# Patient Record
Sex: MALE | Race: WHITE | Employment: UNEMPLOYED | URBAN - METROPOLITAN AREA
[De-identification: names, ages, dates, MRNs, and addresses within clinical notes are randomized per-mention and may not be internally consistent; named-entity substitution may affect disease eponyms.]

---

## 2017-01-13 ENCOUNTER — HOSPITAL ENCOUNTER (OUTPATIENT)
Age: 57
Setting detail: OBSERVATION
Discharge: HOME | DRG: 690 | End: 2017-01-16
Attending: EMERGENCY MEDICINE | Admitting: INTERNAL MEDICINE
Payer: MEDICARE

## 2017-01-13 DIAGNOSIS — N39.0 URINARY TRACT INFECTION, SITE UNSPECIFIED: ICD-10-CM

## 2017-01-13 DIAGNOSIS — R31.9 HEMATURIA, UNDIAGNOSED CAUSE: ICD-10-CM

## 2017-01-13 DIAGNOSIS — A41.51 SEPSIS DUE TO ESCHERICHIA COLI (HCC): Primary | ICD-10-CM

## 2017-01-13 PROBLEM — I10 HTN (HYPERTENSION): Status: ACTIVE | Noted: 2017-01-13

## 2017-01-13 PROBLEM — N13.9 URINARY OBSTRUCTION: Status: ACTIVE | Noted: 2017-01-13

## 2017-01-13 LAB
ANION GAP SERPL CALCULATED.3IONS-SCNC: 16 MEQ/L (ref 7–13)
BACTERIA: ABNORMAL /HPF
BILIRUBIN URINE: ABNORMAL
BLOOD, URINE: ABNORMAL
BUN BLDV-MCNC: 17 MG/DL (ref 6–20)
CALCIUM SERPL-MCNC: 9.6 MG/DL (ref 8.6–10.2)
CHLORIDE BLD-SCNC: 99 MEQ/L (ref 98–107)
CLARITY: ABNORMAL
CO2: 23 MEQ/L (ref 22–29)
COLOR: ABNORMAL
CREAT SERPL-MCNC: 0.9 MG/DL (ref 0.7–1.2)
CRYSTALS, UA: ABNORMAL
EPITHELIAL CELLS, UA: ABNORMAL /HPF
GFR AFRICAN AMERICAN: >60
GFR NON-AFRICAN AMERICAN: >60
GLUCOSE BLD-MCNC: 148 MG/DL (ref 74–109)
GLUCOSE URINE: NEGATIVE MG/DL
HCT VFR BLD CALC: 53 % (ref 42–52)
HEMOGLOBIN: 16.9 G/DL (ref 14–18)
KETONES, URINE: 40 MG/DL
LACTIC ACID: 2.7 MMOL/L (ref 0.5–2.2)
LEUKOCYTE ESTERASE, URINE: ABNORMAL
MCH RBC QN AUTO: 30.2 PG (ref 27–31.3)
MCHC RBC AUTO-ENTMCNC: 31.9 % (ref 33–37)
MCV RBC AUTO: 94.7 FL (ref 80–100)
NITRITE, URINE: POSITIVE
PDW BLD-RTO: 14.3 % (ref 11.5–14.5)
PH UA: 7 (ref 5–9)
PLATELET # BLD: 276 K/UL (ref 130–400)
POTASSIUM SERPL-SCNC: 4 MEQ/L (ref 3.5–5.1)
PROTEIN UA: >=300 MG/DL
RBC # BLD: 5.6 M/UL (ref 4.7–6.1)
RBC UA: >100 /HPF (ref 0–2)
SODIUM BLD-SCNC: 138 MEQ/L (ref 132–144)
SPECIFIC GRAVITY UA: 1.03 (ref 1–1.03)
URINE REFLEX TO CULTURE: YES
UROBILINOGEN, URINE: 1 E.U./DL
WBC # BLD: 15 K/UL (ref 4.8–10.8)
WBC UA: ABNORMAL /HPF (ref 0–5)

## 2017-01-13 PROCEDURE — 1210000000 HC MED SURG R&B

## 2017-01-13 PROCEDURE — 96365 THER/PROPH/DIAG IV INF INIT: CPT

## 2017-01-13 PROCEDURE — 81001 URINALYSIS AUTO W/SCOPE: CPT

## 2017-01-13 PROCEDURE — 6370000000 HC RX 637 (ALT 250 FOR IP): Performed by: INTERNAL MEDICINE

## 2017-01-13 PROCEDURE — 99284 EMERGENCY DEPT VISIT MOD MDM: CPT

## 2017-01-13 PROCEDURE — 80048 BASIC METABOLIC PNL TOTAL CA: CPT

## 2017-01-13 PROCEDURE — 2580000003 HC RX 258: Performed by: INTERNAL MEDICINE

## 2017-01-13 PROCEDURE — G0378 HOSPITAL OBSERVATION PER HR: HCPCS

## 2017-01-13 PROCEDURE — 87086 URINE CULTURE/COLONY COUNT: CPT

## 2017-01-13 PROCEDURE — 83605 ASSAY OF LACTIC ACID: CPT

## 2017-01-13 PROCEDURE — 87077 CULTURE AEROBIC IDENTIFY: CPT

## 2017-01-13 PROCEDURE — 87186 SC STD MICRODIL/AGAR DIL: CPT

## 2017-01-13 PROCEDURE — 6370000000 HC RX 637 (ALT 250 FOR IP): Performed by: EMERGENCY MEDICINE

## 2017-01-13 PROCEDURE — 6360000002 HC RX W HCPCS: Performed by: EMERGENCY MEDICINE

## 2017-01-13 PROCEDURE — 85027 COMPLETE CBC AUTOMATED: CPT

## 2017-01-13 PROCEDURE — 36415 COLL VENOUS BLD VENIPUNCTURE: CPT

## 2017-01-13 PROCEDURE — 2580000003 HC RX 258: Performed by: EMERGENCY MEDICINE

## 2017-01-13 RX ORDER — SODIUM CHLORIDE 9 MG/ML
INJECTION, SOLUTION INTRAVENOUS CONTINUOUS
Status: DISPENSED | OUTPATIENT
Start: 2017-01-13 | End: 2017-01-16

## 2017-01-13 RX ORDER — SODIUM CHLORIDE 0.9 % (FLUSH) 0.9 %
10 SYRINGE (ML) INJECTION PRN
Status: DISCONTINUED | OUTPATIENT
Start: 2017-01-13 | End: 2017-01-16 | Stop reason: HOSPADM

## 2017-01-13 RX ORDER — NICOTINE 21 MG/24HR
1 PATCH, TRANSDERMAL 24 HOURS TRANSDERMAL DAILY
Status: DISCONTINUED | OUTPATIENT
Start: 2017-01-13 | End: 2017-01-16 | Stop reason: HOSPADM

## 2017-01-13 RX ORDER — SODIUM CHLORIDE 0.9 % (FLUSH) 0.9 %
10 SYRINGE (ML) INJECTION EVERY 12 HOURS SCHEDULED
Status: DISCONTINUED | OUTPATIENT
Start: 2017-01-13 | End: 2017-01-16 | Stop reason: HOSPADM

## 2017-01-13 RX ORDER — NICOTINE 21 MG/24HR
1 PATCH, TRANSDERMAL 24 HOURS TRANSDERMAL DAILY
Status: DISCONTINUED | OUTPATIENT
Start: 2017-01-13 | End: 2017-01-13

## 2017-01-13 RX ORDER — PHENAZOPYRIDINE HYDROCHLORIDE 200 MG/1
200 TABLET, FILM COATED ORAL ONCE
Status: COMPLETED | OUTPATIENT
Start: 2017-01-13 | End: 2017-01-13

## 2017-01-13 RX ORDER — BISACODYL 10 MG
10 SUPPOSITORY, RECTAL RECTAL DAILY PRN
Status: DISCONTINUED | OUTPATIENT
Start: 2017-01-13 | End: 2017-01-16 | Stop reason: HOSPADM

## 2017-01-13 RX ORDER — DOCUSATE SODIUM 100 MG/1
100 CAPSULE, LIQUID FILLED ORAL 2 TIMES DAILY
Status: DISCONTINUED | OUTPATIENT
Start: 2017-01-13 | End: 2017-01-16 | Stop reason: HOSPADM

## 2017-01-13 RX ORDER — ACETAMINOPHEN 325 MG/1
650 TABLET ORAL EVERY 4 HOURS PRN
Status: DISCONTINUED | OUTPATIENT
Start: 2017-01-13 | End: 2017-01-16 | Stop reason: HOSPADM

## 2017-01-13 RX ORDER — TAMSULOSIN HYDROCHLORIDE 0.4 MG/1
0.4 CAPSULE ORAL ONCE
Status: COMPLETED | OUTPATIENT
Start: 2017-01-13 | End: 2017-01-13

## 2017-01-13 RX ORDER — TAMSULOSIN HYDROCHLORIDE 0.4 MG/1
0.4 CAPSULE ORAL DAILY
Status: DISCONTINUED | OUTPATIENT
Start: 2017-01-13 | End: 2017-01-16 | Stop reason: HOSPADM

## 2017-01-13 RX ORDER — 0.9 % SODIUM CHLORIDE 0.9 %
1000 INTRAVENOUS SOLUTION INTRAVENOUS ONCE
Status: COMPLETED | OUTPATIENT
Start: 2017-01-13 | End: 2017-01-13

## 2017-01-13 RX ORDER — ONDANSETRON 2 MG/ML
4 INJECTION INTRAMUSCULAR; INTRAVENOUS EVERY 6 HOURS PRN
Status: DISCONTINUED | OUTPATIENT
Start: 2017-01-13 | End: 2017-01-16 | Stop reason: HOSPADM

## 2017-01-13 RX ADMIN — METOPROLOL TARTRATE 25 MG: 25 TABLET, FILM COATED ORAL at 23:08

## 2017-01-13 RX ADMIN — PHENAZOPYRIDINE 200 MG: 200 TABLET ORAL at 16:59

## 2017-01-13 RX ADMIN — DOCUSATE SODIUM 100 MG: 100 CAPSULE, LIQUID FILLED ORAL at 23:07

## 2017-01-13 RX ADMIN — TAMSULOSIN HYDROCHLORIDE 0.4 MG: 0.4 CAPSULE ORAL at 16:59

## 2017-01-13 RX ADMIN — SODIUM CHLORIDE, PRESERVATIVE FREE 10 ML: 5 INJECTION INTRAVENOUS at 23:22

## 2017-01-13 RX ADMIN — SODIUM CHLORIDE: 9 INJECTION, SOLUTION INTRAVENOUS at 23:22

## 2017-01-13 RX ADMIN — CEFTRIAXONE SODIUM 1 G: 1 INJECTION, POWDER, FOR SOLUTION INTRAMUSCULAR; INTRAVENOUS at 17:56

## 2017-01-13 RX ADMIN — SODIUM CHLORIDE 1000 ML: 9 INJECTION, SOLUTION INTRAVENOUS at 17:00

## 2017-01-13 ASSESSMENT — ENCOUNTER SYMPTOMS
CONSTIPATION: 1
COUGH: 0
DIARRHEA: 0
BACK PAIN: 1
VOMITING: 0
SHORTNESS OF BREATH: 0

## 2017-01-14 ENCOUNTER — APPOINTMENT (OUTPATIENT)
Dept: CT IMAGING | Age: 57
DRG: 690 | End: 2017-01-14
Payer: MEDICARE

## 2017-01-14 LAB
ANION GAP SERPL CALCULATED.3IONS-SCNC: 10 MEQ/L (ref 7–13)
BUN BLDV-MCNC: 15 MG/DL (ref 6–20)
CALCIUM SERPL-MCNC: 8.5 MG/DL (ref 8.6–10.2)
CHLORIDE BLD-SCNC: 104 MEQ/L (ref 98–107)
CO2: 22 MEQ/L (ref 22–29)
CREAT SERPL-MCNC: 0.68 MG/DL (ref 0.7–1.2)
GFR AFRICAN AMERICAN: >60
GFR NON-AFRICAN AMERICAN: >60
GLUCOSE BLD-MCNC: 97 MG/DL (ref 74–109)
HCT VFR BLD CALC: 43.3 % (ref 42–52)
HEMOGLOBIN: 13.9 G/DL (ref 14–18)
MCH RBC QN AUTO: 30.4 PG (ref 27–31.3)
MCHC RBC AUTO-ENTMCNC: 32.1 % (ref 33–37)
MCV RBC AUTO: 94.6 FL (ref 80–100)
PDW BLD-RTO: 14.1 % (ref 11.5–14.5)
PLATELET # BLD: 200 K/UL (ref 130–400)
POTASSIUM SERPL-SCNC: 4 MEQ/L (ref 3.5–5.1)
RBC # BLD: 4.58 M/UL (ref 4.7–6.1)
SODIUM BLD-SCNC: 136 MEQ/L (ref 132–144)
WBC # BLD: 9.9 K/UL (ref 4.8–10.8)

## 2017-01-14 PROCEDURE — 6370000000 HC RX 637 (ALT 250 FOR IP): Performed by: UROLOGY

## 2017-01-14 PROCEDURE — G8978 MOBILITY CURRENT STATUS: HCPCS

## 2017-01-14 PROCEDURE — 96375 TX/PRO/DX INJ NEW DRUG ADDON: CPT

## 2017-01-14 PROCEDURE — 99221 1ST HOSP IP/OBS SF/LOW 40: CPT | Performed by: UROLOGY

## 2017-01-14 PROCEDURE — 6360000004 HC RX CONTRAST MEDICATION: Performed by: RADIOLOGY

## 2017-01-14 PROCEDURE — G8979 MOBILITY GOAL STATUS: HCPCS

## 2017-01-14 PROCEDURE — G0378 HOSPITAL OBSERVATION PER HR: HCPCS

## 2017-01-14 PROCEDURE — 85027 COMPLETE CBC AUTOMATED: CPT

## 2017-01-14 PROCEDURE — 80048 BASIC METABOLIC PNL TOTAL CA: CPT

## 2017-01-14 PROCEDURE — 96376 TX/PRO/DX INJ SAME DRUG ADON: CPT

## 2017-01-14 PROCEDURE — 6370000000 HC RX 637 (ALT 250 FOR IP): Performed by: INTERNAL MEDICINE

## 2017-01-14 PROCEDURE — 36415 COLL VENOUS BLD VENIPUNCTURE: CPT

## 2017-01-14 PROCEDURE — 97161 PT EVAL LOW COMPLEX 20 MIN: CPT

## 2017-01-14 PROCEDURE — 1210000000 HC MED SURG R&B

## 2017-01-14 PROCEDURE — 2580000003 HC RX 258

## 2017-01-14 PROCEDURE — 2580000003 HC RX 258: Performed by: INTERNAL MEDICINE

## 2017-01-14 PROCEDURE — 74178 CT ABD&PLV WO CNTR FLWD CNTR: CPT

## 2017-01-14 PROCEDURE — 6360000002 HC RX W HCPCS: Performed by: INTERNAL MEDICINE

## 2017-01-14 PROCEDURE — 96366 THER/PROPH/DIAG IV INF ADDON: CPT

## 2017-01-14 RX ORDER — MORPHINE SULFATE 2 MG/ML
2 INJECTION, SOLUTION INTRAMUSCULAR; INTRAVENOUS ONCE
Status: COMPLETED | OUTPATIENT
Start: 2017-01-14 | End: 2017-01-14

## 2017-01-14 RX ORDER — PHENAZOPYRIDINE HYDROCHLORIDE 200 MG/1
200 TABLET, FILM COATED ORAL
Status: DISCONTINUED | OUTPATIENT
Start: 2017-01-14 | End: 2017-01-16 | Stop reason: HOSPADM

## 2017-01-14 RX ORDER — MAGNESIUM HYDROXIDE 1200 MG/15ML
60 LIQUID ORAL EVERY 10 MIN PRN
Status: DISCONTINUED | OUTPATIENT
Start: 2017-01-14 | End: 2017-01-15 | Stop reason: CLARIF

## 2017-01-14 RX ORDER — MAGNESIUM HYDROXIDE 1200 MG/15ML
LIQUID ORAL
Status: COMPLETED
Start: 2017-01-14 | End: 2017-01-14

## 2017-01-14 RX ORDER — SODIUM CHLORIDE 0.9 % (FLUSH) 0.9 %
10 SYRINGE (ML) INJECTION PRN
Status: DISCONTINUED | OUTPATIENT
Start: 2017-01-14 | End: 2017-01-16 | Stop reason: HOSPADM

## 2017-01-14 RX ORDER — MORPHINE SULFATE 2 MG/ML
2 INJECTION, SOLUTION INTRAMUSCULAR; INTRAVENOUS EVERY 4 HOURS PRN
Status: DISCONTINUED | OUTPATIENT
Start: 2017-01-14 | End: 2017-01-16 | Stop reason: HOSPADM

## 2017-01-14 RX ORDER — HYDROCODONE BITARTRATE AND ACETAMINOPHEN 5; 325 MG/1; MG/1
2 TABLET ORAL EVERY 6 HOURS PRN
Status: DISCONTINUED | OUTPATIENT
Start: 2017-01-14 | End: 2017-01-16 | Stop reason: HOSPADM

## 2017-01-14 RX ORDER — HYDROCODONE BITARTRATE AND ACETAMINOPHEN 5; 325 MG/1; MG/1
1 TABLET ORAL EVERY 6 HOURS PRN
Status: DISCONTINUED | OUTPATIENT
Start: 2017-01-14 | End: 2017-01-14 | Stop reason: SDUPTHER

## 2017-01-14 RX ADMIN — PHENAZOPYRIDINE 200 MG: 200 TABLET ORAL at 17:26

## 2017-01-14 RX ADMIN — METOPROLOL TARTRATE 25 MG: 25 TABLET, FILM COATED ORAL at 20:17

## 2017-01-14 RX ADMIN — SODIUM CHLORIDE 50 ML: 900 IRRIGANT IRRIGATION at 18:38

## 2017-01-14 RX ADMIN — DOCUSATE SODIUM 100 MG: 100 CAPSULE, LIQUID FILLED ORAL at 20:17

## 2017-01-14 RX ADMIN — SODIUM CHLORIDE, PRESERVATIVE FREE 10 ML: 5 INJECTION INTRAVENOUS at 13:35

## 2017-01-14 RX ADMIN — SODIUM CHLORIDE: 9 INJECTION, SOLUTION INTRAVENOUS at 09:01

## 2017-01-14 RX ADMIN — CEFTRIAXONE SODIUM 1 G: 1 INJECTION, POWDER, FOR SOLUTION INTRAMUSCULAR; INTRAVENOUS at 17:26

## 2017-01-14 RX ADMIN — TAMSULOSIN HYDROCHLORIDE 0.4 MG: 0.4 CAPSULE ORAL at 17:26

## 2017-01-14 RX ADMIN — MORPHINE SULFATE 2 MG: 2 INJECTION, SOLUTION INTRAMUSCULAR; INTRAVENOUS at 15:27

## 2017-01-14 RX ADMIN — PHENAZOPYRIDINE 200 MG: 200 TABLET ORAL at 12:37

## 2017-01-14 RX ADMIN — HYDROCODONE BITARTRATE AND ACETAMINOPHEN 1 TABLET: 5; 325 TABLET ORAL at 12:37

## 2017-01-14 RX ADMIN — MAGNESIUM HYDROXIDE 50 ML: 1200 LIQUID ORAL at 18:38

## 2017-01-14 RX ADMIN — DOCUSATE SODIUM 100 MG: 100 CAPSULE, LIQUID FILLED ORAL at 08:54

## 2017-01-14 RX ADMIN — MORPHINE SULFATE 2 MG: 2 INJECTION, SOLUTION INTRAMUSCULAR; INTRAVENOUS at 20:18

## 2017-01-14 RX ADMIN — IOPAMIDOL 100 ML: 612 INJECTION, SOLUTION INTRAVENOUS at 13:34

## 2017-01-14 RX ADMIN — METOPROLOL TARTRATE 25 MG: 25 TABLET, FILM COATED ORAL at 08:53

## 2017-01-14 ASSESSMENT — PAIN SCALES - GENERAL
PAINLEVEL_OUTOF10: 7
PAINLEVEL_OUTOF10: 5
PAINLEVEL_OUTOF10: 0
PAINLEVEL_OUTOF10: 8
PAINLEVEL_OUTOF10: 7
PAINLEVEL_OUTOF10: 6

## 2017-01-15 PROCEDURE — 6370000000 HC RX 637 (ALT 250 FOR IP): Performed by: INTERNAL MEDICINE

## 2017-01-15 PROCEDURE — 96376 TX/PRO/DX INJ SAME DRUG ADON: CPT

## 2017-01-15 PROCEDURE — 1210000000 HC MED SURG R&B

## 2017-01-15 PROCEDURE — 6370000000 HC RX 637 (ALT 250 FOR IP): Performed by: UROLOGY

## 2017-01-15 PROCEDURE — 99232 SBSQ HOSP IP/OBS MODERATE 35: CPT | Performed by: UROLOGY

## 2017-01-15 PROCEDURE — G8988 SELF CARE GOAL STATUS: HCPCS

## 2017-01-15 PROCEDURE — 6360000002 HC RX W HCPCS: Performed by: INTERNAL MEDICINE

## 2017-01-15 PROCEDURE — G8989 SELF CARE D/C STATUS: HCPCS

## 2017-01-15 PROCEDURE — 2580000003 HC RX 258

## 2017-01-15 PROCEDURE — 96366 THER/PROPH/DIAG IV INF ADDON: CPT

## 2017-01-15 PROCEDURE — 97165 OT EVAL LOW COMPLEX 30 MIN: CPT

## 2017-01-15 PROCEDURE — G0378 HOSPITAL OBSERVATION PER HR: HCPCS

## 2017-01-15 PROCEDURE — G8987 SELF CARE CURRENT STATUS: HCPCS

## 2017-01-15 PROCEDURE — 96375 TX/PRO/DX INJ NEW DRUG ADDON: CPT

## 2017-01-15 PROCEDURE — 2580000003 HC RX 258: Performed by: INTERNAL MEDICINE

## 2017-01-15 RX ORDER — MAGNESIUM HYDROXIDE 1200 MG/15ML
50 LIQUID ORAL EVERY 10 MIN PRN
Status: DISCONTINUED | OUTPATIENT
Start: 2017-01-15 | End: 2017-01-15 | Stop reason: CLARIF

## 2017-01-15 RX ORDER — LORAZEPAM 2 MG/ML
1 INJECTION INTRAMUSCULAR EVERY 4 HOURS PRN
Status: DISCONTINUED | OUTPATIENT
Start: 2017-01-15 | End: 2017-01-16 | Stop reason: HOSPADM

## 2017-01-15 RX ORDER — MAGNESIUM HYDROXIDE 1200 MG/15ML
50 LIQUID ORAL EVERY 10 MIN PRN
Status: DISCONTINUED | OUTPATIENT
Start: 2017-01-15 | End: 2017-01-16 | Stop reason: HOSPADM

## 2017-01-15 RX ORDER — ATROPA BELLADONNA AND OPIUM 16.2; 6 MG/1; MG/1
60 SUPPOSITORY RECTAL EVERY 8 HOURS PRN
Status: DISCONTINUED | OUTPATIENT
Start: 2017-01-15 | End: 2017-01-16 | Stop reason: HOSPADM

## 2017-01-15 RX ORDER — MAGNESIUM HYDROXIDE 1200 MG/15ML
LIQUID ORAL
Status: COMPLETED
Start: 2017-01-15 | End: 2017-01-15

## 2017-01-15 RX ADMIN — ATROPA BELLADONNA AND OPIUM 60 MG: 16.2; 6 SUPPOSITORY RECTAL at 12:47

## 2017-01-15 RX ADMIN — PHENAZOPYRIDINE 200 MG: 200 TABLET ORAL at 12:47

## 2017-01-15 RX ADMIN — CEFTRIAXONE SODIUM 1 G: 1 INJECTION, POWDER, FOR SOLUTION INTRAMUSCULAR; INTRAVENOUS at 18:12

## 2017-01-15 RX ADMIN — DOCUSATE SODIUM 100 MG: 100 CAPSULE, LIQUID FILLED ORAL at 23:17

## 2017-01-15 RX ADMIN — TAMSULOSIN HYDROCHLORIDE 0.4 MG: 0.4 CAPSULE ORAL at 18:12

## 2017-01-15 RX ADMIN — SODIUM CHLORIDE 50 ML: 900 IRRIGANT IRRIGATION at 12:47

## 2017-01-15 RX ADMIN — METOPROLOL TARTRATE 25 MG: 25 TABLET, FILM COATED ORAL at 23:17

## 2017-01-15 RX ADMIN — PHENAZOPYRIDINE 200 MG: 200 TABLET ORAL at 08:52

## 2017-01-15 RX ADMIN — METOPROLOL TARTRATE 25 MG: 25 TABLET, FILM COATED ORAL at 08:53

## 2017-01-15 RX ADMIN — MORPHINE SULFATE 2 MG: 2 INJECTION, SOLUTION INTRAMUSCULAR; INTRAVENOUS at 09:06

## 2017-01-15 RX ADMIN — PHENAZOPYRIDINE 200 MG: 200 TABLET ORAL at 18:12

## 2017-01-15 RX ADMIN — MORPHINE SULFATE 2 MG: 2 INJECTION, SOLUTION INTRAMUSCULAR; INTRAVENOUS at 02:47

## 2017-01-15 RX ADMIN — MAGNESIUM HYDROXIDE 50 ML: 1200 LIQUID ORAL at 12:47

## 2017-01-15 RX ADMIN — SODIUM CHLORIDE: 9 INJECTION, SOLUTION INTRAVENOUS at 07:08

## 2017-01-15 RX ADMIN — LORAZEPAM 1 MG: 2 INJECTION INTRAMUSCULAR; INTRAVENOUS at 15:03

## 2017-01-15 RX ADMIN — SODIUM CHLORIDE, PRESERVATIVE FREE 10 ML: 5 INJECTION INTRAVENOUS at 23:24

## 2017-01-15 RX ADMIN — DOCUSATE SODIUM 100 MG: 100 CAPSULE, LIQUID FILLED ORAL at 12:47

## 2017-01-15 RX ADMIN — LORAZEPAM 1 MG: 2 INJECTION INTRAMUSCULAR; INTRAVENOUS at 23:17

## 2017-01-15 RX ADMIN — MORPHINE SULFATE 2 MG: 2 INJECTION, SOLUTION INTRAMUSCULAR; INTRAVENOUS at 00:48

## 2017-01-15 ASSESSMENT — PAIN SCALES - GENERAL
PAINLEVEL_OUTOF10: 7
PAINLEVEL_OUTOF10: 9
PAINLEVEL_OUTOF10: 6

## 2017-01-15 ASSESSMENT — ENCOUNTER SYMPTOMS: SHORTNESS OF BREATH: 0

## 2017-01-16 VITALS
SYSTOLIC BLOOD PRESSURE: 160 MMHG | HEART RATE: 104 BPM | WEIGHT: 185 LBS | OXYGEN SATURATION: 97 % | HEIGHT: 70 IN | RESPIRATION RATE: 18 BRPM | BODY MASS INDEX: 26.48 KG/M2 | TEMPERATURE: 98.3 F | DIASTOLIC BLOOD PRESSURE: 91 MMHG

## 2017-01-16 LAB
ANION GAP SERPL CALCULATED.3IONS-SCNC: 11 MEQ/L (ref 7–13)
BASOPHILS ABSOLUTE: 0.1 K/UL (ref 0–0.2)
BASOPHILS RELATIVE PERCENT: 0.6 %
BUN BLDV-MCNC: 10 MG/DL (ref 6–20)
CALCIUM SERPL-MCNC: 8.5 MG/DL (ref 8.6–10.2)
CHLORIDE BLD-SCNC: 104 MEQ/L (ref 98–107)
CO2: 24 MEQ/L (ref 22–29)
CREAT SERPL-MCNC: 0.74 MG/DL (ref 0.7–1.2)
EOSINOPHILS ABSOLUTE: 0.2 K/UL (ref 0–0.7)
EOSINOPHILS RELATIVE PERCENT: 2.5 %
GFR AFRICAN AMERICAN: >60
GFR NON-AFRICAN AMERICAN: >60
GLUCOSE BLD-MCNC: 88 MG/DL (ref 74–109)
HCT VFR BLD CALC: 40.7 % (ref 42–52)
HEMOGLOBIN: 13.6 G/DL (ref 14–18)
LYMPHOCYTES ABSOLUTE: 2.5 K/UL (ref 1–4.8)
LYMPHOCYTES RELATIVE PERCENT: 28.3 %
MCH RBC QN AUTO: 31.4 PG (ref 27–31.3)
MCHC RBC AUTO-ENTMCNC: 33.4 % (ref 33–37)
MCV RBC AUTO: 93.8 FL (ref 80–100)
MONOCYTES ABSOLUTE: 0.5 K/UL (ref 0.2–0.8)
MONOCYTES RELATIVE PERCENT: 5.9 %
NEUTROPHILS ABSOLUTE: 5.5 K/UL (ref 1.4–6.5)
NEUTROPHILS RELATIVE PERCENT: 62.7 %
PDW BLD-RTO: 13.9 % (ref 11.5–14.5)
PLATELET # BLD: 186 K/UL (ref 130–400)
POTASSIUM SERPL-SCNC: 4.1 MEQ/L (ref 3.5–5.1)
RBC # BLD: 4.34 M/UL (ref 4.7–6.1)
SODIUM BLD-SCNC: 139 MEQ/L (ref 132–144)
WBC # BLD: 8.8 K/UL (ref 4.8–10.8)

## 2017-01-16 PROCEDURE — G0378 HOSPITAL OBSERVATION PER HR: HCPCS

## 2017-01-16 PROCEDURE — 6370000000 HC RX 637 (ALT 250 FOR IP): Performed by: UROLOGY

## 2017-01-16 PROCEDURE — 36415 COLL VENOUS BLD VENIPUNCTURE: CPT

## 2017-01-16 PROCEDURE — 80048 BASIC METABOLIC PNL TOTAL CA: CPT

## 2017-01-16 PROCEDURE — 6370000000 HC RX 637 (ALT 250 FOR IP): Performed by: INTERNAL MEDICINE

## 2017-01-16 PROCEDURE — 99231 SBSQ HOSP IP/OBS SF/LOW 25: CPT | Performed by: UROLOGY

## 2017-01-16 PROCEDURE — 85025 COMPLETE CBC W/AUTO DIFF WBC: CPT

## 2017-01-16 PROCEDURE — 96376 TX/PRO/DX INJ SAME DRUG ADON: CPT

## 2017-01-16 PROCEDURE — 2580000003 HC RX 258: Performed by: INTERNAL MEDICINE

## 2017-01-16 PROCEDURE — 6360000002 HC RX W HCPCS: Performed by: INTERNAL MEDICINE

## 2017-01-16 RX ORDER — CEFUROXIME AXETIL 500 MG/1
250 TABLET ORAL 2 TIMES DAILY
Qty: 10 TABLET | Refills: 0 | Status: SHIPPED | OUTPATIENT
Start: 2017-01-16 | End: 2017-01-26

## 2017-01-16 RX ORDER — CEFUROXIME AXETIL 500 MG/1
500 TABLET ORAL 2 TIMES DAILY
Qty: 10 TABLET | Refills: 0 | Status: SHIPPED | OUTPATIENT
Start: 2017-01-16 | End: 2017-01-16

## 2017-01-16 RX ADMIN — METOPROLOL TARTRATE 25 MG: 25 TABLET, FILM COATED ORAL at 08:16

## 2017-01-16 RX ADMIN — PHENAZOPYRIDINE 200 MG: 200 TABLET ORAL at 08:16

## 2017-01-16 RX ADMIN — DOCUSATE SODIUM 100 MG: 100 CAPSULE, LIQUID FILLED ORAL at 08:16

## 2017-01-16 RX ADMIN — SODIUM CHLORIDE, PRESERVATIVE FREE 10 ML: 5 INJECTION INTRAVENOUS at 08:18

## 2017-01-16 RX ADMIN — MORPHINE SULFATE 2 MG: 2 INJECTION, SOLUTION INTRAMUSCULAR; INTRAVENOUS at 06:01

## 2017-01-16 ASSESSMENT — PAIN SCALES - GENERAL: PAINLEVEL_OUTOF10: 5

## 2017-01-21 LAB
Lab: NORMAL
REPORT: NORMAL
THIS TEST SENT TO: NORMAL

## 2017-01-22 LAB
ORGANISM: ABNORMAL
URINE CULTURE, ROUTINE: ABNORMAL

## 2017-01-24 ENCOUNTER — OFFICE VISIT (OUTPATIENT)
Dept: UROLOGY | Age: 57
End: 2017-01-24

## 2017-01-24 VITALS
HEART RATE: 82 BPM | DIASTOLIC BLOOD PRESSURE: 84 MMHG | HEIGHT: 70 IN | SYSTOLIC BLOOD PRESSURE: 126 MMHG | BODY MASS INDEX: 27.63 KG/M2 | WEIGHT: 193 LBS

## 2017-01-24 DIAGNOSIS — R33.9 INCOMPLETE EMPTYING OF BLADDER: Primary | ICD-10-CM

## 2017-01-24 DIAGNOSIS — R31.9 URINARY TRACT INFECTION WITH HEMATURIA, SITE UNSPECIFIED: ICD-10-CM

## 2017-01-24 DIAGNOSIS — R31.0 HEMATURIA, GROSS: ICD-10-CM

## 2017-01-24 DIAGNOSIS — N39.0 URINARY TRACT INFECTION WITH HEMATURIA, SITE UNSPECIFIED: ICD-10-CM

## 2017-01-24 LAB
BILIRUBIN, POC: NORMAL
BLOOD URINE, POC: NORMAL
CLARITY, POC: CLEAR
COLOR, POC: YELLOW
GLUCOSE URINE, POC: NORMAL
KETONES, POC: NORMAL
LEUKOCYTE EST, POC: NORMAL
NITRITE, POC: NORMAL
PH, POC: 6
PROTEIN, POC: NORMAL
SPECIFIC GRAVITY, POC: 1.02
UROBILINOGEN, POC: 0.2

## 2017-01-24 PROCEDURE — 81003 URINALYSIS AUTO W/O SCOPE: CPT | Performed by: UROLOGY

## 2017-01-24 PROCEDURE — 51741 ELECTRO-UROFLOWMETRY FIRST: CPT | Performed by: UROLOGY

## 2017-01-24 PROCEDURE — 99214 OFFICE O/P EST MOD 30 MIN: CPT | Performed by: UROLOGY

## 2017-01-24 PROCEDURE — 51798 US URINE CAPACITY MEASURE: CPT | Performed by: UROLOGY

## 2017-01-24 RX ORDER — TAMSULOSIN HYDROCHLORIDE 0.4 MG/1
CAPSULE ORAL
COMMUNITY
Start: 2017-01-16 | End: 2017-05-17

## 2017-01-24 ASSESSMENT — ENCOUNTER SYMPTOMS
ABDOMINAL DISTENTION: 0
SHORTNESS OF BREATH: 0
ABDOMINAL PAIN: 0

## 2017-01-27 ENCOUNTER — OFFICE VISIT (OUTPATIENT)
Dept: FAMILY MEDICINE CLINIC | Age: 57
End: 2017-01-27

## 2017-01-27 VITALS
BODY MASS INDEX: 28.2 KG/M2 | SYSTOLIC BLOOD PRESSURE: 120 MMHG | WEIGHT: 197 LBS | HEIGHT: 70 IN | HEART RATE: 80 BPM | OXYGEN SATURATION: 96 % | DIASTOLIC BLOOD PRESSURE: 82 MMHG

## 2017-01-27 DIAGNOSIS — Z11.59 NEED FOR HEPATITIS C SCREENING TEST: ICD-10-CM

## 2017-01-27 DIAGNOSIS — N13.9 URINARY OBSTRUCTION: ICD-10-CM

## 2017-01-27 DIAGNOSIS — Z23 NEEDS FLU SHOT: ICD-10-CM

## 2017-01-27 DIAGNOSIS — Z23 NEED FOR PNEUMOCOCCAL VACCINATION: ICD-10-CM

## 2017-01-27 DIAGNOSIS — Z11.4 SCREENING FOR HIV (HUMAN IMMUNODEFICIENCY VIRUS): ICD-10-CM

## 2017-01-27 DIAGNOSIS — E78.5 HYPERLIPIDEMIA, UNSPECIFIED HYPERLIPIDEMIA TYPE: ICD-10-CM

## 2017-01-27 DIAGNOSIS — I10 ESSENTIAL HYPERTENSION: Primary | ICD-10-CM

## 2017-01-27 DIAGNOSIS — Z72.0 TOBACCO USE: ICD-10-CM

## 2017-01-27 PROCEDURE — 99203 OFFICE O/P NEW LOW 30 MIN: CPT | Performed by: FAMILY MEDICINE

## 2017-01-27 PROCEDURE — 90688 IIV4 VACCINE SPLT 0.5 ML IM: CPT | Performed by: FAMILY MEDICINE

## 2017-01-27 PROCEDURE — 90732 PPSV23 VACC 2 YRS+ SUBQ/IM: CPT | Performed by: FAMILY MEDICINE

## 2017-01-27 PROCEDURE — G0009 ADMIN PNEUMOCOCCAL VACCINE: HCPCS | Performed by: FAMILY MEDICINE

## 2017-01-27 PROCEDURE — G0008 ADMIN INFLUENZA VIRUS VAC: HCPCS | Performed by: FAMILY MEDICINE

## 2017-01-27 RX ORDER — VARENICLINE TARTRATE 1 MG/1
1 TABLET, FILM COATED ORAL 2 TIMES DAILY
Qty: 60 TABLET | Refills: 5 | Status: SHIPPED | OUTPATIENT
Start: 2017-01-27 | End: 2017-05-17

## 2017-01-27 RX ORDER — VARENICLINE TARTRATE 25 MG
KIT ORAL
Qty: 41 EACH | Refills: 0 | Status: SHIPPED | OUTPATIENT
Start: 2017-01-27 | End: 2017-05-17 | Stop reason: ALTCHOICE

## 2017-01-27 ASSESSMENT — PATIENT HEALTH QUESTIONNAIRE - PHQ9
2. FEELING DOWN, DEPRESSED OR HOPELESS: 0
SUM OF ALL RESPONSES TO PHQ QUESTIONS 1-9: 0
1. LITTLE INTEREST OR PLEASURE IN DOING THINGS: 0
SUM OF ALL RESPONSES TO PHQ9 QUESTIONS 1 & 2: 0

## 2017-01-31 ENCOUNTER — PROCEDURE VISIT (OUTPATIENT)
Dept: UROLOGY | Age: 57
End: 2017-01-31

## 2017-01-31 VITALS
WEIGHT: 198 LBS | HEART RATE: 72 BPM | HEIGHT: 70 IN | DIASTOLIC BLOOD PRESSURE: 78 MMHG | BODY MASS INDEX: 28.35 KG/M2 | SYSTOLIC BLOOD PRESSURE: 118 MMHG

## 2017-01-31 DIAGNOSIS — N13.8 BPH WITH OBSTRUCTION/LOWER URINARY TRACT SYMPTOMS: ICD-10-CM

## 2017-01-31 DIAGNOSIS — N39.0 URINARY TRACT INFECTION WITH HEMATURIA, SITE UNSPECIFIED: ICD-10-CM

## 2017-01-31 DIAGNOSIS — R33.9 INCOMPLETE EMPTYING OF BLADDER: ICD-10-CM

## 2017-01-31 DIAGNOSIS — N40.1 BPH WITH OBSTRUCTION/LOWER URINARY TRACT SYMPTOMS: ICD-10-CM

## 2017-01-31 DIAGNOSIS — R31.9 URINARY TRACT INFECTION WITH HEMATURIA, SITE UNSPECIFIED: ICD-10-CM

## 2017-01-31 DIAGNOSIS — R31.9 HEMATURIA: Primary | ICD-10-CM

## 2017-01-31 LAB
BILIRUBIN, POC: NORMAL
BLOOD URINE, POC: NORMAL
CLARITY, POC: CLEAR
COLOR, POC: YELLOW
GLUCOSE URINE, POC: NORMAL
KETONES, POC: NORMAL
LEUKOCYTE EST, POC: NORMAL
NITRITE, POC: NORMAL
PH, POC: 6
PROTEIN, POC: NORMAL
SPECIFIC GRAVITY, POC: >1.03
UROBILINOGEN, POC: 0.2

## 2017-01-31 PROCEDURE — 81003 URINALYSIS AUTO W/O SCOPE: CPT | Performed by: UROLOGY

## 2017-01-31 PROCEDURE — 52000 CYSTOURETHROSCOPY: CPT | Performed by: UROLOGY

## 2017-01-31 PROCEDURE — 99213 OFFICE O/P EST LOW 20 MIN: CPT | Performed by: UROLOGY

## 2017-01-31 RX ORDER — FINASTERIDE 5 MG/1
5 TABLET, FILM COATED ORAL DAILY
Qty: 90 TABLET | Refills: 3 | Status: SHIPPED | OUTPATIENT
Start: 2017-01-31 | End: 2017-05-17

## 2017-01-31 RX ORDER — CIPROFLOXACIN 500 MG/1
500 TABLET, FILM COATED ORAL ONCE
Qty: 1 TABLET | Refills: 0 | COMMUNITY
Start: 2017-01-31 | End: 2017-01-31

## 2017-01-31 ASSESSMENT — ENCOUNTER SYMPTOMS
ABDOMINAL DISTENTION: 0
ABDOMINAL PAIN: 0

## 2017-02-06 ENCOUNTER — TELEPHONE (OUTPATIENT)
Dept: UROLOGY | Age: 57
End: 2017-02-06

## 2017-02-16 DIAGNOSIS — N39.0 URINARY TRACT INFECTION WITH HEMATURIA, SITE UNSPECIFIED: ICD-10-CM

## 2017-02-16 DIAGNOSIS — Z11.4 SCREENING FOR HIV (HUMAN IMMUNODEFICIENCY VIRUS): ICD-10-CM

## 2017-02-16 DIAGNOSIS — R31.9 URINARY TRACT INFECTION WITH HEMATURIA, SITE UNSPECIFIED: ICD-10-CM

## 2017-02-16 DIAGNOSIS — Z11.59 NEED FOR HEPATITIS C SCREENING TEST: ICD-10-CM

## 2017-02-16 DIAGNOSIS — E78.5 HYPERLIPIDEMIA, UNSPECIFIED HYPERLIPIDEMIA TYPE: ICD-10-CM

## 2017-02-16 LAB
CHOLESTEROL, TOTAL: 259 MG/DL (ref 0–199)
HDLC SERPL-MCNC: 33 MG/DL (ref 40–59)
HEPATITIS C ANTIBODY INTERPRETATION: NORMAL
LDL CHOLESTEROL CALCULATED: 162 MG/DL (ref 0–129)
PROSTATE SPECIFIC ANTIGEN: 0.51 NG/ML (ref 0–3.89)
TRIGL SERPL-MCNC: 322 MG/DL (ref 0–200)

## 2017-02-18 LAB — HIV-1 AND HIV-2 ANTIBODIES: NEGATIVE

## 2017-04-27 ENCOUNTER — PROCEDURE VISIT (OUTPATIENT)
Dept: UROLOGY | Age: 57
End: 2017-04-27

## 2017-04-27 VITALS
BODY MASS INDEX: 27.3 KG/M2 | SYSTOLIC BLOOD PRESSURE: 130 MMHG | WEIGHT: 195 LBS | DIASTOLIC BLOOD PRESSURE: 76 MMHG | HEART RATE: 74 BPM | HEIGHT: 71 IN

## 2017-04-27 DIAGNOSIS — N13.8 BPH WITH OBSTRUCTION/LOWER URINARY TRACT SYMPTOMS: ICD-10-CM

## 2017-04-27 DIAGNOSIS — N30.00 ACUTE CYSTITIS WITHOUT HEMATURIA: ICD-10-CM

## 2017-04-27 DIAGNOSIS — N40.1 BPH WITH OBSTRUCTION/LOWER URINARY TRACT SYMPTOMS: ICD-10-CM

## 2017-04-27 DIAGNOSIS — R31.9 HEMATURIA: Primary | ICD-10-CM

## 2017-04-27 LAB
BILIRUBIN, POC: ABNORMAL
BLOOD URINE, POC: ABNORMAL
CLARITY, POC: ABNORMAL
COLOR, POC: YELLOW
GLUCOSE URINE, POC: ABNORMAL
KETONES, POC: ABNORMAL
LEUKOCYTE EST, POC: ABNORMAL
NITRITE, POC: POSITIVE
PH, POC: 7.5
PROTEIN, POC: >300
SPECIFIC GRAVITY, POC: 1.02
UROBILINOGEN, POC: 1

## 2017-04-27 PROCEDURE — 81003 URINALYSIS AUTO W/O SCOPE: CPT | Performed by: UROLOGY

## 2017-04-27 PROCEDURE — 51798 US URINE CAPACITY MEASURE: CPT | Performed by: UROLOGY

## 2017-04-27 PROCEDURE — 99214 OFFICE O/P EST MOD 30 MIN: CPT | Performed by: UROLOGY

## 2017-04-27 PROCEDURE — 51741 ELECTRO-UROFLOWMETRY FIRST: CPT | Performed by: UROLOGY

## 2017-04-27 RX ORDER — CIPROFLOXACIN 500 MG/1
500 TABLET, FILM COATED ORAL 2 TIMES DAILY
Qty: 20 TABLET | Refills: 0 | Status: SHIPPED | OUTPATIENT
Start: 2017-04-27 | End: 2017-05-17 | Stop reason: ALTCHOICE

## 2017-04-27 RX ORDER — PHENAZOPYRIDINE HYDROCHLORIDE 200 MG/1
200 TABLET, FILM COATED ORAL 3 TIMES DAILY PRN
Qty: 15 TABLET | Refills: 0 | Status: SHIPPED | OUTPATIENT
Start: 2017-04-27 | End: 2017-04-30

## 2017-04-27 ASSESSMENT — ENCOUNTER SYMPTOMS
SHORTNESS OF BREATH: 0
ABDOMINAL PAIN: 0

## 2017-04-30 LAB
ORGANISM: ABNORMAL
URINE CULTURE, ROUTINE: ABNORMAL

## 2017-05-01 RX ORDER — CEPHALEXIN 500 MG/1
500 CAPSULE ORAL 2 TIMES DAILY
Qty: 20 CAPSULE | Refills: 0 | Status: SHIPPED | OUTPATIENT
Start: 2017-05-01 | End: 2017-05-17 | Stop reason: ALTCHOICE

## 2017-05-17 ENCOUNTER — OFFICE VISIT (OUTPATIENT)
Dept: FAMILY MEDICINE CLINIC | Age: 57
End: 2017-05-17

## 2017-05-17 VITALS
DIASTOLIC BLOOD PRESSURE: 72 MMHG | WEIGHT: 197 LBS | OXYGEN SATURATION: 96 % | SYSTOLIC BLOOD PRESSURE: 116 MMHG | HEIGHT: 70 IN | HEART RATE: 68 BPM | BODY MASS INDEX: 28.2 KG/M2

## 2017-05-17 DIAGNOSIS — Z78.9 STATIN INTOLERANCE: Primary | ICD-10-CM

## 2017-05-17 DIAGNOSIS — I10 ESSENTIAL HYPERTENSION: ICD-10-CM

## 2017-05-17 PROCEDURE — 99213 OFFICE O/P EST LOW 20 MIN: CPT | Performed by: FAMILY MEDICINE

## 2017-05-17 RX ORDER — AMLODIPINE BESYLATE 5 MG/1
5 TABLET ORAL DAILY
Qty: 30 TABLET | Refills: 5 | Status: SHIPPED | OUTPATIENT
Start: 2017-05-17 | End: 2017-06-08

## 2017-05-17 RX ORDER — TAMSULOSIN HYDROCHLORIDE 0.4 MG/1
0.4 CAPSULE ORAL DAILY
COMMUNITY
Start: 2017-04-21 | End: 2018-01-22 | Stop reason: SDUPTHER

## 2017-05-18 ENCOUNTER — PROCEDURE VISIT (OUTPATIENT)
Dept: UROLOGY | Age: 57
End: 2017-05-18

## 2017-05-18 VITALS
HEIGHT: 70 IN | WEIGHT: 197 LBS | BODY MASS INDEX: 28.2 KG/M2 | SYSTOLIC BLOOD PRESSURE: 136 MMHG | DIASTOLIC BLOOD PRESSURE: 76 MMHG | HEART RATE: 90 BPM

## 2017-05-18 DIAGNOSIS — R31.9 HEMATURIA: Primary | ICD-10-CM

## 2017-05-18 DIAGNOSIS — Z87.440 HISTORY OF RECURRENT UTIS: ICD-10-CM

## 2017-05-18 LAB
BILIRUBIN, POC: ABNORMAL
BLOOD URINE, POC: ABNORMAL
CLARITY, POC: ABNORMAL
COLOR, POC: YELLOW
GLUCOSE URINE, POC: ABNORMAL
KETONES, POC: ABNORMAL
LEUKOCYTE EST, POC: ABNORMAL
NITRITE, POC: ABNORMAL
PH, POC: 5.5
PROTEIN, POC: ABNORMAL
SPECIFIC GRAVITY, POC: 1.01
UROBILINOGEN, POC: 0.2

## 2017-05-18 PROCEDURE — 52000 CYSTOURETHROSCOPY: CPT | Performed by: UROLOGY

## 2017-05-18 PROCEDURE — 99213 OFFICE O/P EST LOW 20 MIN: CPT | Performed by: UROLOGY

## 2017-05-18 PROCEDURE — 81003 URINALYSIS AUTO W/O SCOPE: CPT | Performed by: UROLOGY

## 2017-05-18 RX ORDER — CIPROFLOXACIN 500 MG/1
500 TABLET, FILM COATED ORAL ONCE
Qty: 1 TABLET | Refills: 0 | COMMUNITY
Start: 2017-05-18 | End: 2017-05-18

## 2017-05-18 ASSESSMENT — ENCOUNTER SYMPTOMS: SHORTNESS OF BREATH: 0

## 2017-06-08 ENCOUNTER — OFFICE VISIT (OUTPATIENT)
Dept: FAMILY MEDICINE CLINIC | Age: 57
End: 2017-06-08

## 2017-06-08 VITALS
BODY MASS INDEX: 27.3 KG/M2 | DIASTOLIC BLOOD PRESSURE: 82 MMHG | HEART RATE: 86 BPM | TEMPERATURE: 97.8 F | SYSTOLIC BLOOD PRESSURE: 130 MMHG | HEIGHT: 71 IN | OXYGEN SATURATION: 97 % | WEIGHT: 195 LBS

## 2017-06-08 DIAGNOSIS — H02.846 SWELLING OF EYELID, LEFT: ICD-10-CM

## 2017-06-08 DIAGNOSIS — I10 ESSENTIAL HYPERTENSION: Primary | ICD-10-CM

## 2017-06-08 PROCEDURE — 99213 OFFICE O/P EST LOW 20 MIN: CPT | Performed by: NURSE PRACTITIONER

## 2017-06-08 RX ORDER — AMLODIPINE BESYLATE 10 MG/1
10 TABLET ORAL DAILY
Qty: 30 TABLET | Refills: 5 | Status: SHIPPED | OUTPATIENT
Start: 2017-06-08 | End: 2017-08-11 | Stop reason: DRUGHIGH

## 2017-06-08 ASSESSMENT — ENCOUNTER SYMPTOMS
ABDOMINAL PAIN: 0
EYE PAIN: 0
SHORTNESS OF BREATH: 0
BACK PAIN: 0
WHEEZING: 0
CHEST TIGHTNESS: 0
COUGH: 0
EYE REDNESS: 0

## 2017-06-17 ENCOUNTER — HOSPITAL ENCOUNTER (EMERGENCY)
Age: 57
Discharge: HOME | End: 2017-06-17
Attending: EMERGENCY MEDICINE
Payer: MEDICARE

## 2017-06-17 VITALS
OXYGEN SATURATION: 96 % | HEART RATE: 112 BPM | SYSTOLIC BLOOD PRESSURE: 126 MMHG | BODY MASS INDEX: 27.92 KG/M2 | RESPIRATION RATE: 17 BRPM | DIASTOLIC BLOOD PRESSURE: 78 MMHG | WEIGHT: 195 LBS | TEMPERATURE: 98.5 F | HEIGHT: 70 IN

## 2017-06-17 DIAGNOSIS — N30.01 ACUTE CYSTITIS WITH HEMATURIA: Primary | ICD-10-CM

## 2017-06-17 LAB
BACTERIA: ABNORMAL /HPF
BILIRUBIN URINE: NEGATIVE
BLOOD, URINE: ABNORMAL
CLARITY: ABNORMAL
COLOR: ABNORMAL
GLUCOSE URINE: NEGATIVE MG/DL
KETONES, URINE: NEGATIVE MG/DL
LEUKOCYTE ESTERASE, URINE: ABNORMAL
NITRITE, URINE: NEGATIVE
PH UA: 6.5 (ref 5–9)
PROTEIN UA: ABNORMAL MG/DL
RBC UA: >100 /HPF (ref 0–2)
SPECIFIC GRAVITY UA: 1.02 (ref 1–1.03)
URINE REFLEX TO CULTURE: YES
UROBILINOGEN, URINE: 0.2 E.U./DL
WBC UA: ABNORMAL /HPF (ref 0–5)

## 2017-06-17 PROCEDURE — 87086 URINE CULTURE/COLONY COUNT: CPT

## 2017-06-17 PROCEDURE — 81001 URINALYSIS AUTO W/SCOPE: CPT

## 2017-06-17 PROCEDURE — 99283 EMERGENCY DEPT VISIT LOW MDM: CPT

## 2017-06-17 PROCEDURE — 6370000000 HC RX 637 (ALT 250 FOR IP): Performed by: EMERGENCY MEDICINE

## 2017-06-17 RX ORDER — CIPROFLOXACIN 500 MG/1
500 TABLET, FILM COATED ORAL 2 TIMES DAILY
Qty: 14 TABLET | Refills: 0 | Status: SHIPPED | OUTPATIENT
Start: 2017-06-17 | End: 2017-06-24

## 2017-06-17 RX ORDER — CIPROFLOXACIN 500 MG/1
500 TABLET, FILM COATED ORAL ONCE
Status: COMPLETED | OUTPATIENT
Start: 2017-06-17 | End: 2017-06-17

## 2017-06-17 RX ADMIN — CIPROFLOXACIN 500 MG: 500 TABLET, FILM COATED ORAL at 20:34

## 2017-06-17 ASSESSMENT — ENCOUNTER SYMPTOMS
PHOTOPHOBIA: 0
EYE DISCHARGE: 0
CHEST TIGHTNESS: 0
COUGH: 0
SHORTNESS OF BREATH: 0
ABDOMINAL PAIN: 0
WHEEZING: 0
SORE THROAT: 0
VOMITING: 0
ABDOMINAL DISTENTION: 0

## 2017-06-17 ASSESSMENT — PAIN DESCRIPTION - PAIN TYPE: TYPE: ACUTE PAIN

## 2017-06-17 ASSESSMENT — PAIN DESCRIPTION - LOCATION: LOCATION: PENIS

## 2017-06-17 ASSESSMENT — PAIN SCALES - GENERAL: PAINLEVEL_OUTOF10: 4

## 2017-06-19 LAB
ORGANISM: ABNORMAL
URINE CULTURE, ROUTINE: ABNORMAL

## 2017-07-06 ENCOUNTER — OFFICE VISIT (OUTPATIENT)
Dept: FAMILY MEDICINE CLINIC | Age: 57
End: 2017-07-06

## 2017-07-06 VITALS
WEIGHT: 193.7 LBS | TEMPERATURE: 97.1 F | SYSTOLIC BLOOD PRESSURE: 128 MMHG | OXYGEN SATURATION: 98 % | DIASTOLIC BLOOD PRESSURE: 80 MMHG | HEART RATE: 83 BPM | BODY MASS INDEX: 27.73 KG/M2 | HEIGHT: 70 IN

## 2017-07-06 DIAGNOSIS — K21.9 GASTROESOPHAGEAL REFLUX DISEASE, ESOPHAGITIS PRESENCE NOT SPECIFIED: ICD-10-CM

## 2017-07-06 DIAGNOSIS — R94.31 ABNORMAL EKG: ICD-10-CM

## 2017-07-06 DIAGNOSIS — F17.200 SMOKER: ICD-10-CM

## 2017-07-06 DIAGNOSIS — R06.09 DYSPNEA ON EXERTION: Primary | ICD-10-CM

## 2017-07-06 DIAGNOSIS — I10 ESSENTIAL HYPERTENSION: ICD-10-CM

## 2017-07-06 PROCEDURE — 93040 RHYTHM ECG WITH REPORT: CPT | Performed by: NURSE PRACTITIONER

## 2017-07-06 PROCEDURE — 99214 OFFICE O/P EST MOD 30 MIN: CPT | Performed by: NURSE PRACTITIONER

## 2017-07-06 RX ORDER — ESOMEPRAZOLE MAGNESIUM 20 MG/1
20 FOR SUSPENSION ORAL DAILY
COMMUNITY
End: 2018-05-17

## 2017-07-06 RX ORDER — NICOTINE 21 MG/24HR
1 PATCH, TRANSDERMAL 24 HOURS TRANSDERMAL EVERY 24 HOURS
Qty: 30 PATCH | Refills: 3 | Status: SHIPPED | OUTPATIENT
Start: 2017-07-06 | End: 2018-05-17

## 2017-07-06 ASSESSMENT — ENCOUNTER SYMPTOMS
RHINORRHEA: 0
SHORTNESS OF BREATH: 1
BACK PAIN: 1
CHEST TIGHTNESS: 1
WHEEZING: 0

## 2017-07-11 PROBLEM — R94.31 ABNORMAL FINDING ON EKG: Status: ACTIVE | Noted: 2017-07-11

## 2017-07-14 ENCOUNTER — OFFICE VISIT (OUTPATIENT)
Dept: CARDIOLOGY | Age: 57
End: 2017-07-14

## 2017-07-14 VITALS
RESPIRATION RATE: 12 BRPM | HEIGHT: 70 IN | SYSTOLIC BLOOD PRESSURE: 128 MMHG | DIASTOLIC BLOOD PRESSURE: 80 MMHG | HEART RATE: 84 BPM | WEIGHT: 194 LBS | BODY MASS INDEX: 27.77 KG/M2

## 2017-07-14 DIAGNOSIS — I10 ESSENTIAL HYPERTENSION: ICD-10-CM

## 2017-07-14 DIAGNOSIS — R07.9 CHEST PAIN, UNSPECIFIED TYPE: ICD-10-CM

## 2017-07-14 DIAGNOSIS — F17.200 SMOKER: ICD-10-CM

## 2017-07-14 DIAGNOSIS — R94.31 ABNORMAL FINDING ON EKG: Primary | ICD-10-CM

## 2017-07-14 PROCEDURE — 99204 OFFICE O/P NEW MOD 45 MIN: CPT | Performed by: INTERNAL MEDICINE

## 2017-07-14 ASSESSMENT — ENCOUNTER SYMPTOMS
NAUSEA: 0
COLOR CHANGE: 0
APNEA: 0
CHEST TIGHTNESS: 0
SHORTNESS OF BREATH: 0
COUGH: 0
VOMITING: 0

## 2017-07-25 ENCOUNTER — HOSPITAL ENCOUNTER (OUTPATIENT)
Dept: NON INVASIVE DIAGNOSTICS | Age: 57
Discharge: HOME | End: 2017-07-25
Payer: MEDICARE

## 2017-07-25 ENCOUNTER — HOSPITAL ENCOUNTER (OUTPATIENT)
Dept: NUCLEAR MEDICINE | Age: 57
Discharge: HOME | End: 2017-07-25
Payer: MEDICARE

## 2017-07-25 DIAGNOSIS — I10 ESSENTIAL HYPERTENSION: ICD-10-CM

## 2017-07-25 DIAGNOSIS — R94.31 ABNORMAL FINDING ON EKG: ICD-10-CM

## 2017-07-25 LAB
LV EF: 60 %
LVEF MODALITY: NORMAL

## 2017-07-25 PROCEDURE — 93306 TTE W/DOPPLER COMPLETE: CPT

## 2017-07-25 PROCEDURE — 78452 HT MUSCLE IMAGE SPECT MULT: CPT

## 2017-07-25 PROCEDURE — A9502 TC99M TETROFOSMIN: HCPCS | Performed by: INTERNAL MEDICINE

## 2017-07-25 PROCEDURE — 2580000003 HC RX 258: Performed by: INTERNAL MEDICINE

## 2017-07-25 PROCEDURE — 6360000002 HC RX W HCPCS: Performed by: INTERNAL MEDICINE

## 2017-07-25 PROCEDURE — 3430000000 HC RX DIAGNOSTIC RADIOPHARMACEUTICAL: Performed by: INTERNAL MEDICINE

## 2017-07-25 PROCEDURE — 93017 CV STRESS TEST TRACING ONLY: CPT

## 2017-07-25 RX ORDER — SODIUM CHLORIDE 0.9 % (FLUSH) 0.9 %
10 SYRINGE (ML) INJECTION 2 TIMES DAILY
Status: DISCONTINUED | OUTPATIENT
Start: 2017-07-25 | End: 2017-07-28 | Stop reason: HOSPADM

## 2017-07-25 RX ADMIN — TETROFOSMIN 11.8 MILLICURIE: 0.23 INJECTION, POWDER, LYOPHILIZED, FOR SOLUTION INTRAVENOUS at 08:35

## 2017-07-25 RX ADMIN — Medication 10 ML: at 08:35

## 2017-07-25 RX ADMIN — Medication 20 ML: at 10:46

## 2017-07-25 RX ADMIN — TETROFOSMIN 28.4 MILLICURIE: 0.23 INJECTION, POWDER, LYOPHILIZED, FOR SOLUTION INTRAVENOUS at 10:46

## 2017-07-25 RX ADMIN — REGADENOSON 0.4 MG: 0.08 INJECTION, SOLUTION INTRAVENOUS at 10:46

## 2017-08-11 ENCOUNTER — OFFICE VISIT (OUTPATIENT)
Dept: CARDIOLOGY | Age: 57
End: 2017-08-11

## 2017-08-11 VITALS
BODY MASS INDEX: 27.2 KG/M2 | SYSTOLIC BLOOD PRESSURE: 128 MMHG | WEIGHT: 190 LBS | DIASTOLIC BLOOD PRESSURE: 76 MMHG | HEIGHT: 70 IN | RESPIRATION RATE: 12 BRPM | HEART RATE: 80 BPM

## 2017-08-11 DIAGNOSIS — F17.200 SMOKER: ICD-10-CM

## 2017-08-11 DIAGNOSIS — I10 ESSENTIAL HYPERTENSION: Primary | ICD-10-CM

## 2017-08-11 DIAGNOSIS — R31.9 HEMATURIA: ICD-10-CM

## 2017-08-11 PROCEDURE — 99213 OFFICE O/P EST LOW 20 MIN: CPT | Performed by: INTERNAL MEDICINE

## 2017-08-11 RX ORDER — AMLODIPINE BESYLATE 5 MG/1
TABLET ORAL
Refills: 0 | COMMUNITY
Start: 2017-07-15 | End: 2017-11-17

## 2017-08-11 ASSESSMENT — ENCOUNTER SYMPTOMS
CHEST TIGHTNESS: 0
SHORTNESS OF BREATH: 0
APNEA: 0
COUGH: 0
COLOR CHANGE: 0

## 2017-11-17 ENCOUNTER — OFFICE VISIT (OUTPATIENT)
Dept: FAMILY MEDICINE CLINIC | Age: 57
End: 2017-11-17

## 2017-11-17 VITALS
OXYGEN SATURATION: 98 % | HEART RATE: 75 BPM | HEIGHT: 70 IN | BODY MASS INDEX: 27.2 KG/M2 | SYSTOLIC BLOOD PRESSURE: 132 MMHG | DIASTOLIC BLOOD PRESSURE: 86 MMHG | WEIGHT: 190 LBS

## 2017-11-17 DIAGNOSIS — R53.83 FATIGUE, UNSPECIFIED TYPE: ICD-10-CM

## 2017-11-17 DIAGNOSIS — N30.00 ACUTE CYSTITIS WITHOUT HEMATURIA: ICD-10-CM

## 2017-11-17 DIAGNOSIS — I10 ESSENTIAL HYPERTENSION: Primary | ICD-10-CM

## 2017-11-17 DIAGNOSIS — Z12.5 SCREENING PSA (PROSTATE SPECIFIC ANTIGEN): ICD-10-CM

## 2017-11-17 DIAGNOSIS — N13.9 URINARY OBSTRUCTION: ICD-10-CM

## 2017-11-17 DIAGNOSIS — N40.1 BENIGN PROSTATIC HYPERPLASIA WITH LOWER URINARY TRACT SYMPTOMS, SYMPTOM DETAILS UNSPECIFIED: ICD-10-CM

## 2017-11-17 DIAGNOSIS — Z23 NEEDS FLU SHOT: ICD-10-CM

## 2017-11-17 LAB
BILIRUBIN, POC: ABNORMAL
BLOOD URINE, POC: ABNORMAL
CLARITY, POC: ABNORMAL
COLOR, POC: YELLOW
GLUCOSE URINE, POC: ABNORMAL
KETONES, POC: ABNORMAL
LEUKOCYTE EST, POC: ABNORMAL
NITRITE, POC: ABNORMAL
PH, POC: 5
PROTEIN, POC: 25
SPECIFIC GRAVITY, POC: 1.02
UROBILINOGEN, POC: 0.2

## 2017-11-17 PROCEDURE — 90688 IIV4 VACCINE SPLT 0.5 ML IM: CPT | Performed by: FAMILY MEDICINE

## 2017-11-17 PROCEDURE — 99214 OFFICE O/P EST MOD 30 MIN: CPT | Performed by: FAMILY MEDICINE

## 2017-11-17 PROCEDURE — 81003 URINALYSIS AUTO W/O SCOPE: CPT | Performed by: FAMILY MEDICINE

## 2017-11-17 PROCEDURE — G0008 ADMIN INFLUENZA VIRUS VAC: HCPCS | Performed by: FAMILY MEDICINE

## 2017-11-17 RX ORDER — FINASTERIDE 5 MG/1
5 TABLET, FILM COATED ORAL DAILY
COMMUNITY
End: 2018-11-19

## 2017-11-17 RX ORDER — AMLODIPINE BESYLATE 10 MG/1
TABLET ORAL
Refills: 0 | COMMUNITY
Start: 2017-10-16 | End: 2018-08-23 | Stop reason: SDUPTHER

## 2017-11-17 RX ORDER — CIPROFLOXACIN 500 MG/1
500 TABLET, FILM COATED ORAL 2 TIMES DAILY
Qty: 20 TABLET | Refills: 0 | Status: SHIPPED | OUTPATIENT
Start: 2017-11-17 | End: 2017-11-27

## 2017-11-17 NOTE — PROGRESS NOTES
Vaccine Information Sheet, \"Influenza - Inactivated\"  given to Darryle Sam, or parent/legal guardian of  Darryle Sam and verbalized understanding. Patient responses:    Have you ever had a reaction to a flu vaccine? No  Are you able to eat eggs without adverse effects? Yes  Do you have any current illness? No  Have you ever had Guillian Willard Syndrome? No    Flu vaccine given per order. Please see immunization tab. After obtaining consent, and per orders of Dr. Fili Lucero, injection of flu given in Right deltoid by [employer] [facility]. Patient instructed to remain in clinic for 20 minutes afterwards, and to report any adverse reaction to me immediately.

## 2017-11-17 NOTE — PROGRESS NOTES
Chief Complaint   Patient presents with    Hypertension     follow up       HPI:  Tobi English is a 56 y.o. male    Checkup/follow up  Lopressor for HTN  A little tired but ok    History of UTI    Dysuria since last week    He feels tired with the beta blocker and would like to try different bp med    Has cut down on smoking    Past Medical History:   Diagnosis Date    Abnormal finding on EKG 7/11/2017    HTN (hypertension) 1/13/2017     Past Surgical History:   Procedure Laterality Date    CYSTOSCOPY  06/07/2017    [facility] SKIM MD  BILATERAL RETROGRADE PYELOGRAMS BLADDER BX FULGURATION    LEG SURGERY Bilateral     screws and plates in both after broken      History reviewed. No pertinent family history.   Social History     Social History    Marital status: Single     Spouse name: N/A    Number of children: N/A    Years of education: N/A     Social History Main Topics    Smoking status: Current Every Day Smoker     Packs/day: 1.00     Years: 20.00    Smokeless tobacco: Never Used    Alcohol use 3.0 oz/week     5 Shots of liquor per week      Comment: socially    Drug use:      Frequency: 1.0 time per week     Types: Marijuana      Comment: daily    Sexual activity: Not Asked     Other Topics Concern    None     Social History Narrative    None     Current Outpatient Prescriptions   Medication Sig Dispense Refill    amLODIPine (NORVASC) 10 MG tablet   0    aspirin 81 MG tablet Take 81 mg by mouth daily      finasteride (PROSCAR) 5 MG tablet Take 5 mg by mouth daily      ciprofloxacin (CIPRO) 500 MG tablet Take 1 tablet by mouth 2 times daily for 10 days 20 tablet 0    metoprolol tartrate (LOPRESSOR) 25 MG tablet Take 1 tablet by mouth 2 times daily 60 tablet 5    esomeprazole Magnesium (NEXIUM) 20 MG PACK Take 20 mg by mouth daily      Ca Carbonate-Mag Hydroxide (ROLAIDS PO) Take by mouth      nicotine (NICODERM CQ) 14 MG/24HR Place 1 patch onto the skin every 24 hours 30 patch 3    tamsulosin

## 2017-11-19 LAB — URINE CULTURE, ROUTINE: NORMAL

## 2017-11-27 DIAGNOSIS — R53.83 FATIGUE, UNSPECIFIED TYPE: ICD-10-CM

## 2017-11-27 DIAGNOSIS — I10 ESSENTIAL HYPERTENSION: ICD-10-CM

## 2017-11-27 DIAGNOSIS — Z12.5 SCREENING PSA (PROSTATE SPECIFIC ANTIGEN): ICD-10-CM

## 2017-11-27 LAB
ALBUMIN SERPL-MCNC: 4 G/DL (ref 3.9–4.9)
ALP BLD-CCNC: 80 U/L (ref 35–104)
ALT SERPL-CCNC: 20 U/L (ref 0–41)
ANION GAP SERPL CALCULATED.3IONS-SCNC: 19 MEQ/L (ref 7–13)
AST SERPL-CCNC: 17 U/L (ref 0–40)
BILIRUB SERPL-MCNC: 0.3 MG/DL (ref 0–1.2)
BUN BLDV-MCNC: 18 MG/DL (ref 6–20)
CALCIUM SERPL-MCNC: 9 MG/DL (ref 8.6–10.2)
CHLORIDE BLD-SCNC: 106 MEQ/L (ref 98–107)
CHOLESTEROL, TOTAL: 215 MG/DL (ref 0–199)
CO2: 21 MEQ/L (ref 22–29)
CREAT SERPL-MCNC: 0.76 MG/DL (ref 0.7–1.2)
GFR AFRICAN AMERICAN: >60
GFR NON-AFRICAN AMERICAN: >60
GLOBULIN: 2.8 G/DL (ref 2.3–3.5)
GLUCOSE BLD-MCNC: 95 MG/DL (ref 74–109)
HCT VFR BLD CALC: 49.2 % (ref 42–52)
HDLC SERPL-MCNC: 31 MG/DL (ref 40–59)
HEMOGLOBIN: 16.6 G/DL (ref 14–18)
LDL CHOLESTEROL CALCULATED: 139 MG/DL (ref 0–129)
MCH RBC QN AUTO: 31.9 PG (ref 27–31.3)
MCHC RBC AUTO-ENTMCNC: 33.8 % (ref 33–37)
MCV RBC AUTO: 94.3 FL (ref 80–100)
PDW BLD-RTO: 14.4 % (ref 11.5–14.5)
PLATELET # BLD: 246 K/UL (ref 130–400)
POTASSIUM SERPL-SCNC: 4.7 MEQ/L (ref 3.5–5.1)
PROSTATE SPECIFIC ANTIGEN: 0.3 NG/ML (ref 0–3.89)
RBC # BLD: 5.22 M/UL (ref 4.7–6.1)
SODIUM BLD-SCNC: 146 MEQ/L (ref 132–144)
TOTAL PROTEIN: 6.8 G/DL (ref 6.4–8.1)
TRIGL SERPL-MCNC: 227 MG/DL (ref 0–200)
TSH SERPL DL<=0.05 MIU/L-ACNC: 3.3 UIU/ML (ref 0.27–4.2)
WBC # BLD: 8.9 K/UL (ref 4.8–10.8)

## 2017-11-29 LAB — TESTOSTERONE TOTAL-MALE: 511 NG/DL (ref 300–890)

## 2017-12-23 DIAGNOSIS — I10 ESSENTIAL HYPERTENSION: ICD-10-CM

## 2017-12-26 RX ORDER — AMLODIPINE BESYLATE 10 MG/1
TABLET ORAL
Qty: 30 TABLET | Refills: 5 | Status: SHIPPED | OUTPATIENT
Start: 2017-12-26 | End: 2018-05-17

## 2018-01-22 RX ORDER — TAMSULOSIN HYDROCHLORIDE 0.4 MG/1
CAPSULE ORAL
Qty: 90 CAPSULE | Refills: 3 | Status: SHIPPED | OUTPATIENT
Start: 2018-01-22 | End: 2018-11-08 | Stop reason: SDUPTHER

## 2018-05-17 ENCOUNTER — OFFICE VISIT (OUTPATIENT)
Dept: FAMILY MEDICINE CLINIC | Age: 58
End: 2018-05-17
Payer: MEDICARE

## 2018-05-17 VITALS
OXYGEN SATURATION: 96 % | WEIGHT: 193 LBS | SYSTOLIC BLOOD PRESSURE: 128 MMHG | HEART RATE: 78 BPM | HEIGHT: 70 IN | BODY MASS INDEX: 27.63 KG/M2 | DIASTOLIC BLOOD PRESSURE: 82 MMHG

## 2018-05-17 DIAGNOSIS — L30.9 DERMATITIS: ICD-10-CM

## 2018-05-17 DIAGNOSIS — I10 ESSENTIAL HYPERTENSION: Primary | ICD-10-CM

## 2018-05-17 DIAGNOSIS — J30.0 CHRONIC VASOMOTOR RHINITIS: ICD-10-CM

## 2018-05-17 DIAGNOSIS — N13.9 URINARY OBSTRUCTION: ICD-10-CM

## 2018-05-17 PROCEDURE — 99213 OFFICE O/P EST LOW 20 MIN: CPT | Performed by: FAMILY MEDICINE

## 2018-05-17 RX ORDER — OMEPRAZOLE 20 MG/1
20 CAPSULE, DELAYED RELEASE ORAL DAILY
COMMUNITY
End: 2021-01-21 | Stop reason: SINTOL

## 2018-05-17 RX ORDER — FLUTICASONE PROPIONATE 50 MCG
2 SPRAY, SUSPENSION (ML) NASAL DAILY
Qty: 1 BOTTLE | Refills: 3 | Status: SHIPPED | OUTPATIENT
Start: 2018-05-17 | End: 2020-06-19

## 2018-05-17 ASSESSMENT — PATIENT HEALTH QUESTIONNAIRE - PHQ9
1. LITTLE INTEREST OR PLEASURE IN DOING THINGS: 0
SUM OF ALL RESPONSES TO PHQ9 QUESTIONS 1 & 2: 0
SUM OF ALL RESPONSES TO PHQ QUESTIONS 1-9: 0
2. FEELING DOWN, DEPRESSED OR HOPELESS: 0

## 2018-07-10 DIAGNOSIS — N13.9 URINARY OBSTRUCTION: ICD-10-CM

## 2018-07-10 DIAGNOSIS — I10 ESSENTIAL HYPERTENSION: ICD-10-CM

## 2018-07-10 LAB
ALBUMIN SERPL-MCNC: 4 G/DL (ref 3.9–4.9)
ALP BLD-CCNC: 76 U/L (ref 35–104)
ALT SERPL-CCNC: 22 U/L (ref 0–41)
ANION GAP SERPL CALCULATED.3IONS-SCNC: 15 MEQ/L (ref 7–13)
AST SERPL-CCNC: 18 U/L (ref 0–40)
BILIRUB SERPL-MCNC: 0.3 MG/DL (ref 0–1.2)
BUN BLDV-MCNC: 13 MG/DL (ref 6–20)
CALCIUM SERPL-MCNC: 8.9 MG/DL (ref 8.6–10.2)
CHLORIDE BLD-SCNC: 102 MEQ/L (ref 98–107)
CHOLESTEROL, TOTAL: 226 MG/DL (ref 0–199)
CO2: 24 MEQ/L (ref 22–29)
CREAT SERPL-MCNC: 0.62 MG/DL (ref 0.7–1.2)
GFR AFRICAN AMERICAN: >60
GFR NON-AFRICAN AMERICAN: >60
GLOBULIN: 2.8 G/DL (ref 2.3–3.5)
GLUCOSE BLD-MCNC: 85 MG/DL (ref 74–109)
HCT VFR BLD CALC: 50.1 % (ref 42–52)
HDLC SERPL-MCNC: 29 MG/DL (ref 40–59)
HEMOGLOBIN: 16.9 G/DL (ref 14–18)
LDL CHOLESTEROL CALCULATED: 171 MG/DL (ref 0–129)
MCH RBC QN AUTO: 31.5 PG (ref 27–31.3)
MCHC RBC AUTO-ENTMCNC: 33.7 % (ref 33–37)
MCV RBC AUTO: 93.3 FL (ref 80–100)
PDW BLD-RTO: 14.5 % (ref 11.5–14.5)
PLATELET # BLD: 261 K/UL (ref 130–400)
POTASSIUM SERPL-SCNC: 4 MEQ/L (ref 3.5–5.1)
PROSTATE SPECIFIC ANTIGEN: 0.8 NG/ML (ref 0–3.89)
RBC # BLD: 5.37 M/UL (ref 4.7–6.1)
SODIUM BLD-SCNC: 141 MEQ/L (ref 132–144)
TOTAL PROTEIN: 6.8 G/DL (ref 6.4–8.1)
TRIGL SERPL-MCNC: 131 MG/DL (ref 0–200)
WBC # BLD: 7.9 K/UL (ref 4.8–10.8)

## 2018-07-16 DIAGNOSIS — I10 ESSENTIAL HYPERTENSION: ICD-10-CM

## 2018-07-16 RX ORDER — AMLODIPINE BESYLATE 10 MG/1
TABLET ORAL
Qty: 30 TABLET | Refills: 5 | Status: SHIPPED | OUTPATIENT
Start: 2018-07-16 | End: 2018-11-19 | Stop reason: SDUPTHER

## 2018-08-23 ENCOUNTER — OFFICE VISIT (OUTPATIENT)
Dept: FAMILY MEDICINE CLINIC | Age: 58
End: 2018-08-23
Payer: MEDICARE

## 2018-08-23 VITALS
SYSTOLIC BLOOD PRESSURE: 106 MMHG | TEMPERATURE: 99.5 F | HEART RATE: 85 BPM | OXYGEN SATURATION: 98 % | HEIGHT: 70 IN | WEIGHT: 196.2 LBS | BODY MASS INDEX: 28.09 KG/M2 | DIASTOLIC BLOOD PRESSURE: 60 MMHG

## 2018-08-23 DIAGNOSIS — F17.200 SMOKER: ICD-10-CM

## 2018-08-23 DIAGNOSIS — J31.2 CHRONIC PHARYNGITIS: Primary | ICD-10-CM

## 2018-08-23 DIAGNOSIS — J31.2 CHRONIC PHARYNGITIS: ICD-10-CM

## 2018-08-23 LAB — S PYO AG THROAT QL: NORMAL

## 2018-08-23 PROCEDURE — 87880 STREP A ASSAY W/OPTIC: CPT | Performed by: NURSE PRACTITIONER

## 2018-08-23 PROCEDURE — 99213 OFFICE O/P EST LOW 20 MIN: CPT | Performed by: NURSE PRACTITIONER

## 2018-08-23 ASSESSMENT — ENCOUNTER SYMPTOMS
VOMITING: 0
COUGH: 1
SWOLLEN GLANDS: 1
NAUSEA: 0
SORE THROAT: 1

## 2018-08-26 LAB — THROAT CULTURE: NORMAL

## 2018-09-27 PROBLEM — N39.0 UTI (URINARY TRACT INFECTION): Status: RESOLVED | Noted: 2017-01-13 | Resolved: 2018-09-27

## 2018-11-12 RX ORDER — TAMSULOSIN HYDROCHLORIDE 0.4 MG/1
CAPSULE ORAL
Qty: 90 CAPSULE | Refills: 3 | Status: SHIPPED | OUTPATIENT
Start: 2018-11-12 | End: 2020-01-24

## 2018-11-19 ENCOUNTER — OFFICE VISIT (OUTPATIENT)
Dept: FAMILY MEDICINE CLINIC | Age: 58
End: 2018-11-19
Payer: MEDICARE

## 2018-11-19 VITALS
HEART RATE: 76 BPM | DIASTOLIC BLOOD PRESSURE: 68 MMHG | SYSTOLIC BLOOD PRESSURE: 120 MMHG | BODY MASS INDEX: 29.2 KG/M2 | WEIGHT: 204 LBS | OXYGEN SATURATION: 98 % | HEIGHT: 70 IN

## 2018-11-19 DIAGNOSIS — Z12.5 SCREENING PSA (PROSTATE SPECIFIC ANTIGEN): ICD-10-CM

## 2018-11-19 DIAGNOSIS — K08.89 TOOTH PAIN: ICD-10-CM

## 2018-11-19 DIAGNOSIS — Z23 NEEDS FLU SHOT: ICD-10-CM

## 2018-11-19 DIAGNOSIS — N40.0 BENIGN PROSTATIC HYPERPLASIA, UNSPECIFIED WHETHER LOWER URINARY TRACT SYMPTOMS PRESENT: ICD-10-CM

## 2018-11-19 DIAGNOSIS — I10 ESSENTIAL HYPERTENSION: Primary | ICD-10-CM

## 2018-11-19 PROCEDURE — 90688 IIV4 VACCINE SPLT 0.5 ML IM: CPT | Performed by: FAMILY MEDICINE

## 2018-11-19 PROCEDURE — 99213 OFFICE O/P EST LOW 20 MIN: CPT | Performed by: FAMILY MEDICINE

## 2018-11-19 PROCEDURE — G0008 ADMIN INFLUENZA VIRUS VAC: HCPCS | Performed by: FAMILY MEDICINE

## 2018-11-19 RX ORDER — AMOXICILLIN 875 MG/1
875 TABLET, COATED ORAL 2 TIMES DAILY
Qty: 20 TABLET | Refills: 0 | Status: SHIPPED | OUTPATIENT
Start: 2018-11-19 | End: 2018-11-29

## 2018-11-19 RX ORDER — AMLODIPINE BESYLATE 10 MG/1
TABLET ORAL
Qty: 90 TABLET | Refills: 3 | Status: SHIPPED | OUTPATIENT
Start: 2018-11-19 | End: 2020-01-07

## 2018-11-19 RX ORDER — TAMSULOSIN HYDROCHLORIDE 0.4 MG/1
CAPSULE ORAL
Qty: 90 CAPSULE | Refills: 3 | Status: CANCELLED | OUTPATIENT
Start: 2018-11-19

## 2018-11-19 NOTE — PROGRESS NOTES
Chief Complaint   Patient presents with    Hypertension     check up       HPI:  Rolene  is a 66 y.o. male    Checkup/follow up  Lopressor/norvasc for HTN    Smoker  Wants to quit    Otherwise feeling ok  Forgot to fast for labs    Tooth pain        Past Medical History:   Diagnosis Date    Abnormal finding on EKG 7/11/2017    HTN (hypertension) 1/13/2017     Past Surgical History:   Procedure Laterality Date    CYSTOSCOPY  06/07/2017    [facility] SKIM MD  BILATERAL RETROGRADE PYELOGRAMS BLADDER BX FULGURATION    LEG SURGERY Bilateral     screws and plates in both after broken      History reviewed. No pertinent family history.   Social History     Social History    Marital status: Single     Spouse name: N/A    Number of children: N/A    Years of education: N/A     Social History Main Topics    Smoking status: Current Every Day Smoker     Packs/day: 1.00     Years: 20.00    Smokeless tobacco: Never Used    Alcohol use 3.0 oz/week     5 Shots of liquor per week      Comment: socially    Drug use: Yes     Frequency: 1.0 time per week     Types: Marijuana      Comment: daily    Sexual activity: Not Asked     Other Topics Concern    None     Social History Narrative    None     Current Outpatient Prescriptions   Medication Sig Dispense Refill    metoprolol tartrate (LOPRESSOR) 25 MG tablet take 1 tablet by mouth twice a day 180 tablet 3    amLODIPine (NORVASC) 10 MG tablet take 1 tablet by mouth once daily 90 tablet 3    amoxicillin (AMOXIL) 875 MG tablet Take 1 tablet by mouth 2 times daily for 10 days 20 tablet 0    tamsulosin (FLOMAX) 0.4 MG capsule take 1 capsule by mouth once daily 90 capsule 3    triamcinolone (KENALOG) 0.1 % ointment       omeprazole (PRILOSEC) 20 MG delayed release capsule Take 20 mg by mouth daily      fluticasone (FLONASE) 50 MCG/ACT nasal spray 2 sprays by Nasal route daily 1 Bottle 3    aspirin 81 MG tablet Take 81 mg by mouth daily       No current facility-administered medications for this visit. Allergies   Allergen Reactions    Statins     Proscar [Finasteride] Hives and Swelling       Review of Systems:   General ROS: some fatigue  Respiratory ROS: no cough, shortness of breath, or wheezing  Cardiovascular ROS: no chest pain or dyspnea on exertion  Gastrointestinal ROS: no abdominal pain, change in bowel habits, or black or bloody stools  Genito-Urinary ROS:dysuria  Musculoskeletal ROS: walks with cane, hip pain, history of bilateral leg fractures  Neurological ROS: reports chronic balance difficulty    In general patient otherwise reports feeling well. Physical Exam:  /68 (Site: Right Upper Arm)   Pulse 76   Ht 5' 10\" (1.778 m)   Wt 204 lb (92.5 kg)   SpO2 98%   BMI 29.27 kg/m²     Gen: Well, NAD, Alert, Oriented x 3   HEENT: EOMI, eyes clear, MMM, poor dentition  Skin: without rash or jaundice  Neck: no significant lymphadenopathy or thyromegaly  Lungs: CTA B w/out Rales/Wheezes/Rhonchi, Good respiratory effort   Heart: RRR, S1S2, w/out M/R/G, non-displaced PMI   Abdomen: Soft NT/ND, w/out R/G, w/ +BSx4   Ext: No C/C/E Bilaterally. Neuro: Neurovascularly intact w/ Sensory/Motor intact UE/LE Bilaterally. Lab Results   Component Value Date    WBC 7.9 07/10/2018    HGB 16.9 07/10/2018    HCT 50.1 07/10/2018     07/10/2018    CHOL 226 (H) 07/10/2018    TRIG 131 07/10/2018    HDL 29 (L) 07/10/2018    ALT 22 07/10/2018    AST 18 07/10/2018     07/10/2018    K 4.0 07/10/2018     07/10/2018    CREATININE 0.62 (L) 07/10/2018    BUN 13 07/10/2018    CO2 24 07/10/2018    TSH 3.300 11/27/2017    PSA 0.80 07/10/2018    INR 1.0 10/19/2011         A&P   Diagnosis Orders   1. Essential hypertension  metoprolol tartrate (LOPRESSOR) 25 MG tablet    amLODIPine (NORVASC) 10 MG tablet    Comprehensive Metabolic Panel    Lipid Panel    CBC   2. Needs flu shot  INFLUENZA, QUADV, 3 YRS AND OLDER, IM, MDV, 0.5ML (FLUZONE QUADV)   3.

## 2018-11-20 DIAGNOSIS — I10 ESSENTIAL HYPERTENSION: ICD-10-CM

## 2018-11-20 DIAGNOSIS — Z12.5 SCREENING PSA (PROSTATE SPECIFIC ANTIGEN): ICD-10-CM

## 2018-11-20 LAB
ALBUMIN SERPL-MCNC: 4 G/DL (ref 3.9–4.9)
ALP BLD-CCNC: 64 U/L (ref 35–104)
ALT SERPL-CCNC: 13 U/L (ref 0–41)
ANION GAP SERPL CALCULATED.3IONS-SCNC: 11 MEQ/L (ref 7–13)
AST SERPL-CCNC: 18 U/L (ref 0–40)
BILIRUB SERPL-MCNC: 0.4 MG/DL (ref 0–1.2)
BUN BLDV-MCNC: 14 MG/DL (ref 6–20)
CALCIUM SERPL-MCNC: 9.4 MG/DL (ref 8.6–10.2)
CHLORIDE BLD-SCNC: 106 MEQ/L (ref 98–107)
CHOLESTEROL, TOTAL: 218 MG/DL (ref 0–199)
CO2: 26 MEQ/L (ref 22–29)
CREAT SERPL-MCNC: 0.99 MG/DL (ref 0.7–1.2)
GFR AFRICAN AMERICAN: >60
GFR NON-AFRICAN AMERICAN: >60
GLOBULIN: 3 G/DL (ref 2.3–3.5)
GLUCOSE BLD-MCNC: 105 MG/DL (ref 74–109)
HCT VFR BLD CALC: 48.1 % (ref 42–52)
HDLC SERPL-MCNC: 24 MG/DL (ref 40–59)
HEMOGLOBIN: 16.4 G/DL (ref 14–18)
LDL CHOLESTEROL CALCULATED: 172 MG/DL (ref 0–129)
MCH RBC QN AUTO: 31.6 PG (ref 27–31.3)
MCHC RBC AUTO-ENTMCNC: 34 % (ref 33–37)
MCV RBC AUTO: 92.8 FL (ref 80–100)
PDW BLD-RTO: 14.8 % (ref 11.5–14.5)
PLATELET # BLD: 249 K/UL (ref 130–400)
POTASSIUM SERPL-SCNC: 5.3 MEQ/L (ref 3.5–5.1)
PROSTATE SPECIFIC ANTIGEN: 0.46 NG/ML (ref 0–3.89)
RBC # BLD: 5.19 M/UL (ref 4.7–6.1)
SODIUM BLD-SCNC: 143 MEQ/L (ref 132–144)
TOTAL PROTEIN: 7 G/DL (ref 6.4–8.1)
TRIGL SERPL-MCNC: 110 MG/DL (ref 0–200)
WBC # BLD: 7.4 K/UL (ref 4.8–10.8)

## 2019-10-28 ENCOUNTER — OFFICE VISIT (OUTPATIENT)
Dept: FAMILY MEDICINE CLINIC | Age: 59
End: 2019-10-28
Payer: MEDICARE

## 2019-10-28 VITALS
RESPIRATION RATE: 16 BRPM | DIASTOLIC BLOOD PRESSURE: 62 MMHG | BODY MASS INDEX: 27.72 KG/M2 | OXYGEN SATURATION: 98 % | TEMPERATURE: 97.6 F | WEIGHT: 193.6 LBS | HEART RATE: 78 BPM | HEIGHT: 70 IN | SYSTOLIC BLOOD PRESSURE: 110 MMHG

## 2019-10-28 DIAGNOSIS — Z23 NEED FOR INFLUENZA VACCINATION: ICD-10-CM

## 2019-10-28 DIAGNOSIS — Z12.5 SCREENING PSA (PROSTATE SPECIFIC ANTIGEN): ICD-10-CM

## 2019-10-28 DIAGNOSIS — I10 ESSENTIAL HYPERTENSION: Primary | ICD-10-CM

## 2019-10-28 DIAGNOSIS — E78.5 HYPERLIPIDEMIA, UNSPECIFIED HYPERLIPIDEMIA TYPE: ICD-10-CM

## 2019-10-28 PROCEDURE — G0008 ADMIN INFLUENZA VIRUS VAC: HCPCS | Performed by: NURSE PRACTITIONER

## 2019-10-28 PROCEDURE — 90686 IIV4 VACC NO PRSV 0.5 ML IM: CPT | Performed by: NURSE PRACTITIONER

## 2019-10-28 PROCEDURE — 99213 OFFICE O/P EST LOW 20 MIN: CPT | Performed by: NURSE PRACTITIONER

## 2019-10-28 ASSESSMENT — PATIENT HEALTH QUESTIONNAIRE - PHQ9
SUM OF ALL RESPONSES TO PHQ QUESTIONS 1-9: 0
SUM OF ALL RESPONSES TO PHQ9 QUESTIONS 1 & 2: 0
SUM OF ALL RESPONSES TO PHQ QUESTIONS 1-9: 0
2. FEELING DOWN, DEPRESSED OR HOPELESS: 0
1. LITTLE INTEREST OR PLEASURE IN DOING THINGS: 0

## 2019-10-28 ASSESSMENT — ENCOUNTER SYMPTOMS
COUGH: 0
BACK PAIN: 0
SHORTNESS OF BREATH: 0

## 2019-10-31 ENCOUNTER — TELEPHONE (OUTPATIENT)
Dept: FAMILY MEDICINE CLINIC | Age: 59
End: 2019-10-31

## 2020-01-07 RX ORDER — AMLODIPINE BESYLATE 10 MG/1
TABLET ORAL
Qty: 90 TABLET | Refills: 3 | Status: SHIPPED | OUTPATIENT
Start: 2020-01-07 | End: 2021-01-21 | Stop reason: SDUPTHER

## 2020-01-24 RX ORDER — TAMSULOSIN HYDROCHLORIDE 0.4 MG/1
CAPSULE ORAL
Qty: 60 CAPSULE | Refills: 0 | Status: SHIPPED | OUTPATIENT
Start: 2020-01-24 | End: 2020-06-19

## 2020-06-19 ENCOUNTER — VIRTUAL VISIT (OUTPATIENT)
Dept: PRIMARY CARE CLINIC | Age: 60
End: 2020-06-19
Payer: MEDICARE

## 2020-06-19 PROCEDURE — G0438 PPPS, INITIAL VISIT: HCPCS | Performed by: NURSE PRACTITIONER

## 2020-06-19 ASSESSMENT — LIFESTYLE VARIABLES
HOW OFTEN DURING THE LAST YEAR HAVE YOU FOUND THAT YOU WERE NOT ABLE TO STOP DRINKING ONCE YOU HAD STARTED: 0
HOW OFTEN DURING THE LAST YEAR HAVE YOU FAILED TO DO WHAT WAS NORMALLY EXPECTED FROM YOU BECAUSE OF DRINKING: 0
AUDIT-C TOTAL SCORE: 1
HOW OFTEN DURING THE LAST YEAR HAVE YOU HAD A FEELING OF GUILT OR REMORSE AFTER DRINKING: 0
HOW OFTEN DURING THE LAST YEAR HAVE YOU NEEDED AN ALCOHOLIC DRINK FIRST THING IN THE MORNING TO GET YOURSELF GOING AFTER A NIGHT OF HEAVY DRINKING: 0
HAVE YOU OR SOMEONE ELSE BEEN INJURED AS A RESULT OF YOUR DRINKING: 0
HOW OFTEN DURING THE LAST YEAR HAVE YOU BEEN UNABLE TO REMEMBER WHAT HAPPENED THE NIGHT BEFORE BECAUSE YOU HAD BEEN DRINKING: 0
HOW MANY STANDARD DRINKS CONTAINING ALCOHOL DO YOU HAVE ON A TYPICAL DAY: 0
HOW OFTEN DO YOU HAVE A DRINK CONTAINING ALCOHOL: 1
AUDIT TOTAL SCORE: 1
HOW OFTEN DO YOU HAVE SIX OR MORE DRINKS ON ONE OCCASION: 0
HAS A RELATIVE, FRIEND, DOCTOR, OR ANOTHER HEALTH PROFESSIONAL EXPRESSED CONCERN ABOUT YOUR DRINKING OR SUGGESTED YOU CUT DOWN: 0

## 2020-06-19 ASSESSMENT — PATIENT HEALTH QUESTIONNAIRE - PHQ9
SUM OF ALL RESPONSES TO PHQ QUESTIONS 1-9: 0
SUM OF ALL RESPONSES TO PHQ QUESTIONS 1-9: 0

## 2020-06-19 NOTE — PROGRESS NOTES
evaluation of cognition reveals recent and remote memory intact. Patient's complete Health Risk Assessment and screening values have been reviewed and are found in Flowsheets. The following problems were reviewed today and where indicated follow up appointments were made and/or referrals ordered.     Positive Risk Factor Screenings with Interventions:     Substance Abuse:  Social History     Tobacco History     Smoking Status  Current Every Day Smoker Smoking Frequency  1 pack/day for 20 years (20 pk yrs)    Smokeless Tobacco Use  Never Used          Alcohol History     Alcohol Use Status  Yes Drinks/Week  5 Shots of liquor per week Amount  5.0 standard drinks of alcohol/wk Comment  socially          Drug Use     Drug Use Status  Yes Types  Marijuana Frequency   1 time/week Comment  daily          Sexual Activity     Sexually Active  Not Asked               Audit Questionnaire: Screen for Alcohol Misuse  How often do you have a drink containing alcohol?: Monthly or less  How many standard drinks containing alcohol do you have on a typical day when drinking?: One or two  How often do you have six or more drinks on one occasion?: Never  Audit-C Score: 1  During the past year, how often have you found that you were not able to stop drinking once you had started?: Never  During the past year, how often have you failed to do what was normally expected of you because of drinking?: Never  During the past year, how often have you needed a drink in the morning to get yourself going after a heavy drinking session?: Never  During the past year, how often have you had a feeling of guilt or remorse after drinking?: Never  During the past year, have you been unable to remember what happened the night before because you had been drinking?: Never  Have you or someone else been injured as a result of your drinking?: No  Has a relative or friend, doctor or health worker been concerned about your drinking or suggested you cut

## 2021-01-15 DIAGNOSIS — I10 ESSENTIAL HYPERTENSION: ICD-10-CM

## 2021-01-21 ENCOUNTER — OFFICE VISIT (OUTPATIENT)
Dept: FAMILY MEDICINE CLINIC | Age: 61
End: 2021-01-21
Payer: MEDICARE

## 2021-01-21 VITALS
WEIGHT: 186.9 LBS | HEART RATE: 73 BPM | OXYGEN SATURATION: 98 % | BODY MASS INDEX: 26.76 KG/M2 | SYSTOLIC BLOOD PRESSURE: 126 MMHG | DIASTOLIC BLOOD PRESSURE: 74 MMHG | TEMPERATURE: 98.2 F | HEIGHT: 70 IN

## 2021-01-21 DIAGNOSIS — N30.00 ACUTE CYSTITIS WITHOUT HEMATURIA: ICD-10-CM

## 2021-01-21 DIAGNOSIS — Z23 NEEDS FLU SHOT: ICD-10-CM

## 2021-01-21 DIAGNOSIS — Z12.5 SCREENING PSA (PROSTATE SPECIFIC ANTIGEN): ICD-10-CM

## 2021-01-21 DIAGNOSIS — I10 ESSENTIAL HYPERTENSION: Primary | ICD-10-CM

## 2021-01-21 LAB
BILIRUBIN, POC: ABNORMAL
BLOOD URINE, POC: ABNORMAL
CLARITY, POC: ABNORMAL
COLOR, POC: YELLOW
GLUCOSE URINE, POC: ABNORMAL
KETONES, POC: 5
LEUKOCYTE EST, POC: ABNORMAL
NITRITE, POC: POSITIVE
PH, POC: 7
PROTEIN, POC: 15
SPECIFIC GRAVITY, POC: 1.01
UROBILINOGEN, POC: 0.2

## 2021-01-21 PROCEDURE — 99213 OFFICE O/P EST LOW 20 MIN: CPT | Performed by: FAMILY MEDICINE

## 2021-01-21 PROCEDURE — 90688 IIV4 VACCINE SPLT 0.5 ML IM: CPT | Performed by: FAMILY MEDICINE

## 2021-01-21 PROCEDURE — G0008 ADMIN INFLUENZA VIRUS VAC: HCPCS | Performed by: FAMILY MEDICINE

## 2021-01-21 PROCEDURE — 81002 URINALYSIS NONAUTO W/O SCOPE: CPT | Performed by: FAMILY MEDICINE

## 2021-01-21 RX ORDER — CIPROFLOXACIN 500 MG/1
500 TABLET, FILM COATED ORAL 2 TIMES DAILY
Qty: 20 TABLET | Refills: 0 | Status: SHIPPED | OUTPATIENT
Start: 2021-01-21 | End: 2021-08-20 | Stop reason: SDUPTHER

## 2021-01-21 RX ORDER — AMLODIPINE BESYLATE 10 MG/1
TABLET ORAL
Qty: 90 TABLET | Refills: 3 | Status: SHIPPED | OUTPATIENT
Start: 2021-01-21 | End: 2021-11-12 | Stop reason: SDUPTHER

## 2021-01-21 ASSESSMENT — PATIENT HEALTH QUESTIONNAIRE - PHQ9
SUM OF ALL RESPONSES TO PHQ QUESTIONS 1-9: 0
SUM OF ALL RESPONSES TO PHQ QUESTIONS 1-9: 0

## 2021-01-21 NOTE — PROGRESS NOTES
After obtaining consent, and per orders of Dr. Jean Randolph, injection of flu given in Right deltoid by [facility]. Patient instructed to remain in clinic for 20 minutes afterwards, and to report any adverse reaction to me immediately. Vaccine Information Sheet, \"Influenza - Inactivated\"  given to Christel Coronel, or parent/legal guardian of  Christel Coronel and verbalized understanding. Patient responses:    Have you ever had a reaction to a flu vaccine? No  Are you able to eat eggs without adverse effects? Yes  Do you have any current illness? No  Have you ever had Guillian Wilton Syndrome? No    Flu vaccine given per order. Please see immunization tab.

## 2021-01-21 NOTE — PROGRESS NOTES
Chief Complaint   Patient presents with    Hypertension    Urinary Tract Infection     x 2 weeks, bladder infection, feels foggy, fatigue, can't empty bladder       HPI:  Nancylee Angelucci is a 64 y.o. male    Checkup/follow up  Lopressor/norvasc for HTN    Smoker  Wants to quit    Otherwise feeling ok    UTI symptoms for 2 weeks  Known BPH history  Was on flomax  Has not had f/u with urology in a while    Feeling a little foggy/fatigued    States he has had them before    Actually had workup for hematuria in past as well    Past Medical History:   Diagnosis Date    Abnormal finding on EKG 7/11/2017    HTN (hypertension) 1/13/2017     Past Surgical History:   Procedure Laterality Date    CYSTOSCOPY  06/07/2017    [facility] SKIM MD  BILATERAL RETROGRADE PYELOGRAMS BLADDER BX FULGURATION    LEG SURGERY Bilateral     screws and plates in both after broken      History reviewed. No pertinent family history. Social History     Socioeconomic History    Marital status: Single     Spouse name: None    Number of children: None    Years of education: None    Highest education level: None   Occupational History    None   Social Needs    Financial resource strain: None    Food insecurity     Worry: None     Inability: None    Transportation needs     Medical: None     Non-medical: None   Tobacco Use    Smoking status: Current Every Day Smoker     Packs/day: 1.00     Years: 20.00     Pack years: 20.00    Smokeless tobacco: Never Used   Substance and Sexual Activity    Alcohol use:  Yes     Alcohol/week: 5.0 standard drinks     Types: 5 Shots of liquor per week     Comment: socially    Drug use: Yes     Frequency: 1.0 times per week     Types: Marijuana     Comment: daily    Sexual activity: None   Lifestyle    Physical activity     Days per week: None     Minutes per session: None    Stress: None   Relationships    Social connections     Talks on phone: None     Gets together: None     Attends Sikh service: None     Active member of club or organization: None     Attends meetings of clubs or organizations: None     Relationship status: None    Intimate partner violence     Fear of current or ex partner: None     Emotionally abused: None     Physically abused: None     Forced sexual activity: None   Other Topics Concern    None   Social History Narrative    None     Current Outpatient Medications   Medication Sig Dispense Refill    amLODIPine (NORVASC) 10 MG tablet take 1 tablet by mouth once daily 90 tablet 3    metoprolol tartrate (LOPRESSOR) 25 MG tablet take 1 tablet by mouth twice a day 180 tablet 3    ciprofloxacin (CIPRO) 500 MG tablet Take 1 tablet by mouth 2 times daily for 10 days 20 tablet 0    aspirin 81 MG tablet Take 81 mg by mouth daily       No current facility-administered medications for this visit. Allergies   Allergen Reactions    Statins     Proscar [Finasteride] Hives and Swelling       Review of Systems:   General ROS: some fatigue  Respiratory ROS: no cough, shortness of breath, or wheezing  Cardiovascular ROS: no chest pain or dyspnea on exertion  Gastrointestinal ROS: no abdominal pain, change in bowel habits, or black or bloody stools  Genito-Urinary ROS:dysuria  Musculoskeletal ROS: walks with cane, hip pain, history of bilateral leg fractures  Neurological ROS: reports chronic balance difficulty    In general patient otherwise reports feeling well. Physical Exam:  /74 (Site: Left Upper Arm)   Pulse 73   Temp 98.2 °F (36.8 °C)   Ht 5' 10\" (1.778 m)   Wt 186 lb 14.4 oz (84.8 kg)   SpO2 98%   BMI 26.82 kg/m²     Gen: Well, NAD, Alert, Oriented x 3   HEENT: EOMI, eyes clear, MMM, poor dentition  Skin: without rash or jaundice  Neck: no significant lymphadenopathy or thyromegaly  Lungs: CTA B w/out Rales/Wheezes/Rhonchi, Good respiratory effort   Heart: RRR, S1S2, w/out M/R/G, non-displaced PMI   Abdomen: Soft NT/ND, w/out R/G, w/ +BSx4   Ext: No C/C/E Bilaterally. Neuro: Neurovascularly intact w/ Sensory/Motor intact UE/LE Bilaterally. Lab Results   Component Value Date    WBC 7.4 11/20/2018    HGB 16.4 11/20/2018    HCT 48.1 11/20/2018     11/20/2018    CHOL 226 (H) 10/31/2019    TRIG 147 10/31/2019    HDL 26 (L) 10/31/2019    ALT 14 10/31/2019    AST 14 10/31/2019     10/31/2019    K 4.7 10/31/2019     10/31/2019    CREATININE 0.88 10/31/2019    BUN 14 10/31/2019    CO2 25 10/31/2019    TSH 3.300 11/27/2017    PSA 0.57 10/31/2019    INR 1.0 10/19/2011     No results found for this visit on 01/21/21. A&P   Diagnosis Orders   1. Essential hypertension  amLODIPine (NORVASC) 10 MG tablet    metoprolol tartrate (LOPRESSOR) 25 MG tablet    CBC    Comprehensive Metabolic Panel    Lipid Panel   2. Acute cystitis without hematuria  POCT Urinalysis no Micro    ciprofloxacin (CIPRO) 500 MG tablet    Culture, Urine   3. Screening PSA (prostate specific antigen)  Psa screening   4.  Needs flu shot  INFLUENZA, QUADV, 3 YRS AND OLDER, IM, MDV, 0.5ML (AFLURIA QUADV)       Will monitor bp at home    Fasting labs    Still smoking unfortunately     Flu shot            Lu Albright MD

## 2021-01-23 LAB — URINE CULTURE, ROUTINE: NORMAL

## 2021-01-26 DIAGNOSIS — Z12.5 SCREENING PSA (PROSTATE SPECIFIC ANTIGEN): ICD-10-CM

## 2021-01-26 DIAGNOSIS — I10 ESSENTIAL HYPERTENSION: ICD-10-CM

## 2021-01-26 LAB
ALBUMIN SERPL-MCNC: 3.9 G/DL (ref 3.5–4.6)
ALP BLD-CCNC: 78 U/L (ref 35–104)
ALT SERPL-CCNC: 27 U/L (ref 0–41)
ANION GAP SERPL CALCULATED.3IONS-SCNC: 15 MEQ/L (ref 9–15)
AST SERPL-CCNC: <5 U/L (ref 0–40)
BILIRUB SERPL-MCNC: <0.2 MG/DL (ref 0.2–0.7)
BUN BLDV-MCNC: 14 MG/DL (ref 6–20)
CALCIUM SERPL-MCNC: 9.5 MG/DL (ref 8.5–9.9)
CHLORIDE BLD-SCNC: 100 MEQ/L (ref 95–107)
CHOLESTEROL, TOTAL: 201 MG/DL (ref 0–199)
CO2: 21 MEQ/L (ref 20–31)
CREAT SERPL-MCNC: 0.82 MG/DL (ref 0.7–1.2)
GFR AFRICAN AMERICAN: >60
GFR NON-AFRICAN AMERICAN: >60
GLOBULIN: 3.2 G/DL (ref 2.3–3.5)
GLUCOSE BLD-MCNC: 99 MG/DL (ref 70–99)
HCT VFR BLD CALC: 50.5 % (ref 42–52)
HDLC SERPL-MCNC: 27 MG/DL (ref 40–59)
HEMOGLOBIN: 16.6 G/DL (ref 14–18)
LDL CHOLESTEROL CALCULATED: 140 MG/DL (ref 0–129)
MCH RBC QN AUTO: 30.6 PG (ref 27–31.3)
MCHC RBC AUTO-ENTMCNC: 32.8 % (ref 33–37)
MCV RBC AUTO: 93.1 FL (ref 80–100)
PDW BLD-RTO: 14.3 % (ref 11.5–14.5)
PLATELET # BLD: 233 K/UL (ref 130–400)
POTASSIUM SERPL-SCNC: 4.4 MEQ/L (ref 3.4–4.9)
PROSTATE SPECIFIC ANTIGEN: 0.59 NG/ML (ref 0–3.89)
RBC # BLD: 5.42 M/UL (ref 4.7–6.1)
SODIUM BLD-SCNC: 136 MEQ/L (ref 135–144)
TOTAL PROTEIN: 7.1 G/DL (ref 6.3–8)
TRIGL SERPL-MCNC: 171 MG/DL (ref 0–150)
WBC # BLD: 9.5 K/UL (ref 4.8–10.8)

## 2021-07-22 ENCOUNTER — OFFICE VISIT (OUTPATIENT)
Dept: FAMILY MEDICINE CLINIC | Age: 61
End: 2021-07-22
Payer: MEDICARE

## 2021-07-22 VITALS
WEIGHT: 188.4 LBS | SYSTOLIC BLOOD PRESSURE: 128 MMHG | BODY MASS INDEX: 26.97 KG/M2 | DIASTOLIC BLOOD PRESSURE: 68 MMHG | HEART RATE: 93 BPM | HEIGHT: 70 IN | OXYGEN SATURATION: 96 % | TEMPERATURE: 97.9 F

## 2021-07-22 DIAGNOSIS — I10 ESSENTIAL HYPERTENSION: Primary | ICD-10-CM

## 2021-07-22 PROCEDURE — 99213 OFFICE O/P EST LOW 20 MIN: CPT | Performed by: FAMILY MEDICINE

## 2021-07-22 RX ORDER — LORATADINE 10 MG/1
10 CAPSULE, LIQUID FILLED ORAL DAILY
COMMUNITY
End: 2021-11-12

## 2021-07-22 SDOH — ECONOMIC STABILITY: FOOD INSECURITY: WITHIN THE PAST 12 MONTHS, THE FOOD YOU BOUGHT JUST DIDN'T LAST AND YOU DIDN'T HAVE MONEY TO GET MORE.: NEVER TRUE

## 2021-07-22 SDOH — ECONOMIC STABILITY: FOOD INSECURITY: WITHIN THE PAST 12 MONTHS, YOU WORRIED THAT YOUR FOOD WOULD RUN OUT BEFORE YOU GOT MONEY TO BUY MORE.: NEVER TRUE

## 2021-07-22 ASSESSMENT — SOCIAL DETERMINANTS OF HEALTH (SDOH): HOW HARD IS IT FOR YOU TO PAY FOR THE VERY BASICS LIKE FOOD, HOUSING, MEDICAL CARE, AND HEATING?: NOT VERY HARD

## 2021-07-22 NOTE — PROGRESS NOTES
Chief Complaint   Patient presents with    Hypertension     pt states left leg feels a stabbing feeling in leg for past two days        HPI:  Milan Pepe is a 63 y.o. male    Checkup/follow up  Lopressor/norvasc for HTN    Left leg pain for two days      Known BPH history  Was on flomax  Has not had f/u with urology in a while    Feeling a little foggy/fatigued    States he has had them before    Actually had workup for hematuria in past as well    History of \"nerve damage\"    Pain in lateral leg, sharp    Still smokes cigarettes and marijuana     Past Medical History:   Diagnosis Date    Abnormal finding on EKG 7/11/2017    HTN (hypertension) 1/13/2017     Past Surgical History:   Procedure Laterality Date    CYSTOSCOPY  06/07/2017    [facility] MD  BILATERAL RETROGRADE PYELOGRAMS BLADDER BX FULGURATION    LEG SURGERY Bilateral     screws and plates in both after broken      History reviewed. No pertinent family history. Social History     Socioeconomic History    Marital status: Single     Spouse name: None    Number of children: None    Years of education: None    Highest education level: None   Occupational History    None   Tobacco Use    Smoking status: Current Every Day Smoker     Packs/day: 1.00     Years: 20.00     Pack years: 20.00    Smokeless tobacco: Never Used   Substance and Sexual Activity    Alcohol use:  Yes     Alcohol/week: 5.0 standard drinks     Types: 5 Shots of liquor per week     Comment: socially    Drug use: Yes     Frequency: 1.0 times per week     Types: Marijuana     Comment: daily    Sexual activity: None   Other Topics Concern    None   Social History Narrative    None     Social Determinants of Health     Financial Resource Strain: Low Risk     Difficulty of Paying Living Expenses: Not very hard   Food Insecurity: No Food Insecurity    Worried About Running Out of Food in the Last Year: Never true    Ching of Food in the Last Year: Never true   Transportation Needs:     Lack of Transportation (Medical):  Lack of Transportation (Non-Medical):    Physical Activity:     Days of Exercise per Week:     Minutes of Exercise per Session:    Stress:     Feeling of Stress :    Social Connections:     Frequency of Communication with Friends and Family:     Frequency of Social Gatherings with Friends and Family:     Attends Mormon Services:     Active Member of Clubs or Organizations:     Attends Club or Organization Meetings:     Marital Status:    Intimate Partner Violence:     Fear of Current or Ex-Partner:     Emotionally Abused:     Physically Abused:     Sexually Abused:      Current Outpatient Medications   Medication Sig Dispense Refill    loratadine (CLARITIN) 10 MG capsule Take 10 mg by mouth daily      amLODIPine (NORVASC) 10 MG tablet take 1 tablet by mouth once daily 90 tablet 3    metoprolol tartrate (LOPRESSOR) 25 MG tablet take 1 tablet by mouth twice a day 180 tablet 3    aspirin 81 MG tablet Take 81 mg by mouth daily       No current facility-administered medications for this visit. Allergies   Allergen Reactions    Statins     Proscar [Finasteride] Hives and Swelling       Review of Systems:   General ROS: some fatigue  Respiratory ROS: no cough, shortness of breath, or wheezing  Cardiovascular ROS: no chest pain or dyspnea on exertion  Gastrointestinal ROS: no abdominal pain, change in bowel habits, or black or bloody stools  Genito-Urinary ROS:dysuria  Musculoskeletal ROS: walks with cane, hip pain, history of bilateral leg fractures  Neurological ROS: reports chronic balance difficulty    In general patient otherwise reports feeling well.      Physical Exam:  /68 (Site: Right Upper Arm)   Pulse 93   Temp 97.9 °F (36.6 °C)   Ht 5' 10\" (1.778 m)   Wt 188 lb 6.4 oz (85.5 kg)   SpO2 96%   BMI 27.03 kg/m²     Gen: Well, NAD, Alert, Oriented x 3   HEENT: EOMI, eyes clear, MMM, poor dentition  Skin: without rash or jaundice  Neck: no significant lymphadenopathy or thyromegaly  Lungs: CTA B w/out Rales/Wheezes/Rhonchi, Good respiratory effort   Heart: RRR, S1S2, w/out M/R/G, non-displaced PMI   Abdomen: Soft NT/ND, w/out R/G, w/ +BSx4   Ext: No C/C/E Bilaterally. Neuro: Neurovascularly intact w/ Sensory/Motor intact UE/LE Bilaterally. Lab Results   Component Value Date    WBC 9.5 01/26/2021    HGB 16.6 01/26/2021    HCT 50.5 01/26/2021     01/26/2021    CHOL 201 (H) 01/26/2021    TRIG 171 (H) 01/26/2021    HDL 27 (L) 01/26/2021    ALT 27 01/26/2021    AST <5 01/26/2021     01/26/2021    K 4.4 01/26/2021     01/26/2021    CREATININE 0.82 01/26/2021    BUN 14 01/26/2021    CO2 21 01/26/2021    TSH 3.300 11/27/2017    PSA 0.59 01/26/2021    INR 1.0 10/19/2011     No results found for this visit on 07/22/21. A&P   Diagnosis Orders   1.  Essential hypertension         Will monitor bp at home    Still smoking unfortunately     Continue with current medications          Yrn Hurt MD

## 2021-08-20 ENCOUNTER — TELEPHONE (OUTPATIENT)
Dept: FAMILY MEDICINE CLINIC | Age: 61
End: 2021-08-20

## 2021-08-20 DIAGNOSIS — N30.00 ACUTE CYSTITIS WITHOUT HEMATURIA: ICD-10-CM

## 2021-08-20 RX ORDER — CIPROFLOXACIN 500 MG/1
500 TABLET, FILM COATED ORAL 2 TIMES DAILY
Qty: 20 TABLET | Refills: 0 | Status: SHIPPED | OUTPATIENT
Start: 2021-08-20 | End: 2021-08-30

## 2021-08-20 NOTE — TELEPHONE ENCOUNTER
Pt calling states that he has frequent UTIs. Last visit for this was Jan. Pt notes that he does not see the point in coming in before treatment. Wants to com in after!!! I explained to pt that we do not typically treat over the phone. It is important to evaluate before treating. Pt does not want to anita? [facility]. Please have MA call to confirm treatment or RC?

## 2021-10-08 ENCOUNTER — TELEPHONE (OUTPATIENT)
Dept: FAMILY MEDICINE CLINIC | Age: 61
End: 2021-10-08

## 2021-10-08 NOTE — TELEPHONE ENCOUNTER
Left message for patient that they are due for their Medicare Annual Wellness exam.  Please give the office a call to schedule.

## 2021-10-11 ENCOUNTER — TELEPHONE (OUTPATIENT)
Dept: FAMILY MEDICINE CLINIC | Age: 61
End: 2021-10-11

## 2021-11-12 ENCOUNTER — OFFICE VISIT (OUTPATIENT)
Dept: FAMILY MEDICINE CLINIC | Age: 61
End: 2021-11-12
Payer: MEDICARE

## 2021-11-12 VITALS
DIASTOLIC BLOOD PRESSURE: 80 MMHG | HEART RATE: 83 BPM | OXYGEN SATURATION: 97 % | SYSTOLIC BLOOD PRESSURE: 136 MMHG | BODY MASS INDEX: 26.34 KG/M2 | WEIGHT: 184 LBS | HEIGHT: 70 IN

## 2021-11-12 DIAGNOSIS — Z00.00 ROUTINE GENERAL MEDICAL EXAMINATION AT A HEALTH CARE FACILITY: Primary | ICD-10-CM

## 2021-11-12 DIAGNOSIS — L98.9 CHANGING SKIN LESION: ICD-10-CM

## 2021-11-12 DIAGNOSIS — I10 ESSENTIAL HYPERTENSION: ICD-10-CM

## 2021-11-12 DIAGNOSIS — Z23 NEED FOR INFLUENZA VACCINATION: ICD-10-CM

## 2021-11-12 PROCEDURE — G0008 ADMIN INFLUENZA VIRUS VAC: HCPCS | Performed by: FAMILY MEDICINE

## 2021-11-12 PROCEDURE — 90674 CCIIV4 VAC NO PRSV 0.5 ML IM: CPT | Performed by: FAMILY MEDICINE

## 2021-11-12 PROCEDURE — G0439 PPPS, SUBSEQ VISIT: HCPCS | Performed by: FAMILY MEDICINE

## 2021-11-12 RX ORDER — AMLODIPINE BESYLATE 10 MG/1
TABLET ORAL
Qty: 90 TABLET | Refills: 3 | Status: SHIPPED | OUTPATIENT
Start: 2021-11-12

## 2021-11-12 ASSESSMENT — PATIENT HEALTH QUESTIONNAIRE - PHQ9
1. LITTLE INTEREST OR PLEASURE IN DOING THINGS: 0
SUM OF ALL RESPONSES TO PHQ QUESTIONS 1-9: 0
2. FEELING DOWN, DEPRESSED OR HOPELESS: 0
SUM OF ALL RESPONSES TO PHQ QUESTIONS 1-9: 0
SUM OF ALL RESPONSES TO PHQ QUESTIONS 1-9: 0
SUM OF ALL RESPONSES TO PHQ9 QUESTIONS 1 & 2: 0

## 2021-11-12 NOTE — PROGRESS NOTES
(83.5 kg)   Height: 5' 10\" (1.778 m)     Body mass index is 26.4 kg/m². Based upon direct observation of the patient, evaluation of cognition reveals recent and remote memory intact. Physical Exam:  /80   Pulse 83   Ht 5' 10\" (1.778 m)   Wt 184 lb (83.5 kg)   SpO2 97%   BMI 26.40 kg/m²     Gen: Well, NAD, Alert, Oriented x 3   HEENT: EOMI, eyes clear, MMM  Skin: see media, 1cm lesion on chest, suspicious for BCC  Neck: no significant lymphadenopathy or thyromegaly  Lungs: CTA B w/out Rales/Wheezes/Rhonchi, Good respiratory effort   Heart: RRR, S1S2, w/out M/R/G, non-displaced PMI   Ext: No C/C/E Bilaterally. Neuro: Neurovascularly intact w/ Sensory/Motor intact UE/LE Bilaterally. Patient's complete Health Risk Assessment and screening values have been reviewed and are found in Flowsheets. The following problems were reviewed today and where indicated follow up appointments were made and/or referrals ordered. Positive Risk Factor Screenings with Interventions:         Substance History:  Social History     Tobacco History     Smoking Status  Current Every Day Smoker Smoking Frequency  1 pack/day for 20 years (20 pk yrs)    Smokeless Tobacco Use  Never Used          Alcohol History     Alcohol Use Status  Yes Drinks/Week  5 Shots of liquor per week Amount  5.0 standard drinks of alcohol/wk Comment  socially          Drug Use     Drug Use Status  Yes Types  Marijuana (Weed) Frequency   1 time/week Comment  daily          Sexual Activity     Sexually Active  Not Asked               Alcohol Screening:       A score of 8 or more is associated with harmful or hazardous drinking. A score of 13 or more in women, and 15 or more in men, is likely to indicate alcohol dependence. Substance Abuse Interventions:  · Encouraged to quit smoking    General Health and ACP:  General  In general, how would you say your health is?: Good  In the past 7 days, have you experienced any of the following?  New or Increased Pain, New or Increased Fatigue, Loneliness, Social Isolation, Stress or Anger?: None of These  Do you get the social and emotional support that you need?: Yes  Do you have a Living Will?: (!) No  Advance Directives     Power of Greyson Hensley Will ACP-Advance Directive ACP-Power of     Not on File Not on File Not on File Not on File      General Health Risk Interventions:  · No Living Will: Advance Care Planning addressed with patient today    Health Habits/Nutrition:  Health Habits/Nutrition  Do you exercise for at least 20 minutes 2-3 times per week?: (!) No  Have you lost any weight without trying in the past 3 months?: No  Do you eat only one meal per day?: No  Have you seen the dentist within the past year?: Yes  Body mass index: (!) 26.4  Health Habits/Nutrition Interventions:  · Inadequate physical activity:  educational materials provided to promote increased physical activity    Hearing/Vision:  No exam data present  Hearing/Vision  Do you or your family notice any trouble with your hearing that hasn't been managed with hearing aids?: No  Do you have difficulty driving, watching TV, or doing any of your daily activities because of your eyesight?: No  Have you had an eye exam within the past year?: (!) No  Hearing/Vision Interventions:  · routine eye exam suggested     Safety:  Safety  Do you have working smoke detectors?: Yes  Have all throw rugs been removed or fastened?: Yes  Do you have non-slip mats or surfaces in all bathtubs/showers?: Yes  Do all of your stairways have a railing or banister?: (!) No  Are your doorways, halls and stairs free of clutter?: Yes  Do you always fasten your seatbelt when you are in a car?: Yes  Safety Interventions:  · Home safety tips provided     Personalized Preventive Plan   Current Health Maintenance Status  Immunization History   Administered Date(s) Administered    COVID-19, Valentino Flattery, Primary or Immunocompromised, PF, 100mcg/0.5mL 06/23/2021    Influenza Virus Vaccine 01/27/2017, 11/17/2017, 11/19/2018    Influenza, Quadv, 6 mo and older, IM (Fluzone, Flulaval) 11/17/2017    Influenza, Scherry Craze, IM, (6 mo and older Fluzone, Flulaval, Fluarix and 3 yrs and older Afluria) 01/27/2017, 11/19/2018, 01/21/2021    Influenza, Quadv, IM, PF (6 mo and older Fluzone, Flulaval, Fluarix, and 3 yrs and older Afluria) 10/28/2019    Pneumococcal Polysaccharide ([id number]) 01/27/2017        Health Maintenance   Topic Date Due    DTaP/Tdap/Td vaccine (1 - Tdap) Never done    Shingles Vaccine (1 of 2) Never done    Low dose CT lung screening  Never done   [employer] Visit (AWV)  06/20/2021    Flu vaccine (1) 09/01/2021    COVID-19 Vaccine (3 - Booster for Moderna series) 01/21/2022    Colon cancer screen colonoscopy  01/27/2023    Lipid screen  01/26/2026    Pneumococcal 0-64 years Vaccine (2 of 2 - PPSV23) 02/07/2027    Hepatitis C screen  Completed    HIV screen  Completed    Hepatitis A vaccine  Aged Out    Hepatitis B vaccine  Aged Out    Hib vaccine  Aged Out    Meningococcal (ACWY) vaccine  Aged Out     Recommendations for [employer] Due: see orders and patient instructions/AVS.  . Recommended screening schedule for the next 5-10 years is provided to the patient in written form: see Patient Instructions/AVS.    Aliyah Noel was seen today for medicare awv and immunizations.     Diagnoses and all orders for this visit:    Routine general medical examination at a health care facility    Need for influenza vaccination  -     INFLUENZA, MDCK QUADV, 2 YRS AND OLDER, IM, PF, PREFILL SYR OR SDV, 0.5ML (FLUCELVAX QUADV, PF)    Essential hypertension  -     amLODIPine (NORVASC) 10 MG tablet; take 1 tablet by mouth once daily  -     metoprolol tartrate (LOPRESSOR) 25 MG tablet; take 1 tablet by mouth twice a day    Changing skin lesion  -     Elissa Brown MD, Dermatology, [facility] referral     Chronic conditions are stable  Continue current regimen  Follow up with appropriate specialists and here routinely for ongoing monitoring of chronic conditions      Libby Nolasco MD

## 2021-11-12 NOTE — PATIENT INSTRUCTIONS
Personalized Preventive Plan for Nils Enrique - 11/12/2021  Medicare offers a range of preventive health benefits. Some of the tests and screenings are paid in full while other may be subject to a deductible, co-insurance, and/or copay. Some of these benefits include a comprehensive review of your medical history including lifestyle, illnesses that may run in your family, and various assessments and screenings as appropriate. After reviewing your medical record and screening and assessments performed today your provider may have ordered immunizations, labs, imaging, and/or referrals for you. A list of these orders (if applicable) as well as your Preventive Care list are included within your After Visit Summary for your review. Other Preventive Recommendations:    · A preventive eye exam performed by an eye specialist is recommended every 1-2 years to screen for glaucoma; cataracts, macular degeneration, and other eye disorders. · A preventive dental visit is recommended every 6 months. · Try to get at least 150 minutes of exercise per week or 10,000 steps per day on a pedometer . · Order or download the FREE \"Exercise & Physical Activity: Your Everyday Guide\" from [employer]. Call 7-916.853.8473 or search [employer] online. · You need 9561-2683 mg of calcium and 5047-8343 IU of vitamin D per day. It is possible to meet your calcium requirement with diet alone, but a vitamin D supplement is usually necessary to meet this goal.  · When exposed to the sun, use a sunscreen that protects against both UVA and UVB radiation with an SPF of 30 or greater. Reapply every 2 to 3 hours or after sweating, drying off with a towel, or swimming. · Always wear a seat belt when traveling in a car. Always wear a helmet when riding a bicycle or motorcycle.

## 2021-12-02 ENCOUNTER — OFFICE VISIT (OUTPATIENT)
Dept: FAMILY MEDICINE CLINIC | Age: 61
End: 2021-12-02
Payer: MEDICARE

## 2021-12-02 VITALS — BODY MASS INDEX: 26.34 KG/M2 | TEMPERATURE: 97.5 F | WEIGHT: 184 LBS | HEIGHT: 70 IN

## 2021-12-02 DIAGNOSIS — D49.2 ABNORMAL SKIN GROWTH: Primary | ICD-10-CM

## 2021-12-02 PROCEDURE — 11602 EXC TR-EXT MAL+MARG 1.1-2 CM: CPT | Performed by: FAMILY MEDICINE

## 2021-12-02 PROCEDURE — 99214 OFFICE O/P EST MOD 30 MIN: CPT | Performed by: FAMILY MEDICINE

## 2021-12-02 ASSESSMENT — ENCOUNTER SYMPTOMS: COLOR CHANGE: 1

## 2021-12-02 NOTE — PROGRESS NOTES
Diagnosis Orders   1. Abnormal skin growth  Specimen to Pathology Outpatient    [facility] EXC SKIN TYRONIG 1.1-2CM [facility]     Return for 7 to 10-day OV suture removal 15-minute. Patient is here for abnormal skin lesion. He says he has had the lesion for quite a long time but recently he noted to be bleeding and opening spontaneously. Denies treatments. States it does itch. Current Outpatient Medications on File Prior to Visit   Medication Sig Dispense Refill    amLODIPine (NORVASC) 10 MG tablet take 1 tablet by mouth once daily 90 tablet 3    metoprolol tartrate (LOPRESSOR) 25 MG tablet take 1 tablet by mouth twice a day 180 tablet 3     No current facility-administered medications on file prior to visit. Statins and Proscar [finasteride]    Review of Systems   Constitutional: Negative for chills and fever. Skin: Positive for color change. Allergic/Immunologic: Negative for environmental allergies, food allergies and immunocompromised state. Hematological: Negative for adenopathy. Does not bruise/bleed easily. Psychiatric/Behavioral: Negative for behavioral problems and sleep disturbance. Temp 97.5 °F (36.4 °C)   Ht 5' 10\" (1.778 m)   Wt 184 lb (83.5 kg)   BMI 26.40 kg/m²   Physical Exam  Constitutional:       General: He is not in acute distress. Appearance: Normal appearance. He is well-developed. He is not toxic-appearing. HENT:      Head: Normocephalic and atraumatic. Right Ear: Hearing and tympanic membrane normal.      Left Ear: Hearing and tympanic membrane normal.      Nose: Nose normal. No nasal deformity. Eyes:      General: Lids are normal.         Right eye: No discharge. Left eye: No discharge. Conjunctiva/sclera: Conjunctivae normal.      Pupils: Pupils are equal, round, and reactive to light. Neck:      Thyroid: No thyroid mass or thyromegaly. Vascular: No JVD.       Trachea: Trachea and phonation normal.   Cardiovascular:      Rate and 12/2/2022     Order Specific Question:   PREVIOUS BIOPSY     Answer:   No     Order Specific Question:   PREOP DIAGNOSIS     Answer:   Abnormal skin growth nonhealing wound     Order Specific Question:   FROZEN SECTION - NO OR YES/SPECIMEN     Answer:   No    [place] EXC SKIN BENIG 1.1-2CM TRUNK,ARM,LEG     Central anterior chest     No orders of the defined types were placed in this encounter. Patient Instructions   Incision/Laceration repair    -Clean surgical area with antibacterial soap and water once daily. --You may be instructed to soak the wound with Hydrogen Peroxide to loosen scabbing around sutures, this is not to be done more often that every 3 days, should be for 30 seconds-1 min and then rinsed off with water.     -Keep surgical site moist with vaseline or antibiotic ointment (single, not tripleantibiotic or Neosporin) and apply a fresh bandage daily until a solid scab forms or if the wound is at risk for trauma or dirt.     -Follow up immediately if any growing redness (minimal redness or pale pink is normal along wound edges) surrounds the surgical site or if dripping drainage occurs at surgical site. Once a solid scab forms no more bandage needed. A wet scab can look yellow. This is not infection, just moisture.  -Once the lesion is healed be sure to apply sunscreen to the area to prevent burn of the newer and more delicate skin during the first 6 months of healing.    -If the scar begins to be raised you may massage the area firmly twice a day to help break down scar tissue and help the area become a flat scar. There is some evidence that Mederma applied to a scar daily for the first few months can help shrink and fade it more quickly then without intervention.

## 2021-12-03 DIAGNOSIS — D49.2 ABNORMAL SKIN GROWTH: ICD-10-CM

## 2021-12-08 DIAGNOSIS — C44.91 BASAL CELL CARCINOMA (BCC), UNSPECIFIED SITE: Primary | ICD-10-CM

## 2021-12-09 ENCOUNTER — PROCEDURE VISIT (OUTPATIENT)
Dept: FAMILY MEDICINE CLINIC | Age: 61
End: 2021-12-09
Payer: MEDICARE

## 2021-12-09 ENCOUNTER — TELEPHONE (OUTPATIENT)
Dept: FAMILY MEDICINE CLINIC | Age: 61
End: 2021-12-09

## 2021-12-09 VITALS — HEIGHT: 70 IN | BODY MASS INDEX: 26.34 KG/M2 | WEIGHT: 184 LBS | TEMPERATURE: 97.6 F

## 2021-12-09 DIAGNOSIS — C44.91 BASAL CELL CARCINOMA (BCC), UNSPECIFIED SITE: Primary | ICD-10-CM

## 2021-12-09 PROCEDURE — 11602 EXC TR-EXT MAL+MARG 1.1-2 CM: CPT | Performed by: FAMILY MEDICINE

## 2021-12-09 NOTE — PATIENT INSTRUCTIONS
Incision/Laceration repair    -Clean surgical area with antibacterial soap and water once daily. --You may be instructed to soak the wound with Hydrogen Peroxide to loosen scabbing around sutures, this is not to be done more often that every 3 days, should be for 30 seconds-1 min and then rinsed off with water.     -Keep surgical site moist with vaseline or antibiotic ointment (single, not tripleantibiotic or Neosporin) and apply a fresh bandage daily until a solid scab forms or if the wound is at risk for trauma or dirt.     -Follow up immediately if any growing redness (minimal redness or pale pink is normal along wound edges) surrounds the surgical site or if dripping drainage occurs at surgical site. Once a solid scab forms no more bandage needed. A wet scab can look yellow. This is not infection, just moisture.  -Once the lesion is healed be sure to apply sunscreen to the area to prevent burn of the newer and more delicate skin during the first 6 months of healing.    -If the scar begins to be raised you may massage the area firmly twice a day to help break down scar tissue and help the area become a flat scar. There is some evidence that Mederma applied to a scar daily for the first few months can help shrink and fade it more quickly then without intervention. Patient Education        Learning About Basal Cell Skin Cancer  Your Care Instructions     Basal cell skin cancer (carcinoma) is a type of skin cancer. It most often appears on areas of the body that have been exposed to the sun. These areas include the head, face, neck, back, chest, or shoulders. The nose is the most common site. This cancer grows slowly and does not usually spread, or metastasize, to other parts of the body. It is almost always cured when it is found early and treated. This skin cancer is usually caused by too much sun. Using tanning beds or sunlamps can also cause it. What are the symptoms?   Signs of basal cell sunscreen that has a sun protection factor (SPF) of 30 or higher. Use it every day, even when it is cloudy. · Do not use tanning booths or sunlamps. · Use lip balm or cream that has sun protection factor (SPF) to protect your lips from getting sunburned. · Wear sunglasses that block UV rays. When should you call for help? Watch closely for changes in your health, and be sure to contact your doctor if:  · You see a change in your skin, such as a growth or mole that:  ? Grows bigger. This may happen slowly. ? Changes color. ? Changes shape. ? Starts to bleed easily. Follow-up care is a key part of your treatment and safety. Be sure to make and go to all appointments, and call your doctor if you are having problems. It's also a good idea to know your test results and keep a list of the medicines you take. Where can you learn more? Go to [email or web address]://[email or web address]. org and sign in to your iCreate account. Enter [phone] in the [facility] box to learn more about \"Learning About Basal Cell Skin Cancer. \"     If you do not have an account, please click on the \"Sign Up Now\" link. Current as of: December 17, 2020               Content Version: 13.0  © 4851-1251 [employer]. Care instructions adapted under license by [facility] ([facility]). If you have questions about a medical condition or this instruction, always ask your healthcare professional. [place] [address] any warranty or liability for your use of this information.

## 2021-12-09 NOTE — PROGRESS NOTES
Diagnosis Orders   1. Basal cell carcinoma (BCC), unspecified site  [address]352 - [place] EXC SKIN MALIG 1.1-2CM TRUNK,ARM,LEG     Return in about 2 weeks (around 12/23/2021) for suture removaal.    Pt is here for revision of chest lesion with positive deep margin for BCC. Current Outpatient Medications on File Prior to Visit   Medication Sig Dispense Refill    amLODIPine (NORVASC) 10 MG tablet take 1 tablet by mouth once daily 90 tablet 3    metoprolol tartrate (LOPRESSOR) 25 MG tablet take 1 tablet by mouth twice a day 180 tablet 3     No current facility-administered medications on file prior to visit. Statins and Proscar [finasteride]    Review of Systems   Constitutional: Negative for chills and fever. Skin: Positive for wound. Allergic/Immunologic: Negative for environmental allergies, food allergies and immunocompromised state. Hematological: Negative for adenopathy. Does not bruise/bleed easily. Psychiatric/Behavioral: Negative for behavioral problems and sleep disturbance. Temp 97.6 °F (36.4 °C)   Ht 5' 10\" (1.778 m)   Wt 184 lb (83.5 kg)   BMI 26.40 kg/m²   Physical Exam  Constitutional:       General: He is not in acute distress. Appearance: Normal appearance. He is well-developed. He is not toxic-appearing. HENT:      Head: Normocephalic and atraumatic. Right Ear: Hearing and tympanic membrane normal.      Left Ear: Hearing and tympanic membrane normal.      Nose: Nose normal. No nasal deformity. Eyes:      General: Lids are normal.         Right eye: No discharge. Left eye: No discharge. Conjunctiva/sclera: Conjunctivae normal.      Pupils: Pupils are equal, round, and reactive to light. Neck:      Thyroid: No thyroid mass or thyromegaly. Vascular: No JVD. Trachea: Trachea and phonation normal.   Cardiovascular:      Rate and Rhythm: Normal rate and regular rhythm. Pulmonary:      Effort: No accessory muscle usage or respiratory distress. Musculoskeletal:      Cervical back: Full passive range of motion without pain. Comments: FROM all large muscle groups and joints as witnessed when walking to exam table, getting on, and getting off the exam table. Skin:     General: Skin is warm and dry. Findings: No rash. Comments: sutures from prior wound removed. Dehiscence noted with suture removal.   Neurological:      Mental Status: He is alert. Motor: No tremor or atrophy. Gait: Gait normal.   Psychiatric:         Speech: Speech normal.         Behavior: Behavior normal.         Thought Content: Thought content normal.                   CONSENT:  The procedure was discussed with the patient. All questions were answered and alternative options discussed. The patient is aware of the risks of bleeding, infection,unsatisfactory scar result. Informed consent paperwork was signed by the patient. PROCEDURE: central chest BCC revision (original lesion with positive margins was 1 cm)  The lesion area was prepped with alcohol wipes and 6 cc of 1% Lidocaine with epi used for anesthesia. A 15 blade scalpel was used to debride and create fresh edges of the wound. Some fat tissue was removed to create mobility for closure. Center of the deep portion of the wound is sent for pathology to confirm cancer clearance. The lesion size is noted in physical exam and the excision made to allow 2 mm non-lesion skin border. Hemostasis was achieved with 6.0 Vicryl x4 internal buried sutures, 3.0x3 Ethilon mattress sutures, and 17x5 point 0 Ethilon interrupted sutures  EBL < 1cc. Bacitracin and a bandage were placed over the wound. ASSESSMENT AND PLAN:   Diagnosis Orders   1.  Basal cell carcinoma (BCC), unspecified site  36035 - [place] EXC SKIN MALIG 1.1-2CM TRUNK,ARM,LEG       Return in about 2 weeks (around 12/23/2021) for suture removaal.      DO NOT USE TRIPLE ANTIBIOTIC OINTMENT    Orders Placed This Encounter   Procedures    [address]6481 - [place] EXC SKIN MALIG 1.1-2CM TRUNK,ARM,LEG     No orders of the defined types were placed in this encounter. Patient Instructions   Incision/Laceration repair    -Clean surgical area with antibacterial soap and water once daily. --You may be instructed to soak the wound with Hydrogen Peroxide to loosen scabbing around sutures, this is not to be done more often that every 3 days, should be for 30 seconds-1 min and then rinsed off with water.     -Keep surgical site moist with vaseline or antibiotic ointment (single, not tripleantibiotic or Neosporin) and apply a fresh bandage daily until a solid scab forms or if the wound is at risk for trauma or dirt.     -Follow up immediately if any growing redness (minimal redness or pale pink is normal along wound edges) surrounds the surgical site or if dripping drainage occurs at surgical site. Once a solid scab forms no more bandage needed. A wet scab can look yellow. This is not infection, just moisture.  -Once the lesion is healed be sure to apply sunscreen to the area to prevent burn of the newer and more delicate skin during the first 6 months of healing.    -If the scar begins to be raised you may massage the area firmly twice a day to help break down scar tissue and help the area become a flat scar. There is some evidence that Mederma applied to a scar daily for the first few months can help shrink and fade it more quickly then without intervention. Patient Education        Learning About Basal Cell Skin Cancer  Your Care Instructions     Basal cell skin cancer (carcinoma) is a type of skin cancer. It most often appears on areas of the body that have been exposed to the sun. These areas include the head, face, neck, back, chest, or shoulders. The nose is the most common site. This cancer grows slowly and does not usually spread, or metastasize, to other parts of the body. It is almost always cured when it is found early and treated.   This skin cancer is usually caused by too much sun. Using tanning beds or sunlamps can also cause it. What are the symptoms? Signs of basal cell carcinoma include:  · Any firm, pearly bump with tiny blood vessels that look spidery. · Any red, tender, flat spot that bleeds easily. · Any small, fleshy bump with a smooth, pearly appearance. It may have a sunken center. · Any smooth, shiny bump that may look like a mole or cyst.  · Any patch of skin, especially on the face, that looks like a scar and is firm to the touch. · Any bump that itches, bleeds, crusts over, and then repeats the cycle and has not healed in a few weeks. · Any change in the size, shape, or color of a mole or a skin growth. How is it treated? Your doctor will want to remove all of the cancer. There are several ways to remove it. It depends on how big it is, where it is on your body, and your age and overall health. Treatment options include:  · Surgery to cut out the cancer. · Mohs micrographic surgery. This surgery removes the skin cancer one layer at a time, checking each layer for cancer cells right after it is removed. · Curettage and electrosurgery. Curettage uses a spoon-shaped instrument (curette) to scrape off the skin cancer, and electrosurgery controls the bleeding and destroys any remaining cancer cells. · Cryosurgery. Cryosurgery destroys the skin cancer by freezing it with liquid nitrogen. · Radiation therapy. Radiation therapy uses X-rays or other types of radiation to kill cancer cells. It may be done if surgery isn't an option. Other treatment options include chemotherapy cream and photodynamic therapy. If your doctor removes the cancer, he or she will send it to a lab. The lab makes sure it is a basal cell cancer and that it all was removed. If cancer is still there, you may need more treatment. How can you prevent it? · Always wear a wide-brimmed hat and long sleeves and pants when you are outdoors.   · Avoid the sun between 10 a.m. and 4 p.m., which is the peak time for UV rays. · Always wear sunscreen on exposed skin. Make sure to use a broad-spectrum sunscreen that has a sun protection factor (SPF) of 30 or higher. Use it every day, even when it is cloudy. · Do not use tanning booths or sunlamps. · Use lip balm or cream that has sun protection factor (SPF) to protect your lips from getting sunburned. · Wear sunglasses that block UV rays. When should you call for help? Watch closely for changes in your health, and be sure to contact your doctor if:  · You see a change in your skin, such as a growth or mole that:  ? Grows bigger. This may happen slowly. ? Changes color. ? Changes shape. ? Starts to bleed easily. Follow-up care is a key part of your treatment and safety. Be sure to make and go to all appointments, and call your doctor if you are having problems. It's also a good idea to know your test results and keep a list of the medicines you take. Where can you learn more? Go to https://PerceptiMedpepiceweb.[employer]. org and sign in to your [employer] account. Enter [phone] in the TestCred box to learn more about \"Learning About Basal Cell Skin Cancer. \"     If you do not have an account, please click on the \"Sign Up Now\" link. Current as of: December 17, 2020               Content Version: 13.0  © 2006-2021 Healthwise, Incorporated. Care instructions adapted under license by [facility] ([facility]). If you have questions about a medical condition or this instruction, always ask your healthcare professional. [place] [address] any warranty or liability for your use of this information.

## 2021-12-09 NOTE — TELEPHONE ENCOUNTER
Kelli from [facility] calling for pathology reports, labs     Ph # [phone]  Fax # [phone]

## 2021-12-10 ENCOUNTER — TELEPHONE (OUTPATIENT)
Dept: FAMILY MEDICINE CLINIC | Age: 61
End: 2021-12-10

## 2021-12-10 DIAGNOSIS — C44.91 BASAL CELL CARCINOMA (BCC), UNSPECIFIED SITE: ICD-10-CM

## 2021-12-10 NOTE — TELEPHONE ENCOUNTER
Pt states that he is not lifting anything, and is doing ok. States that he is slightly sore but no pain meds needed. States that there has been little drainage.

## 2021-12-10 NOTE — TELEPHONE ENCOUNTER
[12:11 PM] Justin Chang  Please contact my 1030 procedure from yesterday and see how hes feeling.  Send me a prescription request for Bobbi Galan if he needs pain medicine so I can fill it if he needs it Id also like to know about any drainage or other issues   like 1

## 2021-12-15 ENCOUNTER — TELEPHONE (OUTPATIENT)
Dept: FAMILY MEDICINE CLINIC | Age: 61
End: 2021-12-15

## 2021-12-15 NOTE — TELEPHONE ENCOUNTER
Spoke to [facility] aware revision was completed due to rupture to the area after sutures were removed, are now clean margins, and no longer needs MOHS procedure.

## 2021-12-15 NOTE — TELEPHONE ENCOUNTER
[facility] are calling to confirm what pt had stated. Pt called [facility]  Stating that he does not need the mohs procedure now, that Dr. Mariely Zayas had gotten all of the skin cancer and it is gone. Please call [facility] back to confirm this.  [phone] option 3

## 2021-12-23 ENCOUNTER — OFFICE VISIT (OUTPATIENT)
Dept: FAMILY MEDICINE CLINIC | Age: 61
End: 2021-12-23

## 2021-12-23 VITALS — BODY MASS INDEX: 26.34 KG/M2 | HEIGHT: 70 IN | TEMPERATURE: 96.8 F | WEIGHT: 184 LBS

## 2021-12-23 DIAGNOSIS — C44.91 BASAL CELL CARCINOMA (BCC), UNSPECIFIED SITE: Primary | ICD-10-CM

## 2021-12-23 PROCEDURE — 99024 POSTOP FOLLOW-UP VISIT: CPT | Performed by: FAMILY MEDICINE

## 2021-12-23 NOTE — PROGRESS NOTES
Patient denies any complications from the wound. No redness no drainage. No pain. He has been more careful about his activity and has not suffered any problems with broken sutures. He is here today for removal.  We removed all the 4 sutures due to some incomplete healing in those locations.   He will return in a couple days to have those removed

## 2021-12-23 NOTE — PROGRESS NOTES
1. Abnormal skin growth  Specimen to Pathology Outpatient     [place] EXC SKIN BENIG 1.1-2CM [facility]      Return for 7 to 10-day OV suture removal 15-minute. Patient is here for abnormal skin lesion. He says he has had the lesion for quite a long time but recently he noted to be bleeding and opening spontaneously. Denies treatments. States it does itch.

## 2021-12-23 NOTE — PATIENT INSTRUCTIONS
Patient Education        Learning About Stitches and Staples Removal  When are stitches and staples removed? Your doctor will tell you when to have your stitches or staples removed, usually in 7 to 14 days. How long you'll be told to wait will depend on things like where the wound is located, how big and how deep the wound is, and what your general health is like. Do not remove the stitches on your own. Stitches on the face are usually removed within a week. But stitches and staples on other areas of the body, such as on the back or belly or over a joint, may need to stay in place longer, often a week or two. Be sure to follow your doctor's instructions. How are stitches and staples removed? It usually doesn't hurt when the doctor removes the stitches or staples. You may feel a tug as each stitch or staple is removed. · You will either be seated or lying down. · To remove stitches, the doctor will use scissors to cut each of the knots and then pull the threads out. · To remove staples, the doctor will use a tool to take out the staples one at a time. · The area may still feel tender after the stitches or staples are gone. But it should feel better within a few minutes or up to a few hours. What can you expect after stitches and staples are removed? Depending on the type and location of the cut, you will have a scar. Scars usually fade over time. Keep the area clean, but you won't need a bandage. When should you call for help? Call your doctor now or seek immediate medical care if :  · You have new pain, or your pain gets worse. · You have trouble moving the area near the scar. · You have symptoms of infection, such as:  ? Increased pain, swelling, warmth, or redness around the scar. ? Red streaks leading from the scar. ? Pus draining from the scar. ? A fever. Watch closely for changes in your health, and be sure to contact your doctor if:   · The scar opens.   · You do not get better as expected. Follow-up care is a key part of your treatment and safety. Be sure to make and go to all appointments, and call your doctor if you do not get better as expected. It's also a good idea to keep a list of the medicines you take. Where can you learn more? Go to https://chpepiceweb.BuyBox. org and sign in to your "[employer]" account. Enter Y836 in the CatchMe! box to learn more about \"Learning About Stitches and Staples Removal.\"     If you do not have an account, please click on the \"Sign Up Now\" link. Current as of: March 3, 2021               Content Version: 13.1  © 2006-2021 Healthwise, Incorporated. Care instructions adapted under license by [facility] ([facility]). If you have questions about a medical condition or this instruction, always ask your healthcare professional. [address] any warranty or liability for your use of this information.

## 2021-12-27 ENCOUNTER — OFFICE VISIT (OUTPATIENT)
Dept: FAMILY MEDICINE CLINIC | Age: 61
End: 2021-12-27

## 2021-12-27 VITALS — TEMPERATURE: 96.8 F | BODY MASS INDEX: 26.34 KG/M2 | WEIGHT: 184 LBS | HEIGHT: 70 IN

## 2021-12-27 DIAGNOSIS — C44.91 BASAL CELL CARCINOMA (BCC), UNSPECIFIED SITE: Primary | ICD-10-CM

## 2021-12-27 PROCEDURE — 99024 POSTOP FOLLOW-UP VISIT: CPT | Performed by: FAMILY MEDICINE

## 2021-12-27 NOTE — PROGRESS NOTES
Patient denies any erythema or drainage from the wound. No pain. 4 stitches are removed without any complication or dehiscence. Patient is to keep routine skin exams.   Every 6 months

## 2022-01-07 ENCOUNTER — OFFICE VISIT (OUTPATIENT)
Dept: FAMILY MEDICINE CLINIC | Age: 62
End: 2022-01-07
Payer: MEDICARE

## 2022-01-07 VITALS
OXYGEN SATURATION: 96 % | BODY MASS INDEX: 26.77 KG/M2 | TEMPERATURE: 97.5 F | HEIGHT: 70 IN | WEIGHT: 187 LBS | DIASTOLIC BLOOD PRESSURE: 78 MMHG | SYSTOLIC BLOOD PRESSURE: 120 MMHG | HEART RATE: 72 BPM

## 2022-01-07 DIAGNOSIS — K08.89 PAIN, DENTAL: ICD-10-CM

## 2022-01-07 DIAGNOSIS — K04.7 DENTAL ABSCESS: Primary | ICD-10-CM

## 2022-01-07 PROCEDURE — 99213 OFFICE O/P EST LOW 20 MIN: CPT | Performed by: NURSE PRACTITIONER

## 2022-01-07 RX ORDER — AMOXICILLIN AND CLAVULANATE POTASSIUM 875; 125 MG/1; MG/1
1 TABLET, FILM COATED ORAL 2 TIMES DAILY
Qty: 20 TABLET | Refills: 0 | Status: SHIPPED | OUTPATIENT
Start: 2022-01-07 | End: 2022-01-17

## 2022-01-07 NOTE — PATIENT INSTRUCTIONS
Patient Education        Abscessed Tooth: Care Instructions  Your Care Instructions     An abscessed tooth is a tooth that has a pocket of pus in the tissues around it. Pus forms when the body tries to fight an infection caused by bacteria. If the pus cannot drain, it forms an abscess. An abscessed tooth can cause red, swollen gums and throbbing pain, especially when you chew. You may have a bad taste in your mouth and a fever, and your jaw may swell. Damage to the tooth, untreated tooth decay, or gum disease can cause an abscessed tooth. An abscessed tooth needs to be treated by a dental professional right away. If it is not treated, the infection could spread to other parts of your body. Your dentist will give you antibiotics to stop the infection. He or she may make a hole in the tooth or cut open (alec) the abscess inside your mouth so that the infection can drain, which should relieve your pain. You may need to have a root canal treatment, which tries to save your tooth by taking out the infected pulp and replacing it with a healing medicine and/or a filling. If these treatments do not work, your tooth may have to be removed. Follow-up care is a key part of your treatment and safety. Be sure to make and go to all appointments, and call your doctor if you are having problems. It's also a good idea to know your test results and keep a list of the medicines you take. How can you care for yourself at home? · Reduce pain and swelling in your face and jaw by putting ice or a cold pack on the outside of your cheek. Do this for 10 to 20 minutes at a time. Put a thin cloth between the ice and your skin. · Take pain medicines exactly as directed. ? If the doctor gave you a prescription medicine for pain, take it as prescribed. ? If you are not taking a prescription pain medicine, ask your doctor if you can take an over-the-counter medicine. · Take antibiotics as directed.  Do not stop taking them just because you feel better. You need to take the full course of antibiotics. To prevent tooth abscess  · Brush and floss every day. Have regular dental checkups. · Eat a healthy diet. Avoid sugary foods and drinks. · Do not smoke or vape with nicotine. And don't use spit tobacco. Tobacco and nicotine slow your ability to heal. They increase your risk for gum disease and cancer of the mouth and throat. If you need help quitting, talk to your doctor about stop-smoking programs and medicines. These can increase your chances of quitting for good. When should you call for help? Call 911 anytime you think you may need emergency care. For example, call if:    · You have trouble breathing. Call your doctor now or seek immediate medical care if:    · You have new or worse symptoms of infection, such as:  ? Increased pain, swelling, warmth, or redness. ? Red streaks leading from the area. ? Pus draining from the area. ? A fever. Watch closely for changes in your health, and be sure to contact your doctor if:    · You do not get better as expected. Where can you learn more? Go to https://The Bay Lights.Fitsistant. org and sign in to your [employer] account. Enter U646 in the [facility] box to learn more about \"Abscessed Tooth: Care Instructions. \"     If you do not have an account, please click on the \"Sign Up Now\" link. Current as of: June 30, 2021               Content Version: 13.1  © 3659-2911 Healthwise, Incorporated. Care instructions adapted under license by [facility] ([facility]). If you have questions about a medical condition or this instruction, always ask your healthcare professional. [place] [address] any warranty or liability for your use of this information.

## 2022-01-07 NOTE — PROGRESS NOTES
Subjective  Hieu Marinelli, 63 y.o. male presents today with:  Chief Complaint   Patient presents with    Dental Pain     been about 4 days, lots of pain, no otc help. HPI   Presents to [facility] for dental abscess  Discomfort began 3 days ago  Working with dentist to have teeth extracted   Pain worsens at night   Denies fever or chills  Denies fatigue   Denies nausea   Eating soft food  Hydrating well   Quit smoking 3 weeks prior               Past Medical History:   Diagnosis Date    Abnormal finding on EKG 7/11/2017    HTN (hypertension) 1/13/2017      Past Surgical History:   Procedure Laterality Date    CYSTOSCOPY  06/07/2017    [facility] SKIM MD  BILATERAL RETROGRADE PYELOGRAMS BLADDER BX FULGURATION    LEG SURGERY Bilateral     screws and plates in both after broken      No family history on file. Review of Systems   Constitutional: Positive for appetite change, chills and diaphoresis. Negative for activity change, fatigue and fever. HENT: Positive for dental problem. Negative for sore throat, trouble swallowing and voice change. Gastrointestinal: Negative for diarrhea and nausea. Musculoskeletal: Negative for neck pain and neck stiffness. Neurological: Negative for dizziness, light-headedness and headaches. Psychiatric/Behavioral: Positive for sleep disturbance. PMH, Surgical Hx, Family Hx, and Social Hx reviewed and updated. Objective  Vitals:    01/07/22 1205   BP: 120/78   Site: Left Upper Arm   Position: Sitting   Cuff Size: Medium Adult   Pulse: 72   Temp: 97.5 °F (36.4 °C)   TempSrc: Tympanic   SpO2: 96%   Weight: 187 lb (84.8 kg)   Height: 5' 10\" (1.778 m)     BP Readings from Last 3 Encounters:   01/07/22 120/78   11/12/21 136/80   07/22/21 128/68     Wt Readings from Last 3 Encounters:   01/07/22 187 lb (84.8 kg)   12/27/21 184 lb (83.5 kg)   12/23/21 184 lb (83.5 kg)         Physical Exam  Vitals reviewed.    Constitutional:       General: He is not in acute distress. Appearance: Normal appearance. He is well-developed. He is not ill-appearing or toxic-appearing. HENT:      Right Ear: External ear normal.      Left Ear: External ear normal.      Nose: Nose normal.      Mouth/Throat:      Lips: Pink. Mouth: Mucous membranes are moist. No angioedema. Dentition: Abnormal dentition. Dental tenderness, gingival swelling, dental caries and dental abscesses present. Eyes:      Conjunctiva/sclera: Conjunctivae normal.   Cardiovascular:      Rate and Rhythm: Normal rate. Pulmonary:      Effort: Pulmonary effort is normal.   Musculoskeletal:      Cervical back: Normal range of motion. No rigidity. Comments: Uses cane for ambulation   Lymphadenopathy:      Head:      Right side of head: No tonsillar adenopathy. Left side of head: No tonsillar adenopathy. Skin:     General: Skin is warm and dry. Capillary Refill: Capillary refill takes less than 2 seconds. Coloration: Skin is not pale. Neurological:      Mental Status: He is alert and oriented to person, place, and time. Mental status is at baseline. Coordination: Coordination abnormal.      Gait: Gait abnormal.   Psychiatric:         Speech: Speech normal.            Assessment & Plan    Diagnosis Orders   1. Dental abscess  amoxicillin-clavulanate (AUGMENTIN) 875-125 MG per tablet   2. Pain, dental       No orders of the defined types were placed in this encounter. Orders Placed This Encounter   Medications    amoxicillin-clavulanate (AUGMENTIN) 875-125 MG per tablet     Sig: Take 1 tablet by mouth 2 times daily for 10 days     Dispense:  20 tablet     Refill:  0     Return for follow up with Dentistry        Reviewed with the patient: current clinical status & medications. Side effects, adverse effects of themedication prescribed today, as well as treatment plan/rationale and result expectations have been discussed with the patient who expressed understanding.       How can you care for yourself at home? · Reduce pain and swelling in your face and jaw by putting ice or a cold pack on the outside of your cheek. Do this for 10 to 20 minutes at a time. Put a thin cloth between the ice and your skin. · Take pain medicines exactly as directed. ? If the doctor gave you a prescription medicine for pain, take it as prescribed. ? If you are not taking a prescription pain medicine, ask your doctor if you can take an over-the-counter medicine. · Take antibiotics as directed. Do not stop taking them just because you feel better. You need to take the full course of antibiotics. To prevent tooth abscess  · Brush and floss every day. Have regular dental checkups. · Eat a healthy diet. Avoid sugary foods and drinks. · Do not smoke or vape with nicotine. And don't use spit tobacco. Tobacco and nicotine slow your ability to heal. They increase your risk for gum disease and cancer of the mouth and throat. If you need help quitting, talk to your doctor about stop-smoking programs and medicines. These can increase your chances of quitting for good. When should you call for help? Call 911 anytime you think you may need emergency care. For example, call if:    · You have trouble breathing. Seek immediate medical care if:    · You have new or worse symptoms of infection, such as:  ? Increased pain, swelling, warmth, or redness. ? Red streaks leading from the area. ? Pus draining from the area. ? A fever. Watch closely for changes in your health, and be sure to contact your doctor if:    · You do not get better as expected. Close follow up to evaluate treatment results and for coordination of care. I have reviewed the patient's medical history in detail and updated the computerized patient record.       Anabella Mary, SHREYA - ZOLTAN

## 2022-01-08 ASSESSMENT — ENCOUNTER SYMPTOMS
TROUBLE SWALLOWING: 0
VOICE CHANGE: 0
SORE THROAT: 0
NAUSEA: 0
DIARRHEA: 0

## 2022-03-24 ENCOUNTER — HOSPITAL ENCOUNTER (INPATIENT)
Age: 62
LOS: 6 days | Discharge: HOME | DRG: 872 | End: 2022-03-30
Attending: INTERNAL MEDICINE | Admitting: INTERNAL MEDICINE
Payer: MEDICARE

## 2022-03-24 ENCOUNTER — APPOINTMENT (OUTPATIENT)
Dept: CT IMAGING | Age: 62
DRG: 872 | End: 2022-03-24
Payer: MEDICARE

## 2022-03-24 DIAGNOSIS — N30.01 ACUTE CYSTITIS WITH HEMATURIA: Primary | ICD-10-CM

## 2022-03-24 PROBLEM — R10.9 FLANK PAIN: Status: ACTIVE | Noted: 2022-03-24

## 2022-03-24 LAB
ALBUMIN SERPL-MCNC: 4.2 G/DL (ref 3.5–4.6)
ALP BLD-CCNC: 92 U/L (ref 35–104)
ALT SERPL-CCNC: 25 U/L (ref 0–41)
ANION GAP SERPL CALCULATED.3IONS-SCNC: 20 MEQ/L (ref 9–15)
AST SERPL-CCNC: 22 U/L (ref 0–40)
BACTERIA: ABNORMAL /HPF
BASOPHILS ABSOLUTE: 0 K/UL (ref 0–0.2)
BASOPHILS RELATIVE PERCENT: 0.2 %
BILIRUB SERPL-MCNC: 0.3 MG/DL (ref 0.2–0.7)
BILIRUBIN URINE: NEGATIVE
BLOOD, URINE: ABNORMAL
BUN BLDV-MCNC: 12 MG/DL (ref 8–23)
CALCIUM SERPL-MCNC: 8.7 MG/DL (ref 8.5–9.9)
CHLORIDE BLD-SCNC: 105 MEQ/L (ref 95–107)
CLARITY: CLEAR
CO2: 17 MEQ/L (ref 20–31)
COLOR: YELLOW
CREAT SERPL-MCNC: 0.92 MG/DL (ref 0.7–1.2)
EOSINOPHILS ABSOLUTE: 0 K/UL (ref 0–0.7)
EOSINOPHILS RELATIVE PERCENT: 0.3 %
EPITHELIAL CELLS, UA: ABNORMAL /HPF (ref 0–5)
GFR AFRICAN AMERICAN: >60
GFR NON-AFRICAN AMERICAN: >60
GLOBULIN: 2.6 G/DL (ref 2.3–3.5)
GLUCOSE BLD-MCNC: 113 MG/DL (ref 70–99)
GLUCOSE URINE: NEGATIVE MG/DL
HCT VFR BLD CALC: 47.1 % (ref 42–52)
HEMOGLOBIN: 15.7 G/DL (ref 14–18)
HYALINE CASTS: ABNORMAL /HPF (ref 0–5)
KETONES, URINE: NEGATIVE MG/DL
LACTIC ACID: 4.2 MMOL/L (ref 0.5–2.2)
LACTIC ACID: 6.1 MMOL/L (ref 0.5–2.2)
LEUKOCYTE ESTERASE, URINE: ABNORMAL
LYMPHOCYTES ABSOLUTE: 0.5 K/UL (ref 1–4.8)
LYMPHOCYTES RELATIVE PERCENT: 9.4 %
MCH RBC QN AUTO: 31.2 PG (ref 27–31.3)
MCHC RBC AUTO-ENTMCNC: 33.3 % (ref 33–37)
MCV RBC AUTO: 93.9 FL (ref 80–100)
MONOCYTES ABSOLUTE: 0 K/UL (ref 0.2–0.8)
MONOCYTES RELATIVE PERCENT: 0.8 %
NEUTROPHILS ABSOLUTE: 5.1 K/UL (ref 1.4–6.5)
NEUTROPHILS RELATIVE PERCENT: 89.3 %
NITRITE, URINE: NEGATIVE
PDW BLD-RTO: 14.2 % (ref 11.5–14.5)
PH UA: 5.5 (ref 5–9)
PLATELET # BLD: 212 K/UL (ref 130–400)
POTASSIUM SERPL-SCNC: 3.9 MEQ/L (ref 3.4–4.9)
PROTEIN UA: NEGATIVE MG/DL
RBC # BLD: 5.01 M/UL (ref 4.7–6.1)
RBC UA: ABNORMAL /HPF (ref 0–5)
SODIUM BLD-SCNC: 142 MEQ/L (ref 135–144)
SPECIFIC GRAVITY UA: 1.03 (ref 1–1.03)
TOTAL PROTEIN: 6.8 G/DL (ref 6.3–8)
URINE REFLEX TO CULTURE: YES
UROBILINOGEN, URINE: 0.2 E.U./DL
WBC # BLD: 5.7 K/UL (ref 4.8–10.8)
WBC UA: ABNORMAL /HPF (ref 0–5)

## 2022-03-24 PROCEDURE — 80053 COMPREHEN METABOLIC PANEL: CPT

## 2022-03-24 PROCEDURE — 6360000004 HC RX CONTRAST MEDICATION

## 2022-03-24 PROCEDURE — 96375 TX/PRO/DX INJ NEW DRUG ADDON: CPT

## 2022-03-24 PROCEDURE — 81001 URINALYSIS AUTO W/SCOPE: CPT

## 2022-03-24 PROCEDURE — 96365 THER/PROPH/DIAG IV INF INIT: CPT

## 2022-03-24 PROCEDURE — 87077 CULTURE AEROBIC IDENTIFY: CPT

## 2022-03-24 PROCEDURE — 6360000002 HC RX W HCPCS

## 2022-03-24 PROCEDURE — 36415 COLL VENOUS BLD VENIPUNCTURE: CPT

## 2022-03-24 PROCEDURE — 2580000003 HC RX 258

## 2022-03-24 PROCEDURE — 87086 URINE CULTURE/COLONY COUNT: CPT

## 2022-03-24 PROCEDURE — 74177 CT ABD & PELVIS W/CONTRAST: CPT

## 2022-03-24 PROCEDURE — 93005 ELECTROCARDIOGRAM TRACING: CPT

## 2022-03-24 PROCEDURE — 85025 COMPLETE CBC W/AUTO DIFF WBC: CPT

## 2022-03-24 PROCEDURE — 96361 HYDRATE IV INFUSION ADD-ON: CPT

## 2022-03-24 PROCEDURE — 1210000000 HC MED SURG R&B

## 2022-03-24 PROCEDURE — 87040 BLOOD CULTURE FOR BACTERIA: CPT

## 2022-03-24 PROCEDURE — 87186 SC STD MICRODIL/AGAR DIL: CPT

## 2022-03-24 PROCEDURE — 99285 EMERGENCY DEPT VISIT HI MDM: CPT

## 2022-03-24 PROCEDURE — 83605 ASSAY OF LACTIC ACID: CPT

## 2022-03-24 RX ORDER — 0.9 % SODIUM CHLORIDE 0.9 %
1244 INTRAVENOUS SOLUTION INTRAVENOUS ONCE
Status: COMPLETED | OUTPATIENT
Start: 2022-03-24 | End: 2022-03-24

## 2022-03-24 RX ORDER — 0.9 % SODIUM CHLORIDE 0.9 %
1000 INTRAVENOUS SOLUTION INTRAVENOUS ONCE
Status: COMPLETED | OUTPATIENT
Start: 2022-03-24 | End: 2022-03-24

## 2022-03-24 RX ORDER — HALOPERIDOL 5 MG/ML
2 INJECTION INTRAMUSCULAR ONCE
Status: COMPLETED | OUTPATIENT
Start: 2022-03-24 | End: 2022-03-24

## 2022-03-24 RX ADMIN — IOPAMIDOL 100 ML: 612 INJECTION, SOLUTION INTRAVENOUS at 19:45

## 2022-03-24 RX ADMIN — SODIUM CHLORIDE 1244 ML: 9 INJECTION, SOLUTION INTRAVENOUS at 21:32

## 2022-03-24 RX ADMIN — HALOPERIDOL LACTATE 2 MG: 5 INJECTION, SOLUTION INTRAMUSCULAR at 19:27

## 2022-03-24 RX ADMIN — SODIUM CHLORIDE 1000 ML: 9 INJECTION, SOLUTION INTRAVENOUS at 19:05

## 2022-03-24 RX ADMIN — CEFTRIAXONE SODIUM 1000 MG: 1 INJECTION, POWDER, FOR SOLUTION INTRAMUSCULAR; INTRAVENOUS at 23:07

## 2022-03-24 ASSESSMENT — ENCOUNTER SYMPTOMS
BLOOD IN STOOL: 0
ABDOMINAL DISTENTION: 0
CONSTIPATION: 0
NAUSEA: 0
ABDOMINAL PAIN: 0
VOMITING: 0
COUGH: 0
DIARRHEA: 0
SHORTNESS OF BREATH: 0
PHOTOPHOBIA: 0

## 2022-03-24 ASSESSMENT — PAIN DESCRIPTION - FREQUENCY: FREQUENCY: CONTINUOUS

## 2022-03-24 ASSESSMENT — PAIN DESCRIPTION - ONSET: ONSET: ON-GOING

## 2022-03-24 ASSESSMENT — PAIN SCALES - GENERAL: PAINLEVEL_OUTOF10: 8

## 2022-03-24 ASSESSMENT — PAIN DESCRIPTION - LOCATION: LOCATION: FLANK

## 2022-03-24 ASSESSMENT — PAIN DESCRIPTION - DESCRIPTORS: DESCRIPTORS: SHARP

## 2022-03-24 ASSESSMENT — PAIN DESCRIPTION - ORIENTATION: ORIENTATION: RIGHT

## 2022-03-24 ASSESSMENT — PAIN DESCRIPTION - PAIN TYPE: TYPE: ACUTE PAIN

## 2022-03-24 ASSESSMENT — PAIN - FUNCTIONAL ASSESSMENT: PAIN_FUNCTIONAL_ASSESSMENT: 0-10

## 2022-03-24 ASSESSMENT — PAIN DESCRIPTION - PROGRESSION: CLINICAL_PROGRESSION: GRADUALLY WORSENING

## 2022-03-24 NOTE — ED TRIAGE NOTES
Pt presents to ED from home via EMS with c/o right-sided flank pain, hematuria, and dysuria x10 days. Pt arrives to ED soiled in bloody urine. Pt is intoxicated - admitted to drinking 1/2 a pint of liquor PTA. Pt is uncooperative and agitated. Upon assessment, pt is A/Ox4, skin p/w/d, resp even and unlabored, msp's intact. Pt denies cp, sob, diarrhea, fever.    + chills, + n/v.

## 2022-03-24 NOTE — ED PROVIDER NOTES
1978 [facility]  eMERGENCY dEPARTMENT eNCOUnter      Pt Name: Bobbye Dance  MRN: [id number]  [place] 1960  Date of evaluation: 3/24/2022  Provider: JERO Sales        HISTORY OF PRESENT ILLNESS    Bobbye Dance is a 63 y.o. male per chart review has ah/o hypertension, recurrent urinary tract infection, BPH. Patient presents to the emergency department with complaint of hematuria and right flank pain x10 days. Patient has history of recurrent urinary tract infections as well as sepsis due to UTI, e.coli bacteremia on chart review. He is denying any history of kidney stone. No radiation of the pain. He does state that he has drank approximately 1/5 of a bottle of liquor today, states his daily use is typically 5-6 shots per day. He presents intoxicated. He has previously followed with urology Dr. Callum Spears. Per chart review there has been concern in the past for bladder neoplasm, cannot find further identification of this, patient cannot provide further history at this time. REVIEW OF SYSTEMS       Review of Systems   Constitutional: Positive for diaphoresis. Negative for chills and fever. HENT: Negative for congestion. Eyes: Negative for photophobia and visual disturbance. Respiratory: Negative for cough and shortness of breath. Cardiovascular: Negative for chest pain and palpitations. Gastrointestinal: Negative for abdominal distention, abdominal pain, blood in stool, constipation, diarrhea, nausea and vomiting. Genitourinary: Positive for dysuria, flank pain and hematuria. Negative for difficulty urinating, penile discharge, penile pain and urgency. Musculoskeletal: Negative for myalgias. Neurological: Negative for headaches. Psychiatric/Behavioral: Negative for confusion. Except as noted above the remainder of the review of systems was reviewed and negative.        PAST MEDICAL HISTORY     Past Medical History:   Diagnosis Date    Abnormal finding on EKG 2017    Chronic back pain     Erectile dysfunction     HTN (hypertension) 2017    Sleep apnea     Substance abuse ([facility] [address]     Urinary incontinence          SURGICAL HISTORY       Past Surgical History:   Procedure Laterality Date    CYSTOSCOPY  2017    [facility] SKIM MD  BILATERAL RETROGRADE PYELOGRAMS BLADDER BX FULGURATION    FRACTURE SURGERY      LEG SURGERY Bilateral     screws and plates in both after broken          CURRENT MEDICATIONS       Current Discharge Medication List      CONTINUE these medications which have NOT CHANGED    Details   amLODIPine (NORVASC) 10 MG tablet take 1 tablet by mouth once daily  Qty: 90 tablet, Refills: 3    Associated Diagnoses: Essential hypertension      metoprolol tartrate (LOPRESSOR) 25 MG tablet take 1 tablet by mouth twice a day  Qty: 180 tablet, Refills: 3    Associated Diagnoses: Essential hypertension             ALLERGIES     Statins and Proscar [finasteride]    FAMILY HISTORY     History reviewed. No pertinent family history. SOCIAL HISTORY       Social History     Socioeconomic History    Marital status: Single     Spouse name: None    Number of children: None    Years of education: None    Highest education level: None   Occupational History    None   Tobacco Use    Smoking status: Former Smoker     Packs/day: 1.00     Years: 20.00     Pack years: 20.00     Types: Cigarettes     Quit date: 12/15/2021     Years since quittin.2    Smokeless tobacco: Never Used   Substance and Sexual Activity    Alcohol use:  Yes     Alcohol/week: 5.0 standard drinks     Types: 5 Shots of liquor per week     Comment: socially    Drug use: Yes     Frequency: 1.0 times per week     Types: Marijuana Fredick Copping)     Comment: daily    Sexual activity: None   Other Topics Concern    None   Social History Narrative    None     Social Determinants of Health     Financial Resource Strain: Low Risk     Difficulty of Paying Living Expenses: Not very hard   Food Insecurity: No Food Insecurity    Worried About Running Out of Food in the Last Year: Never true    Ran Out of Food in the Last Year: Never true   Transportation Needs:     Lack of Transportation (Medical): Not on file    Lack of Transportation (Non-Medical): Not on file   Physical Activity:     Days of Exercise per Week: Not on file    Minutes of Exercise per Session: Not on file   Stress:     Feeling of Stress : Not on file   Social Connections:     Frequency of Communication with Friends and Family: Not on file    Frequency of Social Gatherings with Friends and Family: Not on file    Attends Bahai Services: Not on file    Active Member of [address] [place] [address] [employer] or Organizations: Not on file    Attends Club or Organization Meetings: Not on file    Marital Status: Not on file   Intimate Partner Violence:     Fear of Current or Ex-Partner: Not on file    Emotionally Abused: Not on file    Physically Abused: Not on file    Sexually Abused: Not on file   Housing Stability:     Unable to Pay for Housing in the Last Year: Not on file    Number of [place] in the Last Year: Not on file    Unstable Housing in the Last Year: Not on file         PHYSICAL EXAM        ED Triage Vitals   BP Temp Temp Source Pulse Resp SpO2 Height Weight   03/24/22 1840 03/24/22 1844 03/24/22 1840 03/24/22 1840 03/24/22 1840 03/24/22 1840 03/24/22 1840 03/24/22 1840   108/62 99.6 °F (37.6 °C) Oral 138 12 96 % 5' 10\" (1.778 m) 165 lb (74.8 kg)       Physical Exam  Constitutional:       General: He is not in acute distress. Appearance: Normal appearance. He is ill-appearing and diaphoretic. HENT:      Head: Normocephalic and atraumatic. Right Ear: External ear normal.      Left Ear: External ear normal.      Nose: Nose normal.      Mouth/Throat:      Mouth: Mucous membranes are moist.      Pharynx: Oropharynx is clear. Eyes:      Extraocular Movements: Extraocular movements intact.       Conjunctiva/sclera: Conjunctivae normal.      Pupils: Pupils are equal, round, and reactive to light. Cardiovascular:      Rate and Rhythm: Regular rhythm. Tachycardia present. Pulses: Normal pulses. Pulmonary:      Effort: Pulmonary effort is normal. No respiratory distress. Breath sounds: Normal breath sounds. Abdominal:      General: Bowel sounds are normal. There is no distension. Palpations: Abdomen is soft. There is no mass. Tenderness: There is no abdominal tenderness. There is right CVA tenderness. There is no left CVA tenderness, guarding or rebound. Musculoskeletal:         General: No deformity or signs of injury. Normal range of motion. Cervical back: Normal range of motion. Right lower leg: No edema. Left lower leg: No edema. Skin:     General: Skin is warm. Neurological:      Mental Status: He is alert and oriented to person, place, and time. Psychiatric:         Mood and Affect: Mood is anxious. Behavior: Behavior is agitated and hyperactive.            LABS:  Labs Reviewed   COMPREHENSIVE METABOLIC PANEL - Abnormal; Notable for the following components:       Result Value    CO2 17 (*)     Anion Gap 20 (*)     Glucose 113 (*)     All other components within normal limits   CBC WITH AUTO DIFFERENTIAL - Abnormal; Notable for the following components:    Lymphocytes Absolute 0.5 (*)     Monocytes Absolute 0.0 (*)     All other components within normal limits   URINALYSIS WITH REFLEX TO CULTURE - Abnormal; Notable for the following components:    Blood, Urine MODERATE (*)     Leukocyte Esterase, Urine MODERATE (*)     All other components within normal limits   LACTIC ACID - Abnormal; Notable for the following components:    Lactic Acid 6.1 (*)     All other components within normal limits    Narrative:     CALL  [facility] tel. [phone],  LACTIC ACID results called to and read back by JEREMY STEVE RN, 03/24/2022  19:44, by Nadia Rodriguez [id number] - Abnormal; Notable for the following components:    Bacteria, UA FEW (*)     WBC, UA 20-50 (*)     RBC, UA  (*)     All other components within normal limits   LACTIC ACID - Abnormal; Notable for the following components:    Lactic Acid 4.2 (*)     All other components within normal limits    Narrative:     Mesha Locke tel. [phone],  Chemistry results called to and read back by RANCHO WU, 03/24/2022 23:31,  by [facility]   CULTURE, BLOOD 1   CULTURE, BLOOD 2   CULTURE, URINE   ETHANOL   COMPREHENSIVE METABOLIC PANEL W/ REFLEX TO MG FOR LOW K   CBC WITH AUTO DIFFERENTIAL         MDM:   Vitals:    Vitals:    03/24/22 2121 03/24/22 2222 03/24/22 2337 03/25/22 0037   BP: 129/68 118/61 (!) 114/53 (!) 109/54   Pulse: 122 114 120 124   Resp: 14 20 18 18   Temp:    99 °F (37.2 °C)   TempSrc:       SpO2: 98% 95% 98% 96%   Weight:    199 lb 6.4 oz (90.4 kg)   Height:    5' 10\" (1.778 m)       EKG sinus tachycardia with PVCs, , no acute ST elevations, regular intervals, no axis deviation. 63-year-old male patient presents to the emergency department for right-sided flank pain and hematuria x10 days. Patient previously followed with urology due to BPH, urinary retention, hematuria issues. He presents intoxicated today. Afebrile, he is hemodynamically stable but remains tachycardic in the emergency department. He is given 30 cc per kg fluid bolus, stays in the 110s with this tachycardia. Urinalysis is infectious. He is given IV Rocephin. Lactic is 6.1 and redraw is pending. No significant leukocytosis, no fever. CT abdomen pelvis is done and demonstrates mild bilateral hydronephrosis and hydroureter with thickening of the wall of the urinary bladder. Bladder not noted to be remarkably distended. No evidence of radiopaque stones, CT done with contrast.  Also mild hypertrophy of the prostate gland with prostatic calcifications that are similar to previous study.   Impression is findings may be secondary to bladder outlet obstruction and VUR. Decision to admit patient for continued IV antibiotics and close urologic consultation. Dr Ana Suazo accepts            Hermann Hendricks   Total CriticalCare time was 0 minutes, excluding separately reportable procedures. There was a high probability of clinically significant/life threatening deterioration in the patient's condition which required my urgent intervention. PROCEDURES:  Unlessotherwise noted below, none      Procedures      FINAL IMPRESSION      1.  Acute cystitis with hematuria          DISPOSITION/PLAN   DISPOSITION Admitted 03/24/2022 11:41:49 PM          JERO Mcguire (electronically signed)  Attending Emergency Physician          Faisal Mcguire  03/25/22 0388

## 2022-03-25 LAB
ALBUMIN SERPL-MCNC: 3.5 G/DL (ref 3.5–4.6)
ALP BLD-CCNC: 58 U/L (ref 35–104)
ALT SERPL-CCNC: 27 U/L (ref 0–41)
ANION GAP SERPL CALCULATED.3IONS-SCNC: 14 MEQ/L (ref 9–15)
AST SERPL-CCNC: 42 U/L (ref 0–40)
BANDED NEUTROPHILS RELATIVE PERCENT: 6 %
BASOPHILS ABSOLUTE: 0 K/UL (ref 0–0.2)
BASOPHILS RELATIVE PERCENT: 0.2 %
BILIRUB SERPL-MCNC: 0.4 MG/DL (ref 0.2–0.7)
BUN BLDV-MCNC: 8 MG/DL (ref 8–23)
CALCIUM SERPL-MCNC: 8.1 MG/DL (ref 8.5–9.9)
CHLORIDE BLD-SCNC: 105 MEQ/L (ref 95–107)
CO2: 18 MEQ/L (ref 20–31)
CREAT SERPL-MCNC: 0.68 MG/DL (ref 0.7–1.2)
EKG ATRIAL RATE: 121 BPM
EKG P AXIS: 65 DEGREES
EKG P-R INTERVAL: 122 MS
EKG Q-T INTERVAL: 332 MS
EKG QRS DURATION: 86 MS
EKG QTC CALCULATION (BAZETT): 471 MS
EKG R AXIS: -8 DEGREES
EKG T AXIS: 61 DEGREES
EKG VENTRICULAR RATE: 121 BPM
EOSINOPHILS ABSOLUTE: 0 K/UL (ref 0–0.7)
EOSINOPHILS RELATIVE PERCENT: 0.1 %
ETHANOL PERCENT: NORMAL G/DL
ETHANOL: <10 MG/DL (ref 0–0.08)
GFR AFRICAN AMERICAN: >60
GFR NON-AFRICAN AMERICAN: >60
GLOBULIN: 2.5 G/DL (ref 2.3–3.5)
GLUCOSE BLD-MCNC: 107 MG/DL (ref 70–99)
HCT VFR BLD CALC: 38.5 % (ref 42–52)
HEMOGLOBIN: 12.9 G/DL (ref 14–18)
LACTIC ACID: 1 MMOL/L (ref 0.5–2.2)
LYMPHOCYTES ABSOLUTE: 0.7 K/UL (ref 1–4.8)
LYMPHOCYTES RELATIVE PERCENT: 5 %
MCH RBC QN AUTO: 30.9 PG (ref 27–31.3)
MCHC RBC AUTO-ENTMCNC: 33.5 % (ref 33–37)
MCV RBC AUTO: 92.3 FL (ref 80–100)
MONOCYTES ABSOLUTE: 0.5 K/UL (ref 0.2–0.8)
MONOCYTES RELATIVE PERCENT: 3.8 %
NEUTROPHILS ABSOLUTE: 12.3 K/UL (ref 1.4–6.5)
NEUTROPHILS RELATIVE PERCENT: 86 %
PDW BLD-RTO: 14.4 % (ref 11.5–14.5)
PLATELET # BLD: 191 K/UL (ref 130–400)
PLATELET SLIDE REVIEW: NORMAL
POTASSIUM REFLEX MAGNESIUM: 4.1 MEQ/L (ref 3.4–4.9)
RBC # BLD: 4.17 M/UL (ref 4.7–6.1)
RBC # BLD: NORMAL 10*6/UL
SODIUM BLD-SCNC: 137 MEQ/L (ref 135–144)
TOTAL PROTEIN: 6 G/DL (ref 6.3–8)
WBC # BLD: 13.4 K/UL (ref 4.8–10.8)

## 2022-03-25 PROCEDURE — 6370000000 HC RX 637 (ALT 250 FOR IP): Performed by: INTERNAL MEDICINE

## 2022-03-25 PROCEDURE — 6360000002 HC RX W HCPCS: Performed by: INTERNAL MEDICINE

## 2022-03-25 PROCEDURE — 36415 COLL VENOUS BLD VENIPUNCTURE: CPT

## 2022-03-25 PROCEDURE — 82077 ASSAY SPEC XCP UR&BREATH IA: CPT

## 2022-03-25 PROCEDURE — 93010 ELECTROCARDIOGRAM REPORT: CPT | Performed by: INTERNAL MEDICINE

## 2022-03-25 PROCEDURE — 85025 COMPLETE CBC W/AUTO DIFF WBC: CPT

## 2022-03-25 PROCEDURE — 1210000000 HC MED SURG R&B

## 2022-03-25 PROCEDURE — 80053 COMPREHEN METABOLIC PANEL: CPT

## 2022-03-25 PROCEDURE — 2580000003 HC RX 258: Performed by: INTERNAL MEDICINE

## 2022-03-25 PROCEDURE — 83605 ASSAY OF LACTIC ACID: CPT

## 2022-03-25 PROCEDURE — 6370000000 HC RX 637 (ALT 250 FOR IP): Performed by: UROLOGY

## 2022-03-25 PROCEDURE — 99222 1ST HOSP IP/OBS MODERATE 55: CPT | Performed by: UROLOGY

## 2022-03-25 RX ORDER — SODIUM CHLORIDE 0.9 % (FLUSH) 0.9 %
5-40 SYRINGE (ML) INJECTION PRN
Status: DISCONTINUED | OUTPATIENT
Start: 2022-03-25 | End: 2022-03-30 | Stop reason: HOSPADM

## 2022-03-25 RX ORDER — THIAMINE HYDROCHLORIDE 100 MG/ML
100 INJECTION, SOLUTION INTRAMUSCULAR; INTRAVENOUS DAILY
Status: DISCONTINUED | OUTPATIENT
Start: 2022-03-25 | End: 2022-03-30 | Stop reason: HOSPADM

## 2022-03-25 RX ORDER — SODIUM CHLORIDE 0.9 % (FLUSH) 0.9 %
5-40 SYRINGE (ML) INJECTION EVERY 12 HOURS SCHEDULED
Status: DISCONTINUED | OUTPATIENT
Start: 2022-03-25 | End: 2022-03-30 | Stop reason: HOSPADM

## 2022-03-25 RX ORDER — LORAZEPAM 1 MG/1
4 TABLET ORAL
Status: DISCONTINUED | OUTPATIENT
Start: 2022-03-25 | End: 2022-03-30 | Stop reason: HOSPADM

## 2022-03-25 RX ORDER — LORAZEPAM 1 MG/1
2 TABLET ORAL
Status: DISCONTINUED | OUTPATIENT
Start: 2022-03-25 | End: 2022-03-30 | Stop reason: HOSPADM

## 2022-03-25 RX ORDER — SODIUM CHLORIDE 0.9 % (FLUSH) 0.9 %
5-40 SYRINGE (ML) INJECTION EVERY 12 HOURS SCHEDULED
Status: DISCONTINUED | OUTPATIENT
Start: 2022-03-25 | End: 2022-03-25 | Stop reason: SDUPTHER

## 2022-03-25 RX ORDER — LORAZEPAM 2 MG/ML
1 INJECTION INTRAMUSCULAR
Status: DISCONTINUED | OUTPATIENT
Start: 2022-03-25 | End: 2022-03-30 | Stop reason: HOSPADM

## 2022-03-25 RX ORDER — ACETAMINOPHEN 325 MG/1
650 TABLET ORAL EVERY 6 HOURS PRN
Status: DISCONTINUED | OUTPATIENT
Start: 2022-03-25 | End: 2022-03-28

## 2022-03-25 RX ORDER — POLYETHYLENE GLYCOL 3350 17 G/17G
17 POWDER, FOR SOLUTION ORAL DAILY PRN
Status: DISCONTINUED | OUTPATIENT
Start: 2022-03-25 | End: 2022-03-30 | Stop reason: HOSPADM

## 2022-03-25 RX ORDER — AMLODIPINE BESYLATE 10 MG/1
10 TABLET ORAL DAILY
Status: DISCONTINUED | OUTPATIENT
Start: 2022-03-25 | End: 2022-03-30 | Stop reason: HOSPADM

## 2022-03-25 RX ORDER — LORAZEPAM 1 MG/1
3 TABLET ORAL
Status: DISCONTINUED | OUTPATIENT
Start: 2022-03-25 | End: 2022-03-30 | Stop reason: HOSPADM

## 2022-03-25 RX ORDER — LORAZEPAM 2 MG/ML
4 INJECTION INTRAMUSCULAR
Status: DISCONTINUED | OUTPATIENT
Start: 2022-03-25 | End: 2022-03-30 | Stop reason: HOSPADM

## 2022-03-25 RX ORDER — TAMSULOSIN HYDROCHLORIDE 0.4 MG/1
0.4 CAPSULE ORAL DAILY
Status: DISCONTINUED | OUTPATIENT
Start: 2022-03-25 | End: 2022-03-30 | Stop reason: HOSPADM

## 2022-03-25 RX ORDER — SODIUM CHLORIDE 0.9 % (FLUSH) 0.9 %
5-40 SYRINGE (ML) INJECTION PRN
Status: DISCONTINUED | OUTPATIENT
Start: 2022-03-25 | End: 2022-03-25 | Stop reason: SDUPTHER

## 2022-03-25 RX ORDER — ONDANSETRON 4 MG/1
4 TABLET, ORALLY DISINTEGRATING ORAL EVERY 8 HOURS PRN
Status: DISCONTINUED | OUTPATIENT
Start: 2022-03-25 | End: 2022-03-30 | Stop reason: HOSPADM

## 2022-03-25 RX ORDER — ONDANSETRON 2 MG/ML
4 INJECTION INTRAMUSCULAR; INTRAVENOUS EVERY 6 HOURS PRN
Status: DISCONTINUED | OUTPATIENT
Start: 2022-03-25 | End: 2022-03-30 | Stop reason: HOSPADM

## 2022-03-25 RX ORDER — SODIUM CHLORIDE 9 MG/ML
INJECTION, SOLUTION INTRAVENOUS CONTINUOUS
Status: DISCONTINUED | OUTPATIENT
Start: 2022-03-25 | End: 2022-03-29

## 2022-03-25 RX ORDER — ACETAMINOPHEN 500 MG
1000 TABLET ORAL 2 TIMES DAILY
Status: DISCONTINUED | OUTPATIENT
Start: 2022-03-25 | End: 2022-03-26

## 2022-03-25 RX ORDER — LORAZEPAM 1 MG/1
1 TABLET ORAL
Status: DISCONTINUED | OUTPATIENT
Start: 2022-03-25 | End: 2022-03-30 | Stop reason: HOSPADM

## 2022-03-25 RX ORDER — SODIUM CHLORIDE 9 MG/ML
25 INJECTION, SOLUTION INTRAVENOUS PRN
Status: DISCONTINUED | OUTPATIENT
Start: 2022-03-25 | End: 2022-03-30 | Stop reason: HOSPADM

## 2022-03-25 RX ORDER — KETOROLAC TROMETHAMINE 15 MG/ML
30 INJECTION, SOLUTION INTRAMUSCULAR; INTRAVENOUS 3 TIMES DAILY
Status: DISCONTINUED | OUTPATIENT
Start: 2022-03-25 | End: 2022-03-27

## 2022-03-25 RX ORDER — LORAZEPAM 2 MG/ML
2 INJECTION INTRAMUSCULAR
Status: DISCONTINUED | OUTPATIENT
Start: 2022-03-25 | End: 2022-03-30 | Stop reason: HOSPADM

## 2022-03-25 RX ORDER — LORAZEPAM 2 MG/ML
3 INJECTION INTRAMUSCULAR
Status: DISCONTINUED | OUTPATIENT
Start: 2022-03-25 | End: 2022-03-30 | Stop reason: HOSPADM

## 2022-03-25 RX ORDER — ACETAMINOPHEN 650 MG/1
650 SUPPOSITORY RECTAL EVERY 6 HOURS PRN
Status: DISCONTINUED | OUTPATIENT
Start: 2022-03-25 | End: 2022-03-28

## 2022-03-25 RX ORDER — SODIUM CHLORIDE 9 MG/ML
25 INJECTION, SOLUTION INTRAVENOUS PRN
Status: DISCONTINUED | OUTPATIENT
Start: 2022-03-25 | End: 2022-03-25 | Stop reason: SDUPTHER

## 2022-03-25 RX ADMIN — CEFTRIAXONE SODIUM 1000 MG: 1 INJECTION, POWDER, FOR SOLUTION INTRAMUSCULAR; INTRAVENOUS at 23:14

## 2022-03-25 RX ADMIN — Medication 650 MG: at 03:59

## 2022-03-25 RX ADMIN — METOPROLOL TARTRATE 25 MG: 25 TABLET, FILM COATED ORAL at 19:57

## 2022-03-25 RX ADMIN — TAMSULOSIN HYDROCHLORIDE 0.4 MG: 0.4 CAPSULE ORAL at 23:11

## 2022-03-25 RX ADMIN — SODIUM CHLORIDE: 9 INJECTION, SOLUTION INTRAVENOUS at 03:51

## 2022-03-25 RX ADMIN — ACETAMINOPHEN 1000 MG: 500 TABLET ORAL at 08:36

## 2022-03-25 RX ADMIN — LORAZEPAM 3 MG: 2 INJECTION INTRAMUSCULAR; INTRAVENOUS at 08:35

## 2022-03-25 RX ADMIN — METOPROLOL TARTRATE 25 MG: 25 TABLET, FILM COATED ORAL at 05:15

## 2022-03-25 RX ADMIN — LORAZEPAM 2 MG: 2 INJECTION INTRAMUSCULAR at 04:15

## 2022-03-25 RX ADMIN — THIAMINE HYDROCHLORIDE 100 MG: 100 INJECTION, SOLUTION INTRAMUSCULAR; INTRAVENOUS at 08:34

## 2022-03-25 RX ADMIN — KETOROLAC TROMETHAMINE 30 MG: 15 INJECTION, SOLUTION INTRAMUSCULAR; INTRAVENOUS at 08:35

## 2022-03-25 RX ADMIN — Medication 10 ML: at 19:58

## 2022-03-25 RX ADMIN — Medication 10 ML: at 08:36

## 2022-03-25 RX ADMIN — KETOROLAC TROMETHAMINE 30 MG: 15 INJECTION, SOLUTION INTRAMUSCULAR; INTRAVENOUS at 19:57

## 2022-03-25 RX ADMIN — AMLODIPINE BESYLATE 10 MG: 10 TABLET ORAL at 08:36

## 2022-03-25 RX ADMIN — Medication 650 MG: at 18:42

## 2022-03-25 RX ADMIN — LORAZEPAM 2 MG: 2 INJECTION INTRAMUSCULAR at 16:46

## 2022-03-25 RX ADMIN — ONDANSETRON 4 MG: 2 INJECTION INTRAMUSCULAR; INTRAVENOUS at 08:35

## 2022-03-25 RX ADMIN — LORAZEPAM 2 MG: 2 INJECTION INTRAMUSCULAR at 18:46

## 2022-03-25 ASSESSMENT — PAIN SCALES - GENERAL
PAINLEVEL_OUTOF10: 0
PAINLEVEL_OUTOF10: 5
PAINLEVEL_OUTOF10: 0
PAINLEVEL_OUTOF10: 5
PAINLEVEL_OUTOF10: 0
PAINLEVEL_OUTOF10: 7
PAINLEVEL_OUTOF10: 9

## 2022-03-25 NOTE — PROGRESS NOTES
Pt was somewhat resting in bed. VSS stable, BP's soft, still sinus tach on the monitor. Pt is on CIWA protocol with last score of a 12 medicated with 2mg ativan IVP by night RN. RN will continue to monitor.  Man Muniz RN

## 2022-03-25 NOTE — PROGRESS NOTES
Spiritual Care Services     Summary of Visit:   visited with patient and family on 2W. Patient was sleeping. Family said they had no current needs at the moment but would contact spiritual care if the need arose. Spiritual Assessment/Intervention/Outcomes:    Encounter Summary  Services provided to[de-identified] Patient and family together  Referral/Consult From[de-identified] [facility]  Support System: [facility] members  Continue Visiting: No  Complexity of Encounter: Low  Length of Encounter: 15 minutes  Spiritual Assessment Completed: Yes  Routine  Type: Initial  Assessment: Calm,Approachable,Sleeping  Intervention: [place],Sustaining presence/ Ministry of presence  Outcome: Receptive              Primary Decision Maker (Healthcare Proxy)  [facility] is[de-identified]  (none)           Values / Beliefs  Do you have any ethnic, cultural, sacramental, or spiritual Buddhism needs you would like us to be aware of while you are in the hospital?: No    Care Plan:        [id number]   Electronically signed by Lacey Eid on 3/25/22 at 3:27 PM EDT     To reach a  for emotional and spiritual support, place an [facility] consult request.   If a  is needed immediately, dial 0 and ask to page the on-call .

## 2022-03-25 NOTE — FLOWSHEET NOTE
00:30: patient arrived to the floor from E.D. via cart. 00:45 patient is alert and oriented but disoriented to time,his lung sounds are clear,apical pulse regular at 118 beats/min.,abdomen soft,active bowel sounds,no edema but his right leg is shorter than his left leg.    01:00 he voided 150 ml of foul smelling yellow cloudy urine. 04:15  Lorazepam 2 mg iv push was given  Per [facility] protocol,he was very restless and a little agitated,inserted a new iv line  Into his right medial vein ,he tolerated the procedure well.    06:17 He is in bed,he is sleeping,sinus rhythm with a heart rate of 88.his call light is within his easy reach.

## 2022-03-25 NOTE — PROGRESS NOTES
Pt seen and examined full consult dictated. Start Flomax daily. No plans for any Urologic intervention. No Lovenox or other anticoagulants. CT findings due to UTI and prior h/o neurogenic bladder, normal Creat. Voiding currently would not recommend a Bell.

## 2022-03-25 NOTE — CARE COORDINATION
[facility] [place] Case Management Initial Discharge Assessment    Met with Patient to discuss discharge plan. PCP: Tana Isabel MD                                Date of Last Visit:                                                                                  February 2022    [facility] Patient: No        [facility] Notified: no    If no PCP, list provided? N/A    Discharge Planning    Living Arrangements: independently at home    Who do you live with? Alone / Patients  in [place] stays with patient often. Who helps you with your care:  self    If lives at home:     Do you have any barriers navigating in your home? no    Patient can perform ADL? Yes    Current Services (outpatient and in home) :  None    Dialysis: No    Is transportation available to get to your appointments? Yes    DME Equipment:  yes - Cane     Respiratory equipment: None    Respiratory provider:  no     Pharmacy:  no    Consult with Medication Assistance Program?  No      Patient agreeable to [address]? No    Patient agreeable to SNF/Rehab? No    Other discharge needs identified? N/A    Does Patient Have a High-Risk for Readmission Diagnosis (CHF, PN, MI, COPD)? No      Initial Discharge Plan? (Note: please see concurrent daily documentation for any updates after initial note). PATIENT IS SUPPORTED WELL BY HIS SISTER IN [place]- SHE OFTEN STAYS WITH PATIENT. PATIENT PLANS TO RETURN HOME UPON DISCHARGE.       Readmission Risk              Risk of Unplanned Readmission:  [address]  Electronically signed by BALDEMAR Figueroa, ERICK on 3/25/2022 at 2:58 PM

## 2022-03-25 NOTE — PROGRESS NOTES
RN entered room pt was screaming in pain, severely diaphoretic, confused, excruciating headache as well as severe tremors in all extremities. Pt also was incontinent of urine and some stool, RN scored pt on the CIWA scale to which he scored a 20 and was medicated appropiately with 3mg Lorazepam IVP in addition to his regular morning medications. Pt is sinus tach on the monitor, RN placed pt on 2.5L of NC and continuous pulse ox to monitor his HR. Bed rails will be padded for seizure precautions. BP still soft. Bed alarm in place. RN will continue to monitor closely.   Man Muniz RN

## 2022-03-25 NOTE — PROGRESS NOTES
There is already H&P from today. Continue current care. Add Toradol and Tylenol for pain. Continue CIWA protocol. Patient was seen and evaluated. Urology already reconsulted.
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
## 2022-03-25 NOTE — H&P
Hospitalist Group   History and Physical      CHIEF COMPLAINT: Right flank pain, hematuria    History of Present Illness:  63 y.o. male with a history of hypertension, BPH, chronic back pain, substance abuse, alcohol abuse, urinary incontinence presents with right flank pain and hematuria. Patient has been having dysuria and difficulty urinating for a while. He said that he started having right flank pain about 10 days ago but he keeps changing his story and the timing of his symptoms. He mentioned that he noticed blood in his urine and for that reason he decided to come to the ED. Patient is confabulating and it is very difficult to get accurate history from him. He was intoxicated on presentation to the ER. He initially denied drinking alcohol regularly but then confessed that he drinks 5-6 shots a day. If likely patient drinks more than. He currently denies shortness breath, cough, nausea, vomiting, abdominal pain other than right side flank pain. He denied taking antiplatelet or anticoagulation but mentioned that he used to be taking aspirin but not anymore. REVIEW OF SYSTEMS:  no fevers, chills, cp, sob, n/v, ha, vision/hearing changes, wt changes, hot/cold flashes, other open skin lesions, diarrhea, constipation, has dysuria/hematuria. Complete ROS performed with the patient and is otherwise negative.       PMH:  Past Medical History:   Diagnosis Date    Abnormal finding on EKG 7/11/2017    Chronic back pain     Erectile dysfunction     HTN (hypertension) 1/13/2017    Sleep apnea     Substance abuse ([facility] [address]     Urinary incontinence        Surgical History:  Past Surgical History:   Procedure Laterality Date    CYSTOSCOPY  06/07/2017    [facility] SK[facility] MD  BILATERAL RETROGRADE PYELOGRAMS BLADDER BX FULGURATION    FRACTURE SURGERY      LEG SURGERY Bilateral     screws and plates in both after broken        Medications Prior to Admission:    Prior to Admission medications    Medication Sig Start Date End Date Taking? Authorizing Provider   amLODIPine (NORVASC) 10 MG tablet take 1 tablet by mouth once daily 11/12/21   Gonzales Callaway MD   metoprolol tartrate (LOPRESSOR) 25 MG tablet take 1 tablet by mouth twice a day 11/12/21   Gonzales Callaway MD       Allergies:    Statins and Proscar [finasteride]    Social History:    reports that he quit smoking about 3 months ago. His smoking use included cigarettes. He has a 20.00 pack-year smoking history. He has never used smokeless tobacco. He reports current alcohol use of about 5.0 standard drinks of alcohol per week. He reports current drug use. Frequency: 1.00 time per week. Drug: Marijuana Deboraha Sanes). Family History:   History reviewed. No pertinent family history.     PHYSICAL EXAM:  Vitals:  BP (!) 109/54   Pulse 124   Temp 99 °F (37.2 °C)   Resp 18   Ht 5' 10\" (1.778 m)   Wt 199 lb 6.4 oz (90.4 kg)   SpO2 96%   BMI 28.61 kg/m²   General Appearance: alert and oriented to person, place and time, well developed and well- nourished, in no acute distress  Skin: warm and dry, no rash or erythema  Head: normocephalic and atraumatic  Eyes: pupils equal, round, and reactive to light, extraocular eye movements intact, conjunctivae normal  ENT: tympanic membrane, external ear and ear canal normal bilaterally, nose without deformity, nasal mucosa and turbinates normal without polyps  Neck: supple and non-tender without mass, no thyromegaly or thyroid nodules, no cervical lymphadenopathy  Pulmonary/Chest: clear to auscultation bilaterally- no wheezes   Cardiovascular: normal rate, regular rhythm, normal S1 and S2, no murmurs   Abdomen: soft, non-tender, non-distended, normal bowel sounds, no masses or organomegaly  Extremities: no cyanosis, clubbing or edema  Musculoskeletal: normal range of motion, no joint swelling, deformity or tenderness  Neurologic: reflexes normal and symmetric, no cranial nerve deficit        LABS:  Recent Labs     03/24/22 1915      K 3.9    CO2 17*   BUN 12   CREATININE 0.92   GLUCOSE 113*   CALCIUM 8.7       Recent Labs     03/24/22  1915   WBC 5.7   RBC 5.01   HGB 15.7   HCT 47.1   MCV 93.9   MCH 31.2   MCHC 33.3   RDW 14.2          No results for input(s): POCGLU in the last 72 hours. CBC with Differential:    Lab Results   Component Value Date    WBC 5.7 03/24/2022    RBC 5.01 03/24/2022    RBC 4.17 10/19/2011    HGB 15.7 03/24/2022    HCT 47.1 03/24/2022     03/24/2022    MCV 93.9 03/24/2022    MCH 31.2 03/24/2022    MCHC 33.3 03/24/2022    RDW 14.2 03/24/2022    LYMPHOPCT 9.4 03/24/2022    MONOPCT 0.8 03/24/2022    EOSPCT 0.4 10/19/2011    BASOPCT 0.2 03/24/2022    MONOSABS 0.0 03/24/2022    LYMPHSABS 0.5 03/24/2022    EOSABS 0.0 03/24/2022    BASOSABS 0.0 03/24/2022     CMP:    Lab Results   Component Value Date     03/24/2022    K 3.9 03/24/2022     03/24/2022    CO2 17 03/24/2022    BUN 12 03/24/2022    CREATININE 0.92 03/24/2022    GFRAA >60.0 03/24/2022    LABGLOM >60.0 03/24/2022    GLUCOSE 113 03/24/2022    GLUCOSE 157 10/19/2011    PROT 6.8 03/24/2022    LABALBU 4.2 03/24/2022    CALCIUM 8.7 03/24/2022    BILITOT 0.3 03/24/2022    ALKPHOS 92 03/24/2022    AST 22 03/24/2022    ALT 25 03/24/2022       Radiology: CT ABDOMEN PELVIS W IV CONTRAST Additional Contrast? None    Result Date: 3/24/2022  EXAMINATION: CT ABDOMEN PELVIS W IV CONTRAST HISTORY:  hematuria; right flan k pain  COMPARISON: CT abdomen pelvis from January 14, 2017 TECHNIQUE: Contiguous axial CT sections of the abdomen and pelvis. Imaging was obtained after IV contrast administration. .  Sagittal and coronal reformats have been obtained. All CT scans at this facility use dose modulation, iterative reconstruction, and/or weight based dosing when appropriate to reduce radiation dose to as low as reasonably achievable. FINDINGS This study is mildly limited due to motion artifact.   Lung bases:Visualized lung bases show no significant pathology Liver: The liver is normal in size and enhancement. There are no focal solid or cystic lesions. There is no intra or extrahepatic bile duct dilatation. Gallbladder: No calcified gallstones. Normal gallbladder wall. No pericholecystic fluid. Spleen: There are no focal lesions or calcifications in the spleen. There is no splenomegaly  Pancreas: The pancreas is normal in size and attenuation without focal lesions or dilatation of the pancreatic duct. Adrenal glands are negative. Kidneys: There are no solid renal lesions. There are prompt bilateral nephrograms after IV contrast administration with prompt excretion. There are mildly prominent bilateral renal collecting systems and mildly prominent ureters, hydronephrosis and mild hydroureter. There are no radiopaque stones The urinary bladder contains no radiopaque filling defects. There is mild thickening of the urinary bladder wall measuring up to 10 mm. The prostate gland is prominent and there are heavy calcifications of prostate gland similar to the prior study. Bowel: There is no bowel dilatation or evidence of diverticulitis. Appendix: There  is no CT evidence for appendicitis. Nodes: No lymphadenopathy. Aorta: There is no abdominal aortic aneurysm. Peritoneum: No free fluid or free air. Abdominal wall: There are bilateral inguinal hernias containing fat measuring 2.4 cm on the right and 2.0 cm on the left. Bones : There are no acute osseous changes. Soft tissues: The soft tissues are unremarkable. There is mild bilateral hydronephrosis and hydroureter with thickening of the wall of the urinary bladder. There are no radiopaque renal stones or bladder stones. There is mild hypertrophy of the prostate gland with prostatic calcifications similar to the prior study. The findings may be secondary to bladder outlet obstruction and vesicoureteral reflux.           ASSESSMENT/ PLAN[de-identified]      Principal Problem:    Flank pain  Resolved Problems:    * No resolved hospital problems. *      1. Urinary tract infection   Chronic dysuria and currently complains of right-sided flank pain   History of BPH with bilateral hydronephrosis   Thickening of the wall of the bladder on CT scan consistent with UTI   UA with possible UTI   Continue Rocephin   IV fluids  2. Hematuria   Currently patient is recurrent   Large amount of large decrease in his urine    Followed up with urology in the past-he had persistent abnormal bladder mucosa diffusely   Will consult urology and follow the recommendation  3. Lactic acidosis   Likely due to dehydration   Will hydrate and monitor lactic acid  4. Alcohol abuse and risk of withdrawal   Drinks daily but unknown exact amount   Currently intoxicated   [facility] protocol, thiamine  5. History of hypertension   Resume home medication    Code Status: Full  DVT prophylaxis: Hold due to hematuria       Electronically signed by Rdoger Whitman MD on 3/25/2022 at 12:51 AM      NOTE: This report was transcribed using voice recognition software. Every effort was made to ensure accuracy; however, inadvertent computerized transcription errors may be present.

## 2022-03-26 LAB
ACINETOBACTER CALCOAC BAUMANNII COMPLEX BY PCR: NOT DETECTED
ALBUMIN SERPL-MCNC: 2.9 G/DL (ref 3.5–4.6)
ALP BLD-CCNC: 67 U/L (ref 35–104)
ALT SERPL-CCNC: 42 U/L (ref 0–41)
ANION GAP SERPL CALCULATED.3IONS-SCNC: 14 MEQ/L (ref 9–15)
AST SERPL-CCNC: 67 U/L (ref 0–40)
BACTEROIDES FRAGILIS BY PCR: NOT DETECTED
BILIRUB SERPL-MCNC: 0.3 MG/DL (ref 0.2–0.7)
BUN BLDV-MCNC: 10 MG/DL (ref 8–23)
CALCIUM SERPL-MCNC: 8.1 MG/DL (ref 8.5–9.9)
CANDIDA ALBICANS BY PCR: NOT DETECTED
CANDIDA AURIS BY PCR: NOT DETECTED
CANDIDA GLABRATA BY PCR: NOT DETECTED
CANDIDA KRUSEI BY PCR: NOT DETECTED
CANDIDA PARAPSILOSIS BY PCR: NOT DETECTED
CANDIDA TROPICALIS BY PCR: NOT DETECTED
CHLORIDE BLD-SCNC: 108 MEQ/L (ref 95–107)
CO2: 16 MEQ/L (ref 20–31)
CREAT SERPL-MCNC: 0.73 MG/DL (ref 0.7–1.2)
CRYPTOCOCCUS NEOFORMANS/GATTII BY PCR: NOT DETECTED
ENTEROBACTER CLOACAE COMPLEX BY PCR: NOT DETECTED
ENTEROBACTERALES BY PCR: NOT DETECTED
ENTEROCOCCUS FAECALIS BY PCR: NOT DETECTED
ENTEROCOCCUS FAECIUM BY PCR: NOT DETECTED
ESCHERICHIA COLI BY PCR: NOT DETECTED
GFR AFRICAN AMERICAN: >60
GFR NON-AFRICAN AMERICAN: >60
GLOBULIN: 2.7 G/DL (ref 2.3–3.5)
GLUCOSE BLD-MCNC: 105 MG/DL (ref 70–99)
HAEMOPHILUS INFLUENZAE BY PCR: NOT DETECTED
KLEBSIELLA AEROGENES BY PCR: NOT DETECTED
KLEBSIELLA OXYTOCA BY PCR: NOT DETECTED
KLEBSIELLA PNEUMONIAE GROUP BY PCR: NOT DETECTED
LISTERIA MONOCYTOGENES BY PCR: NOT DETECTED
NEISSERIA MENINGITIDIS BY PCR: NOT DETECTED
ORGANISM: ABNORMAL
POTASSIUM REFLEX MAGNESIUM: 4.1 MEQ/L (ref 3.4–4.9)
PROTEUS SPECIES BY PCR: NOT DETECTED
PSEUDOMONAS AERUGINOSA BY PCR: NOT DETECTED
SALMONELLA SPECIES BY PCR: NOT DETECTED
SERRATIA MARCESCENS BY PCR: NOT DETECTED
SODIUM BLD-SCNC: 138 MEQ/L (ref 135–144)
STAPHYLOCOCCUS AUREUS BY PCR: NOT DETECTED
STAPHYLOCOCCUS EPIDERMIDIS BY PCR: NOT DETECTED
STAPHYLOCOCCUS LUGDUNENSIS BY PCR: NOT DETECTED
STAPHYLOCOCCUS SPECIES BY PCR: NOT DETECTED
STENOTROPHOMONAS MALTOPHILIA BY PCR: NOT DETECTED
STREPTOCOCCUS AGALACTIAE BY PCR: NOT DETECTED
STREPTOCOCCUS PNEUMONIAE BY PCR: NOT DETECTED
STREPTOCOCCUS PYOGENES  BY PCR: NOT DETECTED
STREPTOCOCCUS SPECIES BY PCR: NOT DETECTED
TOTAL PROTEIN: 5.6 G/DL (ref 6.3–8)
URINE CULTURE, ROUTINE: ABNORMAL
URINE CULTURE, ROUTINE: ABNORMAL

## 2022-03-26 PROCEDURE — 6370000000 HC RX 637 (ALT 250 FOR IP): Performed by: UROLOGY

## 2022-03-26 PROCEDURE — 1210000000 HC MED SURG R&B

## 2022-03-26 PROCEDURE — 80053 COMPREHEN METABOLIC PANEL: CPT

## 2022-03-26 PROCEDURE — 2700000000 HC OXYGEN THERAPY PER DAY

## 2022-03-26 PROCEDURE — 87077 CULTURE AEROBIC IDENTIFY: CPT

## 2022-03-26 PROCEDURE — 6370000000 HC RX 637 (ALT 250 FOR IP): Performed by: INTERNAL MEDICINE

## 2022-03-26 PROCEDURE — 87150 DNA/RNA AMPLIFIED PROBE: CPT

## 2022-03-26 PROCEDURE — 6360000002 HC RX W HCPCS: Performed by: INTERNAL MEDICINE

## 2022-03-26 PROCEDURE — 36415 COLL VENOUS BLD VENIPUNCTURE: CPT

## 2022-03-26 PROCEDURE — 2580000003 HC RX 258: Performed by: INTERNAL MEDICINE

## 2022-03-26 RX ADMIN — SODIUM CHLORIDE: 9 INJECTION, SOLUTION INTRAVENOUS at 05:29

## 2022-03-26 RX ADMIN — SODIUM CHLORIDE: 9 INJECTION, SOLUTION INTRAVENOUS at 16:44

## 2022-03-26 RX ADMIN — ACETAMINOPHEN 1000 MG: 500 TABLET ORAL at 00:58

## 2022-03-26 RX ADMIN — TAMSULOSIN HYDROCHLORIDE 0.4 MG: 0.4 CAPSULE ORAL at 09:30

## 2022-03-26 RX ADMIN — THIAMINE HYDROCHLORIDE 100 MG: 100 INJECTION, SOLUTION INTRAMUSCULAR; INTRAVENOUS at 09:30

## 2022-03-26 RX ADMIN — METOPROLOL TARTRATE 25 MG: 25 TABLET, FILM COATED ORAL at 09:30

## 2022-03-26 RX ADMIN — Medication 650 MG: at 09:31

## 2022-03-26 RX ADMIN — AMLODIPINE BESYLATE 10 MG: 10 TABLET ORAL at 09:30

## 2022-03-26 RX ADMIN — KETOROLAC TROMETHAMINE 30 MG: 15 INJECTION, SOLUTION INTRAMUSCULAR; INTRAVENOUS at 15:17

## 2022-03-26 RX ADMIN — METOPROLOL TARTRATE 25 MG: 25 TABLET, FILM COATED ORAL at 21:39

## 2022-03-26 RX ADMIN — LORAZEPAM 2 MG: 2 INJECTION INTRAMUSCULAR at 00:52

## 2022-03-26 RX ADMIN — KETOROLAC TROMETHAMINE 30 MG: 15 INJECTION, SOLUTION INTRAMUSCULAR; INTRAVENOUS at 21:39

## 2022-03-26 RX ADMIN — KETOROLAC TROMETHAMINE 30 MG: 15 INJECTION, SOLUTION INTRAMUSCULAR; INTRAVENOUS at 09:30

## 2022-03-26 RX ADMIN — Medication 10 ML: at 21:41

## 2022-03-26 RX ADMIN — Medication 10 ML: at 09:31

## 2022-03-26 ASSESSMENT — PAIN SCALES - GENERAL
PAINLEVEL_OUTOF10: 0
PAINLEVEL_OUTOF10: 4
PAINLEVEL_OUTOF10: 5
PAINLEVEL_OUTOF10: 4
PAINLEVEL_OUTOF10: 0
PAINLEVEL_OUTOF10: 6
PAINLEVEL_OUTOF10: 5

## 2022-03-26 ASSESSMENT — ENCOUNTER SYMPTOMS
COUGH: 0
DIARRHEA: 0
SHORTNESS OF BREATH: 0
VOMITING: 0
NAUSEA: 0

## 2022-03-26 ASSESSMENT — PAIN DESCRIPTION - PAIN TYPE
TYPE: ACUTE PAIN
TYPE: ACUTE PAIN

## 2022-03-26 ASSESSMENT — PAIN DESCRIPTION - ORIENTATION: ORIENTATION: RIGHT

## 2022-03-26 ASSESSMENT — PAIN DESCRIPTION - LOCATION: LOCATION: FLANK

## 2022-03-26 NOTE — PROGRESS NOTES
3/26/22    From:HOME ALONE     Admit: HEMATURIA, RIGHT FLANK PAIN    PMH:SUBSTANCE ABUSE, HTN, UTI, BPH    Anticipated Discharge Disposition: HOME / [address]  Patient Mobility or PT/OT ordered:  Consults: REENA    Covid result &/or vacc status: X 2 / NEEDS BOOSTER    Barriers to Discharge:CIWA, +UTI, ROCEPHIN, BC--  MAY REQUIRE HOME 02 EVALUATION   Assessments:   CMI AND DCCOP COMPLETED

## 2022-03-26 NOTE — PROGRESS NOTES
Shift assessment complete. Patient A&Ox4. VSS. He complains of pain 5/10. Medication given per [facility]. CIWA scale assessed. Fall and seizure precautions in place. Complete linen change done. Patient denies other needs at this time. Call light in reach.

## 2022-03-26 NOTE — FLOWSHEET NOTE
Patient is  In bed ,he opened his eyes when his name  Was called,his skin is very warm to touch and he is tachycardic with a heart rate 124 to 131,his respirations 28 oral temp. 101.1    20:21 patient was provided with sponge bath,cold wash cloth applied to his forehead,ice pack  Under his armpits and abdomen since his fever climbed to 102.4 degrees fahrenheit. 20:37 patient heart rate is WNL at 95 ,sinus rhythm but fever 102.1 orally,will continue to monitor him very closely.     21:14 patient is sleeping,his vital signs noted and temp is down to 100.0

## 2022-03-26 NOTE — PROGRESS NOTES
Progress Note  Date:3/26/2022       Room:[place] [id number]-  Patient Sho May     YOB: 1960     Age:62 y.o. Subjective    Subjective:  Symptoms:  He reports malaise and weakness. No shortness of breath, cough, chest pain, headache, chest pressure, anorexia, diarrhea or anxiety. Diet:  No nausea or vomiting. Review of Systems   Respiratory: Negative for cough and shortness of breath. Cardiovascular: Negative for chest pain. Gastrointestinal: Negative for anorexia, diarrhea, nausea and vomiting. Neurological: Positive for weakness. Objective         Vitals Last 24 Hours:  TEMPERATURE:  Temp  Av.4 °F (38 °C)  Min: 98.6 °F (37 °C)  Max: 102.4 °F (39.1 °C)  RESPIRATIONS RANGE: Resp  Av.5  Min: 20  Max: 28  PULSE OXIMETRY RANGE: SpO2  Av.6 %  Min: 95 %  Max: 99 %  PULSE RANGE: Pulse  Av.2  Min: 73  Max: 125  BLOOD PRESSURE RANGE: Systolic (38DHT), IRJ:236 , Min:93 , CKH:912   ; Diastolic (87IEL), OYH:14, Min:48, Max:74    I/O (24Hr): Intake/Output Summary (Last 24 hours) at 3/26/2022 1147  Last data filed at 3/26/2022 9770  Gross per 24 hour   Intake 3093.2 ml   Output --   Net 3093.2 ml     Objective:  General Appearance:  Comfortable, well-appearing and in no acute distress. Vital signs: (most recent): Blood pressure (!) 112/52, pulse 119, temperature 98.9 °F (37.2 °C), temperature source Oral, resp. rate 20, height 5' 10\" (1.778 m), weight 199 lb 6.4 oz (90.4 kg), SpO2 99 %. HEENT: Normal HEENT exam.    Lungs:  He is in respiratory distress. Heart: Normal rate. S1 normal and S2 normal.    Abdomen: Abdomen is soft. Bowel sounds are normal.     Extremities: Normal range of motion. Pulses: Distal pulses are intact. Neurological: Patient is alert. Pupils:  Pupils are equal, round, and reactive to light. Skin:  Warm.       Labs/Imaging/Diagnostics    Labs:  CBC:  Recent Labs     22  1915 22  0603   WBC 5.7 13.4*   RBC 5.01 4.17*   HGB 15.7 12.9*   HCT 47.1 38.5*   MCV 93.9 92.3   RDW 14.2 14.4    191     CHEMISTRIES:  Recent Labs     03/24/22 1915 03/25/22  0603 03/26/22  0543    137 138   K 3.9 4.1 4.1    105 108*   CO2 17* 18* 16*   BUN 12 8 10   CREATININE 0.92 0.68* 0.73   GLUCOSE 113* 107* 105*     PT/INR:No results for input(s): PROTIME, INR in the last 72 hours. APTT:No results for input(s): APTT in the last 72 hours. LIVER PROFILE:  Recent Labs     03/24/22 1915 03/25/22  0603 03/26/22  0543   AST 22 42* 67*   ALT 25 27 42*   BILITOT 0.3 0.4 0.3   ALKPHOS 92 58 67       Imaging Last 24 Hours:  CT ABDOMEN PELVIS W IV CONTRAST Additional Contrast? None    Result Date: 3/24/2022  EXAMINATION: CT ABDOMEN PELVIS W IV CONTRAST HISTORY:  hematuria; right flan k pain  COMPARISON: CT abdomen pelvis from January 14, 2017 TECHNIQUE: Contiguous axial CT sections of the abdomen and pelvis. Imaging was obtained after IV contrast administration. .  Sagittal and coronal reformats have been obtained. All CT scans at this facility use dose modulation, iterative reconstruction, and/or weight based dosing when appropriate to reduce radiation dose to as low as reasonably achievable. FINDINGS This study is mildly limited due to motion artifact. Lung bases:Visualized lung bases show no significant pathology Liver: The liver is normal in size and enhancement. There are no focal solid or cystic lesions. There is no intra or extrahepatic bile duct dilatation. Gallbladder: No calcified gallstones. Normal gallbladder wall. No pericholecystic fluid. Spleen: There are no focal lesions or calcifications in the spleen. There is no splenomegaly  Pancreas: The pancreas is normal in size and attenuation without focal lesions or dilatation of the pancreatic duct. Adrenal glands are negative. Kidneys: There are no solid renal lesions. There are prompt bilateral nephrograms after IV contrast administration with prompt excretion.  There are mildly prominent bilateral renal collecting systems and mildly prominent ureters, hydronephrosis and mild hydroureter. There are no radiopaque stones The urinary bladder contains no radiopaque filling defects. There is mild thickening of the urinary bladder wall measuring up to 10 mm. The prostate gland is prominent and there are heavy calcifications of prostate gland similar to the prior study. Bowel: There is no bowel dilatation or evidence of diverticulitis. Appendix: There  is no CT evidence for appendicitis. Nodes: No lymphadenopathy. Aorta: There is no abdominal aortic aneurysm. Peritoneum: No free fluid or free air. Abdominal wall: There are bilateral inguinal hernias containing fat measuring 2.4 cm on the right and 2.0 cm on the left. Bones : There are no acute osseous changes. Soft tissues: The soft tissues are unremarkable. There is mild bilateral hydronephrosis and hydroureter with thickening of the wall of the urinary bladder. There are no radiopaque renal stones or bladder stones. There is mild hypertrophy of the prostate gland with prostatic calcifications similar to the prior study. The findings may be secondary to bladder outlet obstruction and vesicoureteral reflux. Assessment//Plan           Hospital Problems           Last Modified POA    * (Principal) Flank pain 3/24/2022 Yes    Acute cystitis with hematuria 3/25/2022 Yes      mild bilateral hydronephrosis and hydroureter with thickening of the wall of the urinary bladder. Cystitis with hematuria  Etoh abuse with withdrawal  HTN  Assessment & Plan  3/26: Patient was seen and evaluated. C/w current care, FU abx, c/w flomax. FU cultures sensitivity. Fall precuations.   Electronically signed by Davida Lomas MD on 3/26/22 at 11:47 AM EDT

## 2022-03-27 LAB
ALBUMIN SERPL-MCNC: 2.9 G/DL (ref 3.5–4.6)
ALP BLD-CCNC: 104 U/L (ref 35–104)
ALT SERPL-CCNC: 69 U/L (ref 0–41)
ANION GAP SERPL CALCULATED.3IONS-SCNC: 12 MEQ/L (ref 9–15)
AST SERPL-CCNC: 77 U/L (ref 0–40)
BILIRUB SERPL-MCNC: 0.3 MG/DL (ref 0.2–0.7)
BUN BLDV-MCNC: 8 MG/DL (ref 8–23)
CALCIUM SERPL-MCNC: 7.7 MG/DL (ref 8.5–9.9)
CHLORIDE BLD-SCNC: 105 MEQ/L (ref 95–107)
CO2: 20 MEQ/L (ref 20–31)
CREAT SERPL-MCNC: 0.65 MG/DL (ref 0.7–1.2)
GFR AFRICAN AMERICAN: >60
GFR NON-AFRICAN AMERICAN: >60
GLOBULIN: 2.6 G/DL (ref 2.3–3.5)
GLUCOSE BLD-MCNC: 100 MG/DL (ref 70–99)
GLUCOSE BLD-MCNC: 101 MG/DL (ref 70–99)
PERFORMED ON: ABNORMAL
POTASSIUM REFLEX MAGNESIUM: 3.7 MEQ/L (ref 3.4–4.9)
SODIUM BLD-SCNC: 137 MEQ/L (ref 135–144)
TOTAL PROTEIN: 5.5 G/DL (ref 6.3–8)

## 2022-03-27 PROCEDURE — 36415 COLL VENOUS BLD VENIPUNCTURE: CPT

## 2022-03-27 PROCEDURE — 99222 1ST HOSP IP/OBS MODERATE 55: CPT | Performed by: INTERNAL MEDICINE

## 2022-03-27 PROCEDURE — 2580000003 HC RX 258: Performed by: INTERNAL MEDICINE

## 2022-03-27 PROCEDURE — 6360000002 HC RX W HCPCS: Performed by: INTERNAL MEDICINE

## 2022-03-27 PROCEDURE — 6370000000 HC RX 637 (ALT 250 FOR IP): Performed by: UROLOGY

## 2022-03-27 PROCEDURE — 6370000000 HC RX 637 (ALT 250 FOR IP): Performed by: INTERNAL MEDICINE

## 2022-03-27 PROCEDURE — 1210000000 HC MED SURG R&B

## 2022-03-27 PROCEDURE — 87040 BLOOD CULTURE FOR BACTERIA: CPT

## 2022-03-27 PROCEDURE — 99232 SBSQ HOSP IP/OBS MODERATE 35: CPT | Performed by: UROLOGY

## 2022-03-27 PROCEDURE — 80053 COMPREHEN METABOLIC PANEL: CPT

## 2022-03-27 RX ORDER — HALOPERIDOL 5 MG/ML
2 INJECTION INTRAMUSCULAR EVERY 6 HOURS PRN
Status: DISCONTINUED | OUTPATIENT
Start: 2022-03-27 | End: 2022-03-30 | Stop reason: HOSPADM

## 2022-03-27 RX ADMIN — CEFTRIAXONE SODIUM 1000 MG: 1 INJECTION, POWDER, FOR SOLUTION INTRAMUSCULAR; INTRAVENOUS at 00:29

## 2022-03-27 RX ADMIN — THIAMINE HYDROCHLORIDE 100 MG: 100 INJECTION, SOLUTION INTRAMUSCULAR; INTRAVENOUS at 09:02

## 2022-03-27 RX ADMIN — LORAZEPAM 4 MG: 2 INJECTION INTRAMUSCULAR; INTRAVENOUS at 23:02

## 2022-03-27 RX ADMIN — METOPROLOL TARTRATE 25 MG: 25 TABLET, FILM COATED ORAL at 09:01

## 2022-03-27 RX ADMIN — Medication 10 ML: at 21:10

## 2022-03-27 RX ADMIN — TAMSULOSIN HYDROCHLORIDE 0.4 MG: 0.4 CAPSULE ORAL at 09:01

## 2022-03-27 RX ADMIN — AMLODIPINE BESYLATE 10 MG: 10 TABLET ORAL at 09:02

## 2022-03-27 RX ADMIN — VANCOMYCIN HYDROCHLORIDE 1000 MG: 1 INJECTION, POWDER, LYOPHILIZED, FOR SOLUTION INTRAVENOUS at 20:37

## 2022-03-27 RX ADMIN — SODIUM CHLORIDE: 9 INJECTION, SOLUTION INTRAVENOUS at 07:01

## 2022-03-27 RX ADMIN — VANCOMYCIN HYDROCHLORIDE 1750 MG: 500 INJECTION, POWDER, LYOPHILIZED, FOR SOLUTION INTRAVENOUS at 12:18

## 2022-03-27 RX ADMIN — LORAZEPAM 4 MG: 2 INJECTION INTRAMUSCULAR; INTRAVENOUS at 08:52

## 2022-03-27 RX ADMIN — Medication 10 ML: at 09:02

## 2022-03-27 RX ADMIN — METOPROLOL TARTRATE 25 MG: 25 TABLET, FILM COATED ORAL at 21:24

## 2022-03-27 ASSESSMENT — PAIN SCALES - GENERAL: PAINLEVEL_OUTOF10: 0

## 2022-03-27 ASSESSMENT — ENCOUNTER SYMPTOMS
SHORTNESS OF BREATH: 0
DIARRHEA: 0
ABDOMINAL DISTENTION: 1
NAUSEA: 0
EYES NEGATIVE: 1
VOMITING: 0
ABDOMINAL PAIN: 0
COUGH: 0

## 2022-03-27 NOTE — PROGRESS NOTES
Progress Note    3/27/2022   8:15 AM    Name:  Loida Alas  MRN:    [id number]     Acct:     [de-identified]   Room:  [facility]/[address] Day: 3     Admit Date: 3/24/2022  6:36 PM  PCP: Livan Solano MD    Subjective:     C/C:   Chief Complaint   Patient presents with    Flank Pain     pt c/o right flank pain, hematuria, and dysuria x10 days       Interval History: Status: This is a  62-year-old male with history of hypertension, BPH, chronic back pain, substance abuse, alcohol abuse, urinary incontinence, who was having dysuria and difficulty urinating for about 10 days with some right flank pain and noticed some hematuria and was seen through the emergency room. He was seen at [facility] in 6/17 for a ulceration of the bladder and had negative biospies and negative bilateral retrogrades at the time. He had a prior h/o transverse myelitis that resolved but was performing CIC at that time. He was diagnosed with a UTI on this admission and the pain and symptoms have improved on IV Rocephin. Past Medical History:   Diagnosis Date    Abnormal finding on EKG 7/11/2017    Chronic back pain     Erectile dysfunction     HTN (hypertension) 1/13/2017    Sleep apnea     Substance abuse ([facility] [address]     Urinary incontinence        ROS:  Review of Systems   Constitutional: Negative for fever. Gastrointestinal: Negative for abdominal pain. Genitourinary: Negative for hematuria. Medications: Allergies:    Allergies   Allergen Reactions    Statins     Proscar [Finasteride] Hives and Swelling       Current Meds: vancomycin 1000 mg IVPB in 250 mL D5W addavial, Q12H  sodium chloride flush 0.9 % injection 5-40 mL, 2 times per day  sodium chloride flush 0.9 % injection 5-40 mL, PRN  0.9 % sodium chloride infusion, PRN  ondansetron (ZOFRAN-ODT) disintegrating tablet 4 mg, Q8H PRN   Or  ondansetron (ZOFRAN) injection 4 mg, Q6H PRN  polyethylene glycol (GLYCOLAX) packet 17 g, Daily PRN  acetaminophen (TYLENOL) tablet 650 mg, Q6H PRN   Or  acetaminophen (TYLENOL) suppository 650 mg, Q6H PRN  amLODIPine (NORVASC) tablet 10 mg, Daily  metoprolol tartrate (LOPRESSOR) tablet 25 mg, BID  0.9 % sodium chloride infusion, Continuous  LORazepam (ATIVAN) tablet 1 mg, Q1H PRN   Or  LORazepam (ATIVAN) injection 1 mg, Q1H PRN   Or  LORazepam (ATIVAN) tablet 2 mg, Q1H PRN   Or  LORazepam (ATIVAN) injection 2 mg, Q1H PRN   Or  LORazepam (ATIVAN) tablet 3 mg, Q1H PRN   Or  LORazepam (ATIVAN) injection 3 mg, Q1H PRN   Or  LORazepam (ATIVAN) tablet 4 mg, Q1H PRN   Or  LORazepam (ATIVAN) injection 4 mg, Q1H PRN  thiamine (B-1) injection 100 mg, Daily  cefTRIAXone (ROCEPHIN) 1000 mg IVPB in 50 mL D5W minibag, Q24H  tamsulosin (FLOMAX) capsule 0.4 mg, Daily        Data:     Code Status:  Full Code    History reviewed. No pertinent family history. Social History     Socioeconomic History    Marital status: Single     Spouse name: Not on file    Number of children: Not on file    Years of education: Not on file    Highest education level: Not on file   Occupational History    Not on file   Tobacco Use    Smoking status: Former Smoker     Packs/day: 1.00     Years: 20.00     Pack years: 20.00     Types: Cigarettes     Quit date: 12/15/2021     Years since quittin.2    Smokeless tobacco: Never Used   Substance and Sexual Activity    Alcohol use:  Yes     Alcohol/week: 5.0 standard drinks     Types: 5 Shots of liquor per week     Comment: socially    Drug use: Yes     Frequency: 1.0 times per week     Types: Marijuana Dolly Maki     Comment: daily    Sexual activity: Not on file   Other Topics Concern    Not on file   Social History Narrative    Not on file     Social Determinants of Health     Financial Resource Strain: Low Risk     Difficulty of Paying Living Expenses: Not very hard   Food Insecurity: No Food Insecurity    Worried About Running Out of Food in the Last Year: Never true    Ching of Food in the Last Year: Never true   Transportation Needs:     Lack of Transportation (Medical): Not on file    Lack of Transportation (Non-Medical): Not on file   Physical Activity:     Days of Exercise per Week: Not on file    Minutes of Exercise per Session: Not on file   Stress:     Feeling of Stress : Not on file   Social Connections:     Frequency of Communication with Friends and Family: Not on file    Frequency of Social Gatherings with Friends and Family: Not on file    Attends [employer] Services: Not on file    Active Member of [address] [place] [address] or Organizations: Not on file    Attends Club or Organization Meetings: Not on file    Marital Status: Not on file   Intimate Partner Violence:     Fear of Current or Ex-Partner: Not on file    Emotionally Abused: Not on file    Physically Abused: Not on file    Sexually Abused: Not on file   Housing Stability:     Unable to Pay for Housing in the Last Year: Not on file    Number of [place] in the Last Year: Not on file    Unstable Housing in the Last Year: Not on file       Vitals:  BP (!) 101/56   Pulse 104   Temp 98.8 °F (37.1 °C) (Oral)   Resp 20   Ht 5' 10\" (1.778 m)   Wt 199 lb 6.4 oz (90.4 kg)   SpO2 95%   BMI 28.61 kg/m²   Temp (24hrs), Av.8 °F (37.1 °C), Min:97.7 °F (36.5 °C), Max:100 °F (37.8 °C)    No results for input(s): POCGLU in the last 72 hours. I/O(24Hr):   No intake or output data in the 24 hours ending 22 0815    Labs:  Hematology:  Recent Labs     22  0603   WBC 13.4*   HGB 12.9*   HCT 38.5*        Chemistry:  Recent Labs     22  0517      K 3.7      CO2 20   GLUCOSE 100*   BUN 8   CREATININE 0.65*   ANIONGAP 12   LABGLOM >60.0   GFRAA >60.0   CALCIUM 7.7*       Urine Culture  Lab Results   Component Value Date    LABURIN  2022     Performed at [facility], [address] [place] [address]  ([address] >[id number] CFU/ML 2022         Physical Examination:        Physical Exam  Abdominal:      General: There is no distension. Neurological:      Mental Status: He is alert. Psychiatric:         Mood and Affect: Mood normal.         Assessment:        Primary Problem   This is a  46-year-old male with history of hypertension, BPH, chronic back pain, substance abuse, alcohol abuse, urinary incontinence, who was having dysuria and difficulty urinating for about 10 days with some right flank pain and noticed some hematuria and was diagnosed with a UTI and has clinically improved on Rocephin and Flomax. Would continue with present management. Flank pain     Active Hospital Problems    Diagnosis Date Noted    Acute cystitis with hematuria [N30.01]     Flank pain [R10.9] 03/24/2022         Plan:        1. Continue Flomax daily on D/C, will need 90 days supply with 3 refills  2.  Continue with Rocephin and on D/C will need 10 days of antibiotics total for current UTI      Electronically signed by Aleksandra Pena MD on 3/27/2022 at 8:15 AM

## 2022-03-27 NOTE — PROGRESS NOTES
Shift assessment complete. Patient A&Ox4. VSS. He is verbally aggressive and agitated this morning. CIWA scale assessed. Medication given per [facility]. Fall and seizure precautions in place. Call light in reach.

## 2022-03-27 NOTE — CONSULTS
Infectious Disease     Patient Name: Hieu Marinelli  Date: 3/27/2022  YOB: 1960  Medical Record Number: [id number]        Enterococcus faecalis sepsis  Pyelonephritis    History of Present Illness:  Chronic back pain erectile dysfunction hypertension sleep apnea urinary incontinence substance abuse      Patient presents with right flank pain hematuria dysuria ongoing for 10 days    Patient reported to be intoxicated on presentation to the emergency room  On 3/24/2021    Blood cultures from 3/24/2021 2 sets are growing gram-positive cocci    Molecular testing could not given identification  Repeat blood cultures were drawn today    Urine culture growing Enterococcus faecalis more than 100,000 colonies  Urinalysis on admission showed 20-50 white cells per high-power field  red blood cells per high-power field    EXAMINATION: CT ABDOMEN PELVIS W IV CONTRAST        HISTORY:  hematuria; right flan k pain         COMPARISON: CT abdomen pelvis from January 14, 2017       TECHNIQUE: Contiguous axial CT sections of the abdomen and pelvis.  Imaging was obtained after IV contrast administration. .     Sagittal and coronal reformats have been obtained. All CT scans at this facility use dose modulation, iterative reconstruction, and/or weight based dosing when appropriate to reduce radiation dose to as low as reasonably achievable.       FINDINGS   This study is mildly limited due to motion artifact.       Lung bases:Visualized lung bases show no significant pathology       Liver: The liver is normal in size and enhancement. There are no focal solid or cystic lesions. There is no intra or extrahepatic bile duct dilatation.       Gallbladder: No calcified gallstones. Normal gallbladder wall. No pericholecystic fluid.            Spleen: There are no focal lesions or calcifications in the spleen. There is no splenomegaly        Pancreas:  The pancreas is normal in size and attenuation without focal lesions or dilatation of the pancreatic duct. Adrenal glands are negative.       Kidneys: There are no solid renal lesions. There are prompt bilateral nephrograms after IV contrast administration with prompt excretion. There are mildly prominent bilateral renal collecting systems and mildly prominent ureters, hydronephrosis and mild    hydroureter. There are no radiopaque stones       The urinary bladder contains no radiopaque filling defects. There is mild thickening of the urinary bladder wall measuring up to 10 mm. The prostate gland is prominent and there are heavy calcifications of prostate gland similar to the prior study.       Bowel:  There is no bowel dilatation or evidence of diverticulitis.         Appendix: There  is no CT evidence for appendicitis.         Nodes: No lymphadenopathy.        Aorta: There is no abdominal aortic aneurysm.         Peritoneum: No free fluid or free air.         Abdominal wall: There are bilateral inguinal hernias containing fat measuring 2.4 cm on the right and 2.0 cm on the left.       Bones : There are no acute osseous changes.       Soft tissues: The soft tissues are unremarkable.                Impression   There is mild bilateral hydronephrosis and hydroureter with thickening of the wall of the urinary bladder. There are no radiopaque renal stones or bladder stones. There is mild hypertrophy of the prostate gland with prostatic calcifications similar to    the prior study. The findings may be secondary to bladder outlet obstruction and vesicoureteral reflux. Review of Systems   Constitutional: Negative for chills, diaphoresis, fatigue and fever. HENT: Negative. Eyes: Negative. Cardiovascular: Negative. Gastrointestinal: Positive for abdominal distention. Negative for diarrhea, nausea and vomiting. Endocrine: Negative. Genitourinary: Positive for dysuria, flank pain, frequency and urgency. Neurological: Negative. Hematological: Negative. Review of Systems: All 14 review of systems negative other than as stated above    Social History     Tobacco Use    Smoking status: Former Smoker     Packs/day: 1.00     Years: 20.00     Pack years: 20.00     Types: Cigarettes     Quit date: 12/15/2021     Years since quittin.2    Smokeless tobacco: Never Used   Substance Use Topics    Alcohol use: Yes     Alcohol/week: 5.0 standard drinks     Types: 5 Shots of liquor per week     Comment: socially    Drug use: Yes     Frequency: 1.0 times per week     Types: Marijuana Ardia Wake)     Comment: daily         Past Medical History:   Diagnosis Date    Abnormal finding on EKG 2017    Chronic back pain     Erectile dysfunction     HTN (hypertension) 2017    Sleep apnea     Substance abuse ([facility] [address]     Urinary incontinence            Past Surgical History:   Procedure Laterality Date    CYSTOSCOPY  2017    [facility] SKIM MD  BILATERAL RETROGRADE PYELOGRAMS BLADDER BX FULGURATION    FRACTURE SURGERY      LEG SURGERY Bilateral     screws and plates in both after broken          No current facility-administered medications on file prior to encounter. Current Outpatient Medications on File Prior to Encounter   Medication Sig Dispense Refill    amLODIPine (NORVASC) 10 MG tablet take 1 tablet by mouth once daily 90 tablet 3    metoprolol tartrate (LOPRESSOR) 25 MG tablet take 1 tablet by mouth twice a day 180 tablet 3       Allergies   Allergen Reactions    Statins     Proscar [Finasteride] Hives and Swelling         History reviewed. No pertinent family history. Physical Exam:      Physical Exam  Constitutional:       General: He is not in acute distress. Appearance: He is normal weight. He is not ill-appearing. HENT:      Nose: No congestion or rhinorrhea. Eyes:      Extraocular Movements: Extraocular movements intact. Pupils: Pupils are equal, round, and reactive to light.    Cardiovascular:      Heart sounds: Normal heart sounds. No murmur heard. Pulmonary:      Effort: Pulmonary effort is normal. No respiratory distress. Breath sounds: Normal breath sounds. No wheezing, rhonchi or rales. Chest:      Chest wall: No tenderness. Abdominal:      General: There is no distension. Palpations: There is no mass. Tenderness: There is no abdominal tenderness. There is no right CVA tenderness, left CVA tenderness, guarding or rebound. Hernia: No hernia is present. Musculoskeletal:         General: No swelling. Cervical back: Neck supple. No rigidity or tenderness. Skin:     General: Skin is warm. Coloration: Skin is not jaundiced or pale. Findings: No bruising or erythema. Neurological:      General: No focal deficit present. Mental Status: He is alert. Blood pressure (!) 101/56, pulse 104, temperature 98.8 °F (37.1 °C), temperature source Oral, resp. rate 20, height 5' 10\" (1.778 m), weight 199 lb 6.4 oz (90.4 kg), SpO2 95 %.       .   Lab Results   Component Value Date    WBC 13.4 (H) 03/25/2022    HGB 12.9 (L) 03/25/2022    HCT 38.5 (L) 03/25/2022    MCV 92.3 03/25/2022     03/25/2022     Lab Results   Component Value Date     03/27/2022    K 3.7 03/27/2022     03/27/2022    CO2 20 03/27/2022    BUN 8 03/27/2022    CREATININE 0.65 03/27/2022    GLUCOSE 100 03/27/2022    GLUCOSE 157 10/19/2011    CALCIUM 7.7 03/27/2022                ASSESSMENT:  Patient Active Problem List   Diagnosis    Hematuria    Urinary obstruction    HTN (hypertension)    Abnormal finding on EKG    Flank pain    Acute cystitis with hematuria         PLAN:  Enterococcus faecalis sepsis    Pyelonephritis with Enterococcus faecalis    Most likely blood cultures growing Enterococcus as well    Continue vancomycin at this time discontinue Rocephin

## 2022-03-27 NOTE — PROGRESS NOTES
Progress Note  Date:3/27/2022       Room:[place] [id number]  Patient Howard Schmitz     YOB: 1960     Age:62 y.o. Subjective    Subjective:  Symptoms:  He reports malaise and weakness. No shortness of breath, cough, chest pain, headache, chest pressure, anorexia, diarrhea or anxiety. Diet:  No nausea or vomiting. Review of Systems   Respiratory: Negative for cough and shortness of breath. Cardiovascular: Negative for chest pain. Gastrointestinal: Negative for anorexia, diarrhea, nausea and vomiting. Neurological: Positive for weakness. Objective         Vitals Last 24 Hours:  TEMPERATURE:  Temp  Av.3 °F (36.8 °C)  Min: 97.7 °F (36.5 °C)  Max: 98.8 °F (37.1 °C)  RESPIRATIONS RANGE: Resp  Av  Min: 20  Max: 20  PULSE OXIMETRY RANGE: SpO2  Av %  Min: 95 %  Max: 95 %  PULSE RANGE: Pulse  Av  Min: 104  Max: 104  BLOOD PRESSURE RANGE: Systolic (15UVR), XEM:500 , Min:101 , DQR:792   ; Diastolic (89NVL), KXA:12, Min:56, Max:56    I/O (24Hr): No intake or output data in the 24 hours ending 22 1025  Objective:  General Appearance:  Comfortable, well-appearing and in no acute distress. Vital signs: (most recent): Blood pressure (!) 101/56, pulse 104, temperature 98.8 °F (37.1 °C), temperature source Oral, resp. rate 20, height 5' 10\" (1.778 m), weight 199 lb 6.4 oz (90.4 kg), SpO2 95 %. HEENT: Normal HEENT exam.    Lungs:  He is in respiratory distress. Heart: Normal rate. S1 normal and S2 normal.    Abdomen: Abdomen is soft. Bowel sounds are normal.     Extremities: Normal range of motion. Pulses: Distal pulses are intact. Neurological: Patient is alert. Pupils:  Pupils are equal, round, and reactive to light. Skin:  Warm.       Labs/Imaging/Diagnostics    Labs:  CBC:  Recent Labs     22  1915 22  0603   WBC 5.7 13.4*   RBC 5.01 4.17*   HGB 15.7 12.9*   HCT 47.1 38.5*   MCV 93.9 92.3   RDW 14.2 14.4    191 CHEMISTRIES:  Recent Labs     03/25/22  0603 03/26/22  0543 03/27/22  0517    138 137   K 4.1 4.1 3.7    108* 105   CO2 18* 16* 20   BUN 8 10 8   CREATININE 0.68* 0.73 0.65*   GLUCOSE 107* 105* 100*     PT/INR:No results for input(s): PROTIME, INR in the last 72 hours. APTT:No results for input(s): APTT in the last 72 hours. LIVER PROFILE:  Recent Labs     03/25/22  0603 03/26/22  0543 03/27/22  0517   AST 42* 67* 77*   ALT 27 42* 69*   BILITOT 0.4 0.3 0.3   ALKPHOS 58 67 104       Imaging Last 24 Hours:  CT ABDOMEN PELVIS W IV CONTRAST Additional Contrast? None    Result Date: 3/24/2022  EXAMINATION: CT ABDOMEN PELVIS W IV CONTRAST HISTORY:  hematuria; right flan k pain  COMPARISON: CT abdomen pelvis from January 14, 2017 TECHNIQUE: Contiguous axial CT sections of the abdomen and pelvis. Imaging was obtained after IV contrast administration. .  Sagittal and coronal reformats have been obtained. All CT scans at this facility use dose modulation, iterative reconstruction, and/or weight based dosing when appropriate to reduce radiation dose to as low as reasonably achievable. FINDINGS This study is mildly limited due to motion artifact. Lung bases:Visualized lung bases show no significant pathology Liver: The liver is normal in size and enhancement. There are no focal solid or cystic lesions. There is no intra or extrahepatic bile duct dilatation. Gallbladder: No calcified gallstones. Normal gallbladder wall. No pericholecystic fluid. Spleen: There are no focal lesions or calcifications in the spleen. There is no splenomegaly  Pancreas: The pancreas is normal in size and attenuation without focal lesions or dilatation of the pancreatic duct. Adrenal glands are negative. Kidneys: There are no solid renal lesions. There are prompt bilateral nephrograms after IV contrast administration with prompt excretion.  There are mildly prominent bilateral renal collecting systems and mildly prominent ureters, hydronephrosis and mild hydroureter. There are no radiopaque stones The urinary bladder contains no radiopaque filling defects. There is mild thickening of the urinary bladder wall measuring up to 10 mm. The prostate gland is prominent and there are heavy calcifications of prostate gland similar to the prior study. Bowel: There is no bowel dilatation or evidence of diverticulitis. Appendix: There  is no CT evidence for appendicitis. Nodes: No lymphadenopathy. Aorta: There is no abdominal aortic aneurysm. Peritoneum: No free fluid or free air. Abdominal wall: There are bilateral inguinal hernias containing fat measuring 2.4 cm on the right and 2.0 cm on the left. Bones : There are no acute osseous changes. Soft tissues: The soft tissues are unremarkable. There is mild bilateral hydronephrosis and hydroureter with thickening of the wall of the urinary bladder. There are no radiopaque renal stones or bladder stones. There is mild hypertrophy of the prostate gland with prostatic calcifications similar to the prior study. The findings may be secondary to bladder outlet obstruction and vesicoureteral reflux. Assessment//Plan           Hospital Problems           Last Modified POA    * (Principal) Flank pain 3/24/2022 Yes    Acute cystitis with hematuria 3/25/2022 Yes      mild bilateral hydronephrosis and hydroureter with thickening of the wall of the urinary bladder. Cystitis with hematuria  Etoh abuse with withdrawal  HTN  Staph bacteremia  Assessment & Plan    3/26: Patient was seen and evaluated. C/w current care, FU abx, c/w flomax. FU cultures sensitivity. Fall precuations. 3/27: consult ID, repeat Blood cx, add vanc, pharmacy to dose, c/w CIWA, protocol, agitated, we will consider haldol if needed.   Electronically signed by Jessica Marvin MD on 3/26/22 at 11:47 AM EDT

## 2022-03-28 ENCOUNTER — APPOINTMENT (OUTPATIENT)
Dept: ULTRASOUND IMAGING | Age: 62
DRG: 872 | End: 2022-03-28
Payer: MEDICARE

## 2022-03-28 LAB
ALBUMIN SERPL-MCNC: 3.4 G/DL (ref 3.5–4.6)
ALP BLD-CCNC: 139 U/L (ref 35–104)
ALT SERPL-CCNC: 94 U/L (ref 0–41)
ANION GAP SERPL CALCULATED.3IONS-SCNC: 14 MEQ/L (ref 9–15)
AST SERPL-CCNC: 84 U/L (ref 0–40)
BILIRUB SERPL-MCNC: 0.4 MG/DL (ref 0.2–0.7)
BUN BLDV-MCNC: 7 MG/DL (ref 8–23)
CALCIUM SERPL-MCNC: 8.2 MG/DL (ref 8.5–9.9)
CHLORIDE BLD-SCNC: 104 MEQ/L (ref 95–107)
CO2: 18 MEQ/L (ref 20–31)
CREAT SERPL-MCNC: 0.64 MG/DL (ref 0.7–1.2)
CULTURE, BLOOD 2: ABNORMAL
GFR AFRICAN AMERICAN: >60
GFR NON-AFRICAN AMERICAN: >60
GLOBULIN: 2.8 G/DL (ref 2.3–3.5)
GLUCOSE BLD-MCNC: 139 MG/DL (ref 70–99)
INR BLD: 0.9
POTASSIUM REFLEX MAGNESIUM: 3.8 MEQ/L (ref 3.4–4.9)
PROTHROMBIN TIME: 12.6 SEC (ref 12.3–14.9)
SODIUM BLD-SCNC: 136 MEQ/L (ref 135–144)
TOTAL PROTEIN: 6.2 G/DL (ref 6.3–8)

## 2022-03-28 PROCEDURE — 6360000002 HC RX W HCPCS: Performed by: INTERNAL MEDICINE

## 2022-03-28 PROCEDURE — 80074 ACUTE HEPATITIS PANEL: CPT

## 2022-03-28 PROCEDURE — 6370000000 HC RX 637 (ALT 250 FOR IP): Performed by: UROLOGY

## 2022-03-28 PROCEDURE — 2580000003 HC RX 258: Performed by: INTERNAL MEDICINE

## 2022-03-28 PROCEDURE — 97166 OT EVAL MOD COMPLEX 45 MIN: CPT

## 2022-03-28 PROCEDURE — 99222 1ST HOSP IP/OBS MODERATE 55: CPT | Performed by: INTERNAL MEDICINE

## 2022-03-28 PROCEDURE — 1210000000 HC MED SURG R&B

## 2022-03-28 PROCEDURE — 6370000000 HC RX 637 (ALT 250 FOR IP): Performed by: INTERNAL MEDICINE

## 2022-03-28 PROCEDURE — 80053 COMPREHEN METABOLIC PANEL: CPT

## 2022-03-28 PROCEDURE — 36415 COLL VENOUS BLD VENIPUNCTURE: CPT

## 2022-03-28 PROCEDURE — 85610 PROTHROMBIN TIME: CPT

## 2022-03-28 PROCEDURE — 97162 PT EVAL MOD COMPLEX 30 MIN: CPT

## 2022-03-28 PROCEDURE — 93971 EXTREMITY STUDY: CPT

## 2022-03-28 PROCEDURE — 6360000002 HC RX W HCPCS: Performed by: FAMILY MEDICINE

## 2022-03-28 PROCEDURE — 99232 SBSQ HOSP IP/OBS MODERATE 35: CPT | Performed by: INTERNAL MEDICINE

## 2022-03-28 RX ORDER — KETOROLAC TROMETHAMINE 15 MG/ML
30 INJECTION, SOLUTION INTRAMUSCULAR; INTRAVENOUS ONCE
Status: COMPLETED | OUTPATIENT
Start: 2022-03-28 | End: 2022-03-28

## 2022-03-28 RX ADMIN — LORAZEPAM 2 MG: 2 INJECTION INTRAMUSCULAR at 08:42

## 2022-03-28 RX ADMIN — AMLODIPINE BESYLATE 10 MG: 10 TABLET ORAL at 08:42

## 2022-03-28 RX ADMIN — AMPICILLIN SODIUM 2000 MG: 2 INJECTION, POWDER, FOR SOLUTION INTRAVENOUS at 23:14

## 2022-03-28 RX ADMIN — LORAZEPAM 1 MG: 1 TABLET ORAL at 21:01

## 2022-03-28 RX ADMIN — AMPICILLIN SODIUM 2000 MG: 2 INJECTION, POWDER, FOR SOLUTION INTRAVENOUS at 17:07

## 2022-03-28 RX ADMIN — SODIUM CHLORIDE: 9 INJECTION, SOLUTION INTRAVENOUS at 06:13

## 2022-03-28 RX ADMIN — METOPROLOL TARTRATE 25 MG: 25 TABLET, FILM COATED ORAL at 21:01

## 2022-03-28 RX ADMIN — METOPROLOL TARTRATE 25 MG: 25 TABLET, FILM COATED ORAL at 08:42

## 2022-03-28 RX ADMIN — KETOROLAC TROMETHAMINE 30 MG: 15 INJECTION, SOLUTION INTRAMUSCULAR; INTRAVENOUS at 18:33

## 2022-03-28 RX ADMIN — THIAMINE HYDROCHLORIDE 100 MG: 100 INJECTION, SOLUTION INTRAMUSCULAR; INTRAVENOUS at 08:42

## 2022-03-28 RX ADMIN — Medication 10 ML: at 08:43

## 2022-03-28 RX ADMIN — VANCOMYCIN HYDROCHLORIDE 1000 MG: 1 INJECTION, POWDER, LYOPHILIZED, FOR SOLUTION INTRAVENOUS at 11:51

## 2022-03-28 RX ADMIN — TAMSULOSIN HYDROCHLORIDE 0.4 MG: 0.4 CAPSULE ORAL at 08:42

## 2022-03-28 RX ADMIN — VANCOMYCIN HYDROCHLORIDE 1000 MG: 1 INJECTION, POWDER, LYOPHILIZED, FOR SOLUTION INTRAVENOUS at 03:41

## 2022-03-28 ASSESSMENT — ENCOUNTER SYMPTOMS
COUGH: 0
VOMITING: 0
DIARRHEA: 0
ABDOMINAL DISTENTION: 0
SHORTNESS OF BREATH: 0
NAUSEA: 0

## 2022-03-28 ASSESSMENT — PAIN SCALES - GENERAL: PAINLEVEL_OUTOF10: 6

## 2022-03-28 NOTE — PROGRESS NOTES
Patient pleasant and cooperative this am, denies pain at this time. CIWA protocol remains in place and followed. Patient remains on antibiotic therapy with no adverse reactions and remains afebrile. Assessment complete. Will continue to monitor.

## 2022-03-28 NOTE — PROGRESS NOTES
Infectious Disease     Patient Name: Penelope Cao  Date: 3/28/2022  YOB: 1960  Medical Record Number: [id number]        Enterococcus faecalis sepsis  Pyelonephritis    History of Present Illness:  Chronic back pain erectile dysfunction hypertension sleep apnea urinary incontinence substance abuse      Patient presents with right flank pain hematuria dysuria ongoing for 10 days    Patient reported to be intoxicated on presentation to the emergency room  On 3/24/2021    Blood cultures from 3/24/2021 2 sets are growing gram-positive cocci    Molecular testing could not given identification  Repeat blood cultures were drawn today    Urine culture growing Enterococcus faecalis more than 100,000 colonies  Urinalysis on admission showed 20-50 white cells per high-power field  red blood cells per high-power field    EXAMINATION: CT ABDOMEN PELVIS W IV CONTRAST        HISTORY:  hematuria; right flan k pain         COMPARISON: CT abdomen pelvis from January 14, 2017       TECHNIQUE: Contiguous axial CT sections of the abdomen and pelvis.  Imaging was obtained after IV contrast administration. .     Sagittal and coronal reformats have been obtained. All CT scans at this facility use dose modulation, iterative reconstruction, and/or weight based dosing when appropriate to reduce radiation dose to as low as reasonably achievable.       FINDINGS   This study is mildly limited due to motion artifact.       Lung bases:Visualized lung bases show no significant pathology       Liver: The liver is normal in size and enhancement. There are no focal solid or cystic lesions. There is no intra or extrahepatic bile duct dilatation.       Gallbladder: No calcified gallstones. Normal gallbladder wall. No pericholecystic fluid.            Spleen: There are no focal lesions or calcifications in the spleen. There is no splenomegaly        Pancreas:  The pancreas is normal in size and attenuation without focal lesions or dilatation of the pancreatic duct. Adrenal glands are negative.       Kidneys: There are no solid renal lesions. There are prompt bilateral nephrograms after IV contrast administration with prompt excretion. There are mildly prominent bilateral renal collecting systems and mildly prominent ureters, hydronephrosis and mild    hydroureter. There are no radiopaque stones       The urinary bladder contains no radiopaque filling defects. There is mild thickening of the urinary bladder wall measuring up to 10 mm. The prostate gland is prominent and there are heavy calcifications of prostate gland similar to the prior study.       Bowel:  There is no bowel dilatation or evidence of diverticulitis.         Appendix: There  is no CT evidence for appendicitis.         Nodes: No lymphadenopathy.        Aorta: There is no abdominal aortic aneurysm.         Peritoneum: No free fluid or free air.         Abdominal wall: There are bilateral inguinal hernias containing fat measuring 2.4 cm on the right and 2.0 cm on the left.       Bones : There are no acute osseous changes.       Soft tissues: The soft tissues are unremarkable.                Impression   There is mild bilateral hydronephrosis and hydroureter with thickening of the wall of the urinary bladder. There are no radiopaque renal stones or bladder stones. There is mild hypertrophy of the prostate gland with prostatic calcifications similar to    the prior study. The findings may be secondary to bladder outlet obstruction and vesicoureteral reflux. Review of Systems   Constitutional: Negative for fever. Cardiovascular: Negative. Gastrointestinal: Negative for abdominal distention, diarrhea, nausea and vomiting. Genitourinary: Positive for dysuria. Negative for flank pain, frequency and urgency. Physical Exam  Cardiovascular:      Heart sounds: Normal heart sounds. No murmur heard.       Pulmonary:      Effort: Pulmonary effort is normal. No respiratory distress. Breath sounds: Normal breath sounds. No wheezing, rhonchi or rales. Abdominal:      General: There is no distension. Palpations: There is no mass. Tenderness: There is no abdominal tenderness. There is no guarding or rebound. Hernia: No hernia is present. Musculoskeletal:         General: No swelling. Blood pressure (!) 147/75, pulse 106, temperature 99.5 °F (37.5 °C), temperature source Oral, resp. rate 16, height 5' 10\" (1.778 m), weight 199 lb 6.4 oz (90.4 kg), SpO2 97 %.       .   Lab Results   Component Value Date    WBC 13.4 (H) 03/25/2022    HGB 12.9 (L) 03/25/2022    HCT 38.5 (L) 03/25/2022    MCV 92.3 03/25/2022     03/25/2022     Lab Results   Component Value Date     03/28/2022    K 3.8 03/28/2022     03/28/2022    CO2 18 03/28/2022    BUN 7 03/28/2022    CREATININE 0.64 03/28/2022    GLUCOSE 139 03/28/2022    GLUCOSE 157 10/19/2011    CALCIUM 8.2 03/28/2022          Enterococcus faecalis Abnormal      Urine Culture, Routine >[id number] CFU/ML    Resulting Agency [address] [facility]          Susceptibility      Enterococcus  faecalis (1)    Antibiotic Interpretation Microscan  Method Status    ampicillin Sensitive <=2 mcg/mL BACTERIAL SUSCEPTIBILITY PANEL BY CADY     nitrofurantoin Sensitive <=16 mcg/mL BACTERIAL SUSCEPTIBILITY PANEL BY CADY     tetracycline Sensitive <=1 mcg/mL BACTERIAL SUSCEPTIBILITY PANEL BY CADY     vancomycin Sensitive 2 mcg/mL BACTERIAL SUSCEPTIBILITY PANEL BY CADY                      ASSESSMENT:  PLAN:  Enterococcus faecalis sepsis    Pyelonephritis with Enterococcus faecalis sen to ampicillin    Most likely blood cultures growing Enterococcus as well    Change vancomycin to ampicillin

## 2022-03-28 NOTE — PROGRESS NOTES
Physician Progress Note      Janet Godfrey  CSN #:                  [id number]  :                       1960  ADMIT DATE:       3/24/2022 6:36 PM  [address] [place] DATE:  RESPONDING  PROVIDER #:        Lauro Murray MD          QUERY TEXT:    Patient admitted with UTI, hematuria. Noted documentation of  Enterococcus   faecalis sepsis  on 3/27/22 by ordered ID consultant. If possible, please   document in progress notes and discharge summary:    The medical record reflects the following:  Risk Factors: 63 yom UTI  Enterococcus faecalis sepsis  Pyelonephritis with   Enterococcus faecalis  Clinical Indicators: WBC 5.7-> 13.4  lactic acid 6.1  4.2   pulse 114-138;    blood cultures 2 out of 2  Gram positive cocci in clusters-resembling Staph    urine Enterococcus faecalis >[id number] CFU/ML  per Dr Marleen Lange 3/27/22: :   \"Enterococcus faecalis sepsis  Pyelonephritis  with Enterococcus faecalis Most   likely blood cultures growing Enterococcus as well\". Treatment: ID  urologist consult blood cultures x 2  urine culture  vancomycin   Rocephin IVF CT abdomen pertinent labs    Thank you,  Bry MIRELESN RN CDS    M-F 6am-2pm  Options provided:  -- sepsis confirmed present on admission  -- Defer to ID consultant documentation regarding sepsis  -- Other - I will add my own diagnosis  -- Disagree - Not applicable / Not valid  -- Disagree - Clinically unable to determine / Unknown  -- Refer to Clinical Documentation Reviewer    PROVIDER RESPONSE TEXT:    The diagnosis of sepsis was confirmed as present on admission.     Query created by: Ck Ribeiro on 3/28/2022 8:42 AM      Electronically signed by:  Lauro Murray MD 3/28/2022 12:47 PM

## 2022-03-28 NOTE — PROGRESS NOTES
Progress Note  Date:3/28/2022       Room:[facility]466-01  Patient Seferino Agrawal     YOB: 1960     Age:62 y.o. Subjective    Subjective:  Symptoms:  He reports malaise and weakness. No shortness of breath, cough, chest pain, headache, chest pressure, anorexia, diarrhea or anxiety. Diet:  No nausea or vomiting. Review of Systems   Respiratory: Negative for cough and shortness of breath. Cardiovascular: Negative for chest pain. Gastrointestinal: Negative for anorexia, diarrhea, nausea and vomiting. Neurological: Positive for weakness. Objective         Vitals Last 24 Hours:  TEMPERATURE:  Temp  Av.9 °F (37.2 °C)  Min: 98.2 °F (36.8 °C)  Max: 99.5 °F (37.5 °C)  RESPIRATIONS RANGE: Resp  Av  Min: 16  Max: 16  PULSE OXIMETRY RANGE: SpO2  Av %  Min: 97 %  Max: 97 %  PULSE RANGE: Pulse  Av.5  Min: 99  Max: 106  BLOOD PRESSURE RANGE: Systolic (80OCE), OQL:092 , Min:112 , YSP:845   ; Diastolic (99TTF), AQZ:05, Min:75, Max:81    I/O (24Hr): Intake/Output Summary (Last 24 hours) at 3/28/2022 1016  Last data filed at 3/28/2022 0634  Gross per 24 hour   Intake 4262.11 ml   Output 400 ml   Net 3862.11 ml     Objective:  General Appearance:  Comfortable, well-appearing and in no acute distress. Vital signs: (most recent): Blood pressure (!) 147/75, pulse 106, temperature 99.5 °F (37.5 °C), temperature source Oral, resp. rate 16, height 5' 10\" (1.778 m), weight 199 lb 6.4 oz (90.4 kg), SpO2 97 %. HEENT: Normal HEENT exam.    Lungs:  He is in respiratory distress. Heart: Normal rate. S1 normal and S2 normal.    Abdomen: Abdomen is soft. Bowel sounds are normal.     Extremities: Normal range of motion. Pulses: Distal pulses are intact. Neurological: Patient is alert. Pupils:  Pupils are equal, round, and reactive to light. Skin:  Warm.       Labs/Imaging/Diagnostics    Labs:  CBC:  No results for input(s): WBC, RBC, HGB, HCT, MCV, RDW, PLT in the last 72 hours. CHEMISTRIES:  Recent Labs     03/26/22  0543 03/27/22  0517 03/28/22  0452    137 136   K 4.1 3.7 3.8   * 105 104   CO2 16* 20 18*   BUN 10 8 7*   CREATININE 0.73 0.65* 0.64*   GLUCOSE 105* 100* 139*     PT/INR:No results for input(s): PROTIME, INR in the last 72 hours. APTT:No results for input(s): APTT in the last 72 hours. LIVER PROFILE:  Recent Labs     03/26/22  0543 03/27/22  0517 03/28/22  0452   AST 67* 77* 84*   ALT 42* 69* 94*   BILITOT 0.3 0.3 0.4   ALKPHOS 67 104 139*       Imaging Last 24 Hours:  CT ABDOMEN PELVIS W IV CONTRAST Additional Contrast? None    Result Date: 3/24/2022  EXAMINATION: CT ABDOMEN PELVIS W IV CONTRAST HISTORY:  hematuria; right flan k pain  COMPARISON: CT abdomen pelvis from January 14, 2017 TECHNIQUE: Contiguous axial CT sections of the abdomen and pelvis. Imaging was obtained after IV contrast administration. .  Sagittal and coronal reformats have been obtained. All CT scans at this facility use dose modulation, iterative reconstruction, and/or weight based dosing when appropriate to reduce radiation dose to as low as reasonably achievable. FINDINGS This study is mildly limited due to motion artifact. Lung bases:Visualized lung bases show no significant pathology Liver: The liver is normal in size and enhancement. There are no focal solid or cystic lesions. There is no intra or extrahepatic bile duct dilatation. Gallbladder: No calcified gallstones. Normal gallbladder wall. No pericholecystic fluid. Spleen: There are no focal lesions or calcifications in the spleen. There is no splenomegaly  Pancreas: The pancreas is normal in size and attenuation without focal lesions or dilatation of the pancreatic duct. Adrenal glands are negative. Kidneys: There are no solid renal lesions. There are prompt bilateral nephrograms after IV contrast administration with prompt excretion.  There are mildly prominent bilateral renal collecting systems and mildly prominent ureters, hydronephrosis and mild hydroureter. There are no radiopaque stones The urinary bladder contains no radiopaque filling defects. There is mild thickening of the urinary bladder wall measuring up to 10 mm. The prostate gland is prominent and there are heavy calcifications of prostate gland similar to the prior study. Bowel: There is no bowel dilatation or evidence of diverticulitis. Appendix: There  is no CT evidence for appendicitis. Nodes: No lymphadenopathy. Aorta: There is no abdominal aortic aneurysm. Peritoneum: No free fluid or free air. Abdominal wall: There are bilateral inguinal hernias containing fat measuring 2.4 cm on the right and 2.0 cm on the left. Bones : There are no acute osseous changes. Soft tissues: The soft tissues are unremarkable. There is mild bilateral hydronephrosis and hydroureter with thickening of the wall of the urinary bladder. There are no radiopaque renal stones or bladder stones. There is mild hypertrophy of the prostate gland with prostatic calcifications similar to the prior study. The findings may be secondary to bladder outlet obstruction and vesicoureteral reflux. Assessment//Plan           Hospital Problems           Last Modified POA    * (Principal) Flank pain 3/24/2022 Yes    Acute cystitis with hematuria 3/25/2022 Yes      mild bilateral hydronephrosis and hydroureter with thickening of the wall of the urinary bladder. Cystitis with hematuria  Etoh abuse with withdrawal  HTN    Enteroccus bacteremia  Assessment & Plan    3/26: Patient was seen and evaluated. C/w current care, FU abx, c/w flomax. FU cultures sensitivity. Fall precuations. 3/27: consult ID, repeat Blood cx, add vanc, pharmacy to dose, c/w CIWA, protocol, agitated, we will consider haldol if needed. 3/28: c/w I vanco, c/w CIwa protocol, pt has ataxia due to Etoh abuse and myopathy, need pt/ot, spoke with casemanager.   Electronically signed by Raul Mendez MD on 3/26/22 at 11:47 AM EDT

## 2022-03-28 NOTE — CONSULTS
ventilated and/ or neurologic status  Social History     Socioeconomic History    Marital status: Single     Spouse name: Not on file    Number of children: Not on file    Years of education: Not on file    Highest education level: Not on file   Occupational History    Not on file   Tobacco Use    Smoking status: Former Smoker     Packs/day: 1.00     Years: 20.00     Pack years: 20.00     Types: Cigarettes     Quit date: 12/15/2021     Years since quittin.2    Smokeless tobacco: Never Used   Substance and Sexual Activity    Alcohol use: Yes     Alcohol/week: 5.0 standard drinks     Types: 5 Shots of liquor per week     Comment: socially    Drug use: Yes     Frequency: 1.0 times per week     Types: Marijuana Don Safer)     Comment: daily    Sexual activity: Not on file   Other Topics Concern    Not on file   Social History Narrative    Not on file     Social Determinants of Health     Financial Resource Strain: Low Risk     Difficulty of Paying Living Expenses: Not very hard   Food Insecurity: No Food Insecurity    Worried About Running Out of Food in the Last Year: Never true    Ran Out of Food in the Last Year: Never true   Transportation Needs:     Lack of Transportation (Medical): Not on file    Lack of Transportation (Non-Medical):  Not on file   Physical Activity:     Days of Exercise per Week: Not on file    Minutes of Exercise per Session: Not on file   Stress:     Feeling of Stress : Not on file   Social Connections:     Frequency of Communication with Friends and Family: Not on file    Frequency of Social Gatherings with Friends and Family: Not on file    Attends Restorationist Services: Not on file    Active Member of Clubs or Organizations: Not on file    Attends Club or Organization Meetings: Not on file    Marital Status: Not on file   Intimate Partner Violence:     Fear of Current or Ex-Partner: Not on file    Emotionally Abused: Not on file    Physically Abused: Not on file    Sexually Abused: Not on file Housing Stability:     Unable to Pay for Housing in the Last Year: Not on file    Number of Places Lived in the Last Year: Not on file    Unstable Housing in the Last Year: Not on file      [] Unable to obtain due to ventilated and/ or neurologic status    Home Medications:      Medications Prior to Admission: amLODIPine (NORVASC) 10 MG tablet, take 1 tablet by mouth once daily  metoprolol tartrate (LOPRESSOR) 25 MG tablet, take 1 tablet by mouth twice a day    Current Hospital Medications:   Scheduled Meds:   vancomycin (VANCOCIN) intermittent dosing (placeholder)   Other RX Placeholder    vancomycin  1,000 mg IntraVENous Q8H    sodium chloride flush  5-40 mL IntraVENous 2 times per day    amLODIPine  10 mg Oral Daily    metoprolol tartrate  25 mg Oral BID    thiamine  100 mg IntraVENous Daily    tamsulosin  0.4 mg Oral Daily     Continuous Infusions:   sodium chloride      sodium chloride 100 mL/hr at 03/28/22 0631     PRN Meds:.haloperidol lactate, sodium chloride flush, sodium chloride, ondansetron **OR** ondansetron, polyethylene glycol, LORazepam **OR** LORazepam **OR** LORazepam **OR** LORazepam **OR** LORazepam **OR** LORazepam **OR** LORazepam **OR** LORazepam   sodium chloride      sodium chloride 100 mL/hr at 03/28/22 0631      Allergies: Allergies   Allergen Reactions    Statins     Proscar [Finasteride] Hives and Swelling      Review of Systems:       [x] CV, Resp, Neuro, , and all other systems reviewed and negative other than listed in HPI. Objective Findings:     Vitals:   Vitals:    03/27/22 0218 03/27/22 1917 03/27/22 1932 03/28/22 0829   BP:  112/81  (!) 147/75   Pulse:  99  106   Resp:    16   Temp: 98.8 °F (37.1 °C) 98.2 °F (36.8 °C) 99.5 °F (37.5 °C)    TempSrc: Oral Oral Oral    SpO2:  97%  97%   Weight:       Height:            Physical Examination:  General: alert  HEENT: Normocephalic, no scleral icterus. Neck: No JVD. Heart: Regular, no murmur, no rub/gallop.  No RV talia.  Lungs: Clear to ascultation, no rales/wheezing/rhonchi. Good chest wall excursion. Abdomen: Appearance:, no Distension , Soft,  tenderness , Bowel sounds normal  Extremities: No clubbing/cyanosis, no edema. Skin: Warm, dry, normal turgor, no rash, no bruise, no petichiae. Neuro: No myoclonus or tremor. Psych: Normal affect    Results/ Medications reviewed 3/28/2022, 10:42 AM     Laboratory, Microbiology, Pathology, Radiology, Cardiology, Medications and Transcriptions reviewed  Scheduled Meds:   vancomycin (VANCOCIN) intermittent dosing (placeholder)   Other RX Placeholder    vancomycin  1,000 mg IntraVENous Q8H    sodium chloride flush  5-40 mL IntraVENous 2 times per day    amLODIPine  10 mg Oral Daily    metoprolol tartrate  25 mg Oral BID    thiamine  100 mg IntraVENous Daily    tamsulosin  0.4 mg Oral Daily     Continuous Infusions:   sodium chloride      sodium chloride 100 mL/hr at 03/28/22 0631       No results for input(s): WBC, HGB, HCT, MCV, PLT in the last 72 hours. Recent Labs     03/26/22 0543 03/27/22  0517 03/28/22  0452    137 136   K 4.1 3.7 3.8   * 105 104   CO2 16* 20 18*   BUN 10 8 7*   CREATININE 0.73 0.65* 0.64*     Recent Labs     03/26/22 0543 03/27/22  0517 03/28/22  0452   AST 67* 77* 84*   ALT 42* 69* 94*   BILITOT 0.3 0.3 0.4   ALKPHOS 67 104 139*     No results for input(s): LIPASE, AMYLASE in the last 72 hours. Recent Labs     03/26/22 0543 03/27/22  0517 03/28/22  0452   PROT 5.6* 5.5* 6.2*     CT ABDOMEN PELVIS W IV CONTRAST Additional Contrast? None    Result Date: 3/24/2022  EXAMINATION: CT ABDOMEN PELVIS W IV CONTRAST HISTORY:  hematuria; right flan k pain  COMPARISON: CT abdomen pelvis from January 14, 2017 TECHNIQUE: Contiguous axial CT sections of the abdomen and pelvis. Imaging was obtained after IV contrast administration. .  Sagittal and coronal reformats have been obtained.  All CT scans at this facility use dose modulation, iterative reconstruction, and/or weight based dosing when appropriate to reduce radiation dose to as low as reasonably achievable. FINDINGS This study is mildly limited due to motion artifact. Lung bases:Visualized lung bases show no significant pathology Liver: The liver is normal in size and enhancement. There are no focal solid or cystic lesions. There is no intra or extrahepatic bile duct dilatation. Gallbladder: No calcified gallstones. Normal gallbladder wall. No pericholecystic fluid. Spleen: There are no focal lesions or calcifications in the spleen. There is no splenomegaly  Pancreas: The pancreas is normal in size and attenuation without focal lesions or dilatation of the pancreatic duct. Adrenal glands are negative. Kidneys: There are no solid renal lesions. There are prompt bilateral nephrograms after IV contrast administration with prompt excretion. There are mildly prominent bilateral renal collecting systems and mildly prominent ureters, hydronephrosis and mild hydroureter. There are no radiopaque stones The urinary bladder contains no radiopaque filling defects. There is mild thickening of the urinary bladder wall measuring up to 10 mm. The prostate gland is prominent and there are heavy calcifications of prostate gland similar to the prior study. Bowel: There is no bowel dilatation or evidence of diverticulitis. Appendix: There  is no CT evidence for appendicitis. Nodes: No lymphadenopathy. Aorta: There is no abdominal aortic aneurysm. Peritoneum: No free fluid or free air. Abdominal wall: There are bilateral inguinal hernias containing fat measuring 2.4 cm on the right and 2.0 cm on the left. Bones : There are no acute osseous changes. Soft tissues: The soft tissues are unremarkable. There is mild bilateral hydronephrosis and hydroureter with thickening of the wall of the urinary bladder. There are no radiopaque renal stones or bladder stones.  There is mild hypertrophy of the prostate gland with prostatic calcifications similar to the prior study. The findings may be secondary to bladder outlet obstruction and vesicoureteral reflux. Impression:   64 y/o admitted with cystitis- associated with hematuria and gram + bacteremia, GI was asked to evaluate abnormal LFTs, on arrival noted mild elevation of transaminases < 2 x normal, worsened over the past few days,  now 2-3 x normal- ALT 94, alk phos 139. Denies history of chronic liver disease. Reports daily alcohol use drinks a pint of [employer] daily, daily marijuana use, & occasional cocaine, does share drug paraphernalia, denies history of HCV. CT of the abdomen and pelvis on arrival noted normal liver. No clinical or lab data suggestive of advanced liver disease. No history of GI bleed, no endoscopic history, hemoglobin 12.9, platelets 083. Abnormal LFTs are likely multifactorial related to alcoholic liver disease/chronic substance abuse, sepsis. .. Plan:   -continue course of care  -avoid hepatotoxic medications, avoid acetaminophen  -strongly stressed alcohol and drug abstinence  -clinical follow up and  serial LFTs and INR  -Given the fever and increase transaminases, obtain hepatotropic viruses serologies including EBV, CMV, HSV.    -Obtain Acute Hepatitis panel   Comments: Thank you for allowing us to participate in the care of this patient. Will continue to follow. Please call if questions or concerns arise. Electronically signed by Alok Kendall MD on 3/28/2022 at 10:42 AM    Please note this report has been partially produced using speech recognition software and may cause contain errors related to that system including grammar, punctuation and spelling as well as words and phrases that may seem inappropriate. If there are questions or concerns please feel free to contact me to clarify.

## 2022-03-28 NOTE — PROGRESS NOTES
[facility] OCCUPATIONAL THERAPY EVALUATION - ACUTE     NAME: Artemio Vallejo  : 1960 (63 y.o.)  MRN: [id number]  CODE STATUS: Full Code  Room: [facility] [id number]    Date of Service: 3/28/2022    Patient Diagnosis(es): Flank pain [R10.9]  Acute cystitis with hematuria [N30.01]   Chief Complaint   Patient presents with    Flank Pain     pt c/o right flank pain, hematuria, and dysuria x10 days     Patient Active Problem List    Diagnosis Date Noted    Acute cystitis with hematuria     Flank pain 2022    Abnormal finding on EKG 2017    Hematuria 2017    Urinary obstruction 2017    HTN (hypertension) 2017        Past Medical History:   Diagnosis Date    Abnormal finding on EKG 2017    Chronic back pain     Erectile dysfunction     HTN (hypertension) 2017    Sleep apnea     Substance abuse ([address]     Urinary incontinence      Past Surgical History:   Procedure Laterality Date    CYSTOSCOPY  2017    [facility] SKIM MD  BILATERAL RETROGRADE PYELOGRAMS BLADDER BX FULGURATION    FRACTURE SURGERY      LEG SURGERY Bilateral     screws and plates in both after broken         Restrictions:Fall, IV        Safety Devices: Safety Devices  Safety Devices in place: Yes  Type of devices: All fall risk precautions in place   Initially in place: No    Subjective: \"If you don't go to the mailbox for two days the government comes into your house. \"  \"It's not the cleanest house. \"  \"That's what happens when you hit a tree going 140 mph. \"(LE scars)       Pain Reassessment: 0/10 pain reported.           Prior Level of Function:  Social/Functional History  Lives With: Alone  Type of Home: House  Home Layout: One level (has a basement but doesn't use it)  Home Access: Stairs to enter without rails  Entrance Stairs - Number of Steps: 1  Bathroom Shower/Tub: Tub/Shower unit  Bathroom Equipment: Shower chair,Hand-held shower  Home Equipment: Cane,Quad cane  ADL Assistance: Independent  Homemaking Assistance: Independent  Ambulation Assistance: Independent (stndard cane PRN)  Transfer Assistance: Independent  Active : Yes  Mode of Transportation: Car  Additional Comments: reports one fall 3 months ago    OBJECTIVE:     Orientation Status:  Orientation  Overall Orientation Status: Within Functional Limits    Observation:  Observation/Palpation  Posture: Fair  Observation: flexed posture. Edema: bilateral UE edema  Scar: bilateral LE's    Cognition Status:  Cognition  Cognition Comment: follows one step commands with increased time and repetition PRN. Pt with decreased safety awareness and exhibits tangential thinking when speaking    Perception Status:  Perception  Overall Perceptual Status: WFL    Sensation Status:  Sensation  Overall Sensation Status: Impaired  Light Touch: Partial deficits in the LLE,Partial deficits in the RLE    Vision and Hearing Status:  Vision  Vision: Impaired  Vision Exceptions: Wears glasses at all times  Hearing  Hearing Exceptions: Hard of hearing/hearing concerns,No hearing aid     ROM:   LUE AROM (degrees)  LUE General AROM: limited shoulder  Left Hand AROM (degrees)  Left Hand AROM: WFL  RUE AROM (degrees)  RUE General AROM: limited shoulder  Right Hand AROM (degrees)  Right Hand AROM: WFL    Strength:  LUE Strength  Gross LUE Strength: WFL  L Hand General: 4/5  RUE Strength  Gross RUE Strength: WFL  R Hand General: 4/5    Coordination, Tone, Quality of Movement:    Tone RUE  RUE Tone: Normotonic  Tone LUE  LUE Tone: Normotonic  Coordination  Movements Are Fluid And Coordinated: No  Coordination and Movement description: Decreased speed,Right UE,Left UE    Hand Dominance:  Hand Dominance  Hand Dominance: Right    ADL Status:  ADL  Feeding: Unable to assess(comment)  Grooming: Setup,Verbal cueing,Increased time to complete  UE Bathing: Setup,Verbal cueing,Increased time to complete  LE Bathing: Minimal assistance,Verbal cueing,Increased time to complete  UE Dressing: Setup,Increased time to complete  LE Dressing: Minimal assistance,Verbal cueing,Increased time to complete  Toileting: Unable to assess(comment)          Therapy key for assistance levels -   Independent = Pt. is able to perform task with no assistance but may require a device   Stand by assistance = Pt. does not perform task at an independent level but does not need physical assistance, requires verbal cues  Minimal, Moderate, Maximal Assistance = Pt. requires physical assistance (25%, 50%, 75% assist from helper) for task but is able to actively participate in task   Dependent = Pt. requires total assistance with task and is not able to actively participate with task completion     Functional Mobility:     Transfers  Sit to stand: Stand by assistance  Stand to sit: Stand by assistance    Bed Mobility  Bed mobility  Supine to Sit: Stand by assistance  Sit to Supine: Stand by assistance    Seated and Standing Balance:  Balance  Sitting Balance: Supervision  Standing Balance: Stand by assistance    Functional Endurance:  Activity Tolerance  Activity Tolerance: Patient limited by fatigue,Treatment limited secondary to decreased cognition,Treatment limited secondary to medical complications (free text)    D/C Recommendations:  OT D/C RECOMMENDATIONS  REQUIRES OT FOLLOW UP: Yes    Equipment Recommendations:       OT Education:        OT Follow Up:  OT D/C RECOMMENDATIONS  REQUIRES OT FOLLOW UP: Yes       Assessment/Discharge Disposition:     Performance deficits / Impairments: Decreased functional mobility ,Decreased strength,Decreased endurance,Decreased ADL status,Decreased high-level IADLs,Decreased posture,Decreased sensation,Decreased safe awareness,Decreased balance,Decreased cognition  Prognosis: Fair  Discharge Recommendations: Continue to assess pending progress  Decision Making: Medium Complexity  History: 3 complexities  Exam: multiple  Assistance / Modification: Min A    Six Click Score    How much help for putting on and taking off regular lower body clothing?: A Little  How much help for Bathing?: A Little  How much help for Toileting?: A Little  How much help for putting on and taking off regular upper body clothing?: None  How much help for taking care of personal grooming?: None  How much help for eating meals?: None  [facility] Inpatient Daily Activity Raw Score: 21  AM-PAC Inpatient ADL T-Scale Score : 44.27  ADL Inpatient CMS 0-100% Score: 32.79    Plan:  Plan  Times per week: 1-4x/wk  Current Treatment Recommendations: Strengthening,Functional Mobility Training,Endurance Training,Balance Training,Neuromuscular Re-education,Self-Care / ADL    Goals:   Patient will:    - Improve functional endurance to tolerate/complete 15-20 mins of ADL's  - Be independent in UB ADLs   - Be independent in LB ADLs  - Be independent in ADL transfers without LOB  - Be independent in toileting tasks  - Improve bilateral UE strength and endurance to Fair+ in order to participate in self-care activities as projected. Patient Goal: Patient goals :  To return to home      Discussed and agreed upon: No Comments: Pt mildly agitated    Therapy Time:   OT Individual Minutes  Time In: 0940  Time Out: 8727  Minutes: 14    Eval: 14 minutes     Electronically signed by:    ADELITA Isaac, REGISR/L  7/18/2228, 12:54 PM Electronically signed by ADELITA Isaac on 0/56/95 at 11:17 AM EDT

## 2022-03-28 NOTE — PROGRESS NOTES
Physical Therapy Med Surg Initial Assessment  Facility/Department: [facility] MED SURG UNIT  [id number]       NAME: Effie Lopez  : 1960 (63 y.o.)  MRN: [id number]  CODE STATUS: Full Code    Date of Service: 3/28/2022    Patient Diagnosis(es): Flank pain [R10.9]  Acute cystitis with hematuria [N30.01]   Chief Complaint   Patient presents with    Flank Pain     pt c/o right flank pain, hematuria, and dysuria x10 days     Patient Active Problem List    Diagnosis Date Noted    Acute cystitis with hematuria     Flank pain 2022    Abnormal finding on EKG 2017    Hematuria 2017    Urinary obstruction 2017    HTN (hypertension) 2017        Past Medical History:   Diagnosis Date    Abnormal finding on EKG 2017    Chronic back pain     Erectile dysfunction     HTN (hypertension) 2017    Sleep apnea     Substance abuse ([address]     Urinary incontinence      Past Surgical History:   Procedure Laterality Date    CYSTOSCOPY  2017    [facility] SKIM MD  BILATERAL RETROGRADE PYELOGRAMS BLADDER BX FULGURATION    FRACTURE SURGERY      LEG SURGERY Bilateral     screws and plates in both after broken        Chart Reviewed: Yes  Family / Caregiver Present: No  General Comment  Comments: Pt resting in bed - agreeable to PT evaluation    Restrictions:  Restrictions/Precautions: Fall Risk     SUBJECTIVE: Subjective: \"Never trust James. \"    Pain  Pre Treatment Pain Screening  Comments / Details: denies    Post Treatment Pain Screening:   Pain Assessment  Pain Assessment:  (unchanged)    Prior Level of Function:  Social/Functional History  Lives With: Alone  Type of Home: House  Home Layout: One level (has a basement but doesn't use it)  Home Access: Stairs to enter without rails  Entrance Stairs - Number of Steps: 1  Bathroom Shower/Tub: Tub/Shower unit  Bathroom Equipment: Shower chair,Hand-held shower  Home Equipment: Cane,Quad cane  ADL Assistance: Independent  Homemaking Assistance: Independent  Ambulation Assistance: Independent (stndard cane PRN)  Transfer Assistance: Independent  Active : Yes  Mode of Transportation: Car  Additional Comments: reports one fall 3 months ago    OBJECTIVE:   Vision Exceptions: Wears glasses at all times (states that hes been wearing them more and more)  Hearing Exceptions: Hard of hearing/hearing concerns (B tinnitis)    Cognition:  Orientation Level: Oriented to place,Oriented to situation,Oriented to person,Disoriented to time  Follows Commands: Within Functional Limits    Observation/Palpation  Observation: No acute distress noted. Pt with flat affect. Tangential.    ROM:  RLE PROM: WFL  LLE PROM: WFL    Strength:  Strength RLE  Comment: Grossly 2+/5 throughout  Strength LLE  Comment: Grossly 2+/5 throughout    Neuro:  Balance  Sitting - Static: Good  Sitting - Dynamic: Good  Standing - Static: Fair  Standing - Dynamic: Fair;-  Comments: Delayed righting responses. Motor Control  Gross Motor?:  (delayed motor; impulsive)  Sensation  Overall Sensation Status: Impaired (impaired \"about 50%\" B feet)    Bed mobility  Supine to Sit: Stand by assistance  Sit to Supine: Stand by assistance    Transfers  Sit to Stand: Stand by assistance;Contact guard assistance  Stand to sit: Stand by assistance  Bed to Chair: Stand by assistance;Contact guard assistance  Comment: Decreased safety awareness. Verbal cues for correction with minimal follow through    Ambulation  Ambulation?: Yes  Ambulation 1  Surface: level tile  Device: Rolling Walker  Assistance: Contact guard assistance;Stand by assistance  Quality of Gait: Pt initially reluctant to attempt ambulation with [employer]. However pt requires B UE support when ambulating. Drags L foot. R knee hyperextended. step to pattern.   Distance: 5ft X 2    Stairs/Curb  Stairs?: No         Activity Tolerance  Activity Tolerance: Treatment limited secondary to agitation  Activity Tolerance: requires frequent redirection          PT Education  PT Education: PT Role;Plan of Care;Goals    ASSESSMENT:   Body structures, Functions, Activity limitations: Decreased functional mobility ; Decreased ADL status; Decreased strength;Decreased safe awareness;Decreased balance;Decreased endurance  Decision Making: Medium Complexity  History: High  Exam: Med  Clinical Presentation: Med    Prognosis: Good  Barriers to Learning: awareness/attention    DISCHARGE RECOMMENDATIONS:  Discharge Recommendations: Continue to assess pending progress,Patient would benefit from continued therapy after discharge    Assessment: Continued PT indicated to progress mobility and facilitate DC at highest level of indep and safety. Concerns for DC home d/t decreased balance and safety awareness. Rec therapy stay prior to DC.  REQUIRES PT FOLLOW UP: Yes      PLAN OF CARE:  Plan  Times per week: 3-6  Current Treatment Recommendations: Strengthening,Balance Training,Functional Mobility Training,Gait Training,Stair training,Cognitive/Perceptual Training,ROM,ADL/Self-care Training,Transfer Training,Endurance Training,Neuromuscular Re-education,Cognitive Reorientation,Patient/Caregiver Education & Training,Equipment Evaluation, Education, & procurement,Home Exercise Program,Safety Education & Training  Safety Devices  Type of devices:  All fall risk precautions in place    Goals:  Long term goals  Long term goal 1: Pt to complete bed mobility with indep  Long term goal 2: Pt to complete transfers with indep  Long term goal 3: Pt to ambulate 50-150ft with LRD and indep  Long term goal 4: Pt to manage 1 step with HR and indep    [facility] (6 CLICK) BASIC MOBILITY  AM-PAC Inpatient Mobility Raw Score : 17     Therapy Time:   Individual   Time In 0940   Time Out 0954   Minutes Marya [address] [place] [address], 03/28/22 at 10:43 AM         Definitions for assistance levels  Independent = pt does not require any physical supervision or assistance from another

## 2022-03-28 NOTE — CARE COORDINATION
NEED FINAL ABX ORDERS. POTENTIAL HOME ON PO. KLAUS STATES SHE HELPS, HE IS INDEPENDENT ABLE TO CARE FOR HIMSELF AT HOME.

## 2022-03-29 LAB
ALBUMIN SERPL-MCNC: 3.3 G/DL (ref 3.5–4.6)
ALP BLD-CCNC: 133 U/L (ref 35–104)
ALT SERPL-CCNC: 159 U/L (ref 0–41)
ANION GAP SERPL CALCULATED.3IONS-SCNC: 13 MEQ/L (ref 9–15)
AST SERPL-CCNC: 125 U/L (ref 0–40)
BILIRUB SERPL-MCNC: 0.3 MG/DL (ref 0.2–0.7)
BUN BLDV-MCNC: 9 MG/DL (ref 8–23)
CALCIUM SERPL-MCNC: 8.2 MG/DL (ref 8.5–9.9)
CHLORIDE BLD-SCNC: 108 MEQ/L (ref 95–107)
CO2: 20 MEQ/L (ref 20–31)
CREAT SERPL-MCNC: 0.66 MG/DL (ref 0.7–1.2)
CULTURE, BLOOD ID SENSITIVITY: ABNORMAL
CULTURE, BLOOD ID SENSITIVITY: ABNORMAL
GFR AFRICAN AMERICAN: >60
GFR NON-AFRICAN AMERICAN: >60
GLOBULIN: 2.6 G/DL (ref 2.3–3.5)
GLUCOSE BLD-MCNC: 101 MG/DL (ref 70–99)
HAV IGM SER IA-ACNC: NONREACTIVE
HAV IGM SER IA-ACNC: NONREACTIVE
HCT VFR BLD CALC: 37.5 % (ref 42–52)
HEMOGLOBIN: 12.7 G/DL (ref 14–18)
HEPATITIS B CORE IGM ANTIBODY: NONREACTIVE
HEPATITIS B CORE IGM ANTIBODY: NONREACTIVE
HEPATITIS B SURFACE ANTIGEN: NONREACTIVE
HEPATITIS B SURFACE ANTIGEN: NONREACTIVE
HEPATITIS C ANTIBODY: NONREACTIVE
HEPATITIS C ANTIBODY: NONREACTIVE
INR BLD: 0.9
MCH RBC QN AUTO: 31 PG (ref 27–31.3)
MCHC RBC AUTO-ENTMCNC: 33.8 % (ref 33–37)
MCV RBC AUTO: 91.6 FL (ref 80–100)
ORGANISM: ABNORMAL
ORGANISM: ABNORMAL
PDW BLD-RTO: 13.9 % (ref 11.5–14.5)
PLATELET # BLD: 163 K/UL (ref 130–400)
POTASSIUM REFLEX MAGNESIUM: 4 MEQ/L (ref 3.4–4.9)
PROTHROMBIN TIME: 12.5 SEC (ref 12.3–14.9)
RBC # BLD: 4.09 M/UL (ref 4.7–6.1)
SODIUM BLD-SCNC: 141 MEQ/L (ref 135–144)
TOTAL PROTEIN: 5.9 G/DL (ref 6.3–8)
WBC # BLD: 5.5 K/UL (ref 4.8–10.8)

## 2022-03-29 PROCEDURE — 99232 SBSQ HOSP IP/OBS MODERATE 35: CPT | Performed by: NURSE PRACTITIONER

## 2022-03-29 PROCEDURE — 36415 COLL VENOUS BLD VENIPUNCTURE: CPT

## 2022-03-29 PROCEDURE — 86665 EPSTEIN-BARR CAPSID VCA: CPT

## 2022-03-29 PROCEDURE — 80074 ACUTE HEPATITIS PANEL: CPT

## 2022-03-29 PROCEDURE — 99232 SBSQ HOSP IP/OBS MODERATE 35: CPT | Performed by: INTERNAL MEDICINE

## 2022-03-29 PROCEDURE — 2580000003 HC RX 258: Performed by: INTERNAL MEDICINE

## 2022-03-29 PROCEDURE — 86645 CMV ANTIBODY IGM: CPT

## 2022-03-29 PROCEDURE — 80053 COMPREHEN METABOLIC PANEL: CPT

## 2022-03-29 PROCEDURE — 86663 EPSTEIN-BARR ANTIBODY: CPT

## 2022-03-29 PROCEDURE — 6370000000 HC RX 637 (ALT 250 FOR IP): Performed by: INTERNAL MEDICINE

## 2022-03-29 PROCEDURE — 85610 PROTHROMBIN TIME: CPT

## 2022-03-29 PROCEDURE — 86694 HERPES SIMPLEX NES ANTBDY: CPT

## 2022-03-29 PROCEDURE — 86664 EPSTEIN-BARR NUCLEAR ANTIGEN: CPT

## 2022-03-29 PROCEDURE — 6360000002 HC RX W HCPCS: Performed by: INTERNAL MEDICINE

## 2022-03-29 PROCEDURE — 1210000000 HC MED SURG R&B

## 2022-03-29 PROCEDURE — 85027 COMPLETE CBC AUTOMATED: CPT

## 2022-03-29 PROCEDURE — 86644 CMV ANTIBODY: CPT

## 2022-03-29 PROCEDURE — 6370000000 HC RX 637 (ALT 250 FOR IP): Performed by: UROLOGY

## 2022-03-29 RX ORDER — TAMSULOSIN HYDROCHLORIDE 0.4 MG/1
0.4 CAPSULE ORAL DAILY
Qty: 30 CAPSULE | Refills: 3 | Status: SHIPPED | OUTPATIENT
Start: 2022-03-29 | End: 2022-05-12 | Stop reason: SDUPTHER

## 2022-03-29 RX ORDER — LANOLIN ALCOHOL/MO/W.PET/CERES
6 CREAM (GRAM) TOPICAL NIGHTLY PRN
Status: DISCONTINUED | OUTPATIENT
Start: 2022-03-29 | End: 2022-03-30 | Stop reason: HOSPADM

## 2022-03-29 RX ADMIN — METOPROLOL TARTRATE 25 MG: 25 TABLET, FILM COATED ORAL at 09:37

## 2022-03-29 RX ADMIN — Medication 10 ML: at 10:10

## 2022-03-29 RX ADMIN — THIAMINE HYDROCHLORIDE 100 MG: 100 INJECTION, SOLUTION INTRAMUSCULAR; INTRAVENOUS at 09:37

## 2022-03-29 RX ADMIN — METOPROLOL TARTRATE 25 MG: 25 TABLET, FILM COATED ORAL at 21:00

## 2022-03-29 RX ADMIN — AMPICILLIN SODIUM 2000 MG: 2 INJECTION, POWDER, FOR SOLUTION INTRAVENOUS at 04:53

## 2022-03-29 RX ADMIN — TAMSULOSIN HYDROCHLORIDE 0.4 MG: 0.4 CAPSULE ORAL at 09:37

## 2022-03-29 RX ADMIN — AMPICILLIN SODIUM 2000 MG: 2 INJECTION, POWDER, FOR SOLUTION INTRAVENOUS at 18:34

## 2022-03-29 RX ADMIN — AMLODIPINE BESYLATE 10 MG: 10 TABLET ORAL at 09:37

## 2022-03-29 RX ADMIN — AMPICILLIN SODIUM 2000 MG: 2 INJECTION, POWDER, FOR SOLUTION INTRAVENOUS at 12:00

## 2022-03-29 ASSESSMENT — ENCOUNTER SYMPTOMS
COUGH: 0
NAUSEA: 0
DIARRHEA: 0
SHORTNESS OF BREATH: 0
VOMITING: 0

## 2022-03-29 ASSESSMENT — PAIN SCALES - GENERAL: PAINLEVEL_OUTOF10: 0

## 2022-03-29 NOTE — PLAN OF CARE
Problem: Pain:  Goal: Pain level will decrease  Description: Pain level will decrease  Outcome: Ongoing  Goal: Control of acute pain  Description: Control of acute pain  Outcome: Ongoing     Problem: Falls - Risk of:  Goal: Will remain free from falls  Description: Will remain free from falls  Outcome: Ongoing     Problem: Injury - Risk of, Physical Injury:  Goal: Will remain free from falls  Description: Will remain free from falls  Outcome: Ongoing     Problem: Mood - Altered:  Goal: Mood stable  Description: Mood stable  Outcome: Ongoing  Goal: Verbalizations of feeling emotionally comfortable while being cared for will increase  Description: Verbalizations of feeling emotionally comfortable while being cared for will increase  Outcome: Ongoing

## 2022-03-29 NOTE — PROGRESS NOTES
Patient pleasant and cooperative this am, denies pain or discomfort at this time. Patient continues on antibiotic therapy with no adverse reactions noted at this time and remains afebrile. Assessment complete. Will continue to monitor.

## 2022-03-29 NOTE — PROGRESS NOTES
Gastroenterology Progress Note    Loida Alas is a 63 y.o. male patient. Hospitalization Day:5    Chief C/O: abnormal LFTs    SUBJECTIVE: No acute events overnight noted mildly worsening LFTs, no abdominal pain, noted fever overnight, tolerating diet. ROS:  Gastrointestinal ROS: no abdominal pain, change in bowel habits, or black or bloody stools    Physical    VITALS:  BP (!) 142/79   Pulse 85   Temp 98.2 °F (36.8 °C)   Resp 16   Ht 5' 10\" (1.778 m)   Wt 199 lb 6.4 oz (90.4 kg)   SpO2 98%   BMI 28.61 kg/m²   TEMPERATURE:  Current - Temp: 98.2 °F (36.8 °C); Max - Temp  Av.1 °F (37.3 °C)  Min: 98.2 °F (36.8 °C)  Max: 99.9 °F (37.7 °C)    General:  Alert and oriented,  No apparent distress  Skin- without jaundice  Eyes: anicteric sclera  Cardiac: RRR, Nl s1s2, without murmurs  Lungs CTA Bilaterally, normal effort  Abdomen soft, ND, NT, no HSM, Bowel sounds normal  Ext: without edema  Neuro: no asterixis     Data    Data Review:    Recent Labs     22  0636   WBC 5.5   HGB 12.7*   HCT 37.5*   MCV 91.6        Recent Labs     22  0517 22  0452 22  0636    136 141   K 3.7 3.8 4.0    104 108*   CO2 20 18* 20   BUN 8 7* 9   CREATININE 0.65* 0.64* 0.66*     Recent Labs     22  0517 22  0452 22  0636   AST 77* 84* 125*   ALT 69* 94* 159*   BILITOT 0.3 0.4 0.3   ALKPHOS 104 139* 133*     No results for input(s): LIPASE, AMYLASE in the last 72 hours. Recent Labs     22  1114 22  0636   PROTIME 12.6 12.5   INR 0.9 0.9           ASSESSMENT:  62 y/o admitted with cystitis- associated with hematuria and gram + bacteremia, GI was asked to evaluate abnormal LFTs, on arrival noted mild elevation of transaminases < 2 x normal, worsened over the past few days,  now 2-3 x normal- ALT 94, alk phos 139. Denies history of chronic liver disease.   Reports daily alcohol use drinks a pint of Michael Beam daily, daily marijuana use, & occasional cocaine, does share drug paraphernalia, denies history of HCV. CT of the abdomen and pelvis on arrival noted normal liver. No clinical or lab data suggestive of advanced liver disease. No history of GI bleed, no endoscopic history, hemoglobin 12.9, platelets 605. Abnormal LFTs are likely multifactorial related to alcoholic liver disease/chronic substance abuse, sepsis. Cari Neighbours Cari Neighbours LFTs worsening, no encephalopathy or coagulopathy, no abd pain, no F/C,  tolerating diet. Hepatitis panel negative    PLAN :  -continue course of care  -avoid hepatotoxic medications, avoid acetaminophen  -strongly stressed alcohol and drug abstinence  -clinical follow up and  serial LFTs and INR  await blood work for hepatotropic viruses      Thank you for allowing me to participate in the care of your patient. Please feel free to contact me with any concerns.     SHREYA Silva - CNP

## 2022-03-29 NOTE — PROGRESS NOTES
CHEMISTRIES:  Recent Labs     03/27/22  0517 03/28/22  0452 03/29/22  0636    136 141   K 3.7 3.8 4.0    104 108*   CO2 20 18* 20   BUN 8 7* 9   CREATININE 0.65* 0.64* 0.66*   GLUCOSE 100* 139* 101*     PT/INR:  Recent Labs     03/28/22  1114 03/29/22  0636   PROTIME 12.6 12.5   INR 0.9 0.9     APTT:No results for input(s): APTT in the last 72 hours. LIVER PROFILE:  Recent Labs     03/27/22  0517 03/28/22  0452 03/29/22  0636   AST 77* 84* 125*   ALT 69* 94* 159*   BILITOT 0.3 0.4 0.3   ALKPHOS 104 139* 133*       Imaging Last 24 Hours:  CT ABDOMEN PELVIS W IV CONTRAST Additional Contrast? None    Result Date: 3/24/2022  EXAMINATION: CT ABDOMEN PELVIS W IV CONTRAST HISTORY:  hematuria; right flan k pain  COMPARISON: CT abdomen pelvis from January 14, 2017 TECHNIQUE: Contiguous axial CT sections of the abdomen and pelvis. Imaging was obtained after IV contrast administration. .  Sagittal and coronal reformats have been obtained. All CT scans at this facility use dose modulation, iterative reconstruction, and/or weight based dosing when appropriate to reduce radiation dose to as low as reasonably achievable. FINDINGS This study is mildly limited due to motion artifact. Lung bases:Visualized lung bases show no significant pathology Liver: The liver is normal in size and enhancement. There are no focal solid or cystic lesions. There is no intra or extrahepatic bile duct dilatation. Gallbladder: No calcified gallstones. Normal gallbladder wall. No pericholecystic fluid. Spleen: There are no focal lesions or calcifications in the spleen. There is no splenomegaly  Pancreas: The pancreas is normal in size and attenuation without focal lesions or dilatation of the pancreatic duct. Adrenal glands are negative. Kidneys: There are no solid renal lesions. There are prompt bilateral nephrograms after IV contrast administration with prompt excretion.  There are mildly prominent bilateral renal collecting systems and mildly prominent ureters, hydronephrosis and mild hydroureter. There are no radiopaque stones The urinary bladder contains no radiopaque filling defects. There is mild thickening of the urinary bladder wall measuring up to 10 mm. The prostate gland is prominent and there are heavy calcifications of prostate gland similar to the prior study. Bowel: There is no bowel dilatation or evidence of diverticulitis. Appendix: There  is no CT evidence for appendicitis. Nodes: No lymphadenopathy. Aorta: There is no abdominal aortic aneurysm. Peritoneum: No free fluid or free air. Abdominal wall: There are bilateral inguinal hernias containing fat measuring 2.4 cm on the right and 2.0 cm on the left. Bones : There are no acute osseous changes. Soft tissues: The soft tissues are unremarkable. There is mild bilateral hydronephrosis and hydroureter with thickening of the wall of the urinary bladder. There are no radiopaque renal stones or bladder stones. There is mild hypertrophy of the prostate gland with prostatic calcifications similar to the prior study. The findings may be secondary to bladder outlet obstruction and vesicoureteral reflux. Assessment//Plan           Hospital Problems           Last Modified POA    * (Principal) Flank pain 3/24/2022 Yes    Acute cystitis with hematuria 3/25/2022 Yes    Abnormal LFTs 3/28/2022 Yes      mild bilateral hydronephrosis and hydroureter with thickening of the wall of the urinary bladder. Cystitis with hematuria  Etoh abuse with withdrawal  HTN    Enteroccus bacteremia  Elevated LFTS  Assessment & Plan    3/26: Patient was seen and evaluated. C/w current care, FU abx, c/w flomax. FU cultures sensitivity. Fall precuations. 3/27: consult ID, repeat Blood cx, add vanc, pharmacy to dose, c/w CIWA, protocol, agitated, we will consider haldol if needed.   3/28: c/w I vanco, c/w CIwa protocol, pt has ataxia due to Etoh abuse and myopathy, need pt/ot, spoke with danny. 3/29: Continue CIWA protocol, patient has worsening LFTs. Follow-up with GI. Continue with current care. Once cleared by all consultant patient can be discharged. Hepatitis profile is negative.   Electronically signed by Pedro Longoria MD on 3/26/22 at 11:47 AM EDT

## 2022-03-29 NOTE — PROGRESS NOTES
2100: Shift assessment complete, vss. Pt alert & oriented, calm & cooperative. Pt denies c/o pain, SOB, chest pain. PM meds given as per MAR. No further complaints at this time, call light is within reach. Safety maintained.     Electronically signed by Cisco Aburto RN on 3/29/22 at 1:12 AM EDT

## 2022-03-30 VITALS
DIASTOLIC BLOOD PRESSURE: 69 MMHG | BODY MASS INDEX: 28.55 KG/M2 | WEIGHT: 199.4 LBS | SYSTOLIC BLOOD PRESSURE: 131 MMHG | OXYGEN SATURATION: 95 % | TEMPERATURE: 98.4 F | RESPIRATION RATE: 18 BRPM | HEIGHT: 70 IN | HEART RATE: 69 BPM

## 2022-03-30 LAB
ALBUMIN SERPL-MCNC: 3.3 G/DL (ref 3.5–4.6)
ALP BLD-CCNC: 128 U/L (ref 35–104)
ALT SERPL-CCNC: 146 U/L (ref 0–41)
ANION GAP SERPL CALCULATED.3IONS-SCNC: 14 MEQ/L (ref 9–15)
AST SERPL-CCNC: 78 U/L (ref 0–40)
BILIRUB SERPL-MCNC: 0.3 MG/DL (ref 0.2–0.7)
BUN BLDV-MCNC: 13 MG/DL (ref 8–23)
CALCIUM SERPL-MCNC: 8.5 MG/DL (ref 8.5–9.9)
CHLORIDE BLD-SCNC: 106 MEQ/L (ref 95–107)
CO2: 21 MEQ/L (ref 20–31)
CREAT SERPL-MCNC: 0.77 MG/DL (ref 0.7–1.2)
GFR AFRICAN AMERICAN: >60
GFR NON-AFRICAN AMERICAN: >60
GLOBULIN: 2.8 G/DL (ref 2.3–3.5)
GLUCOSE BLD-MCNC: 98 MG/DL (ref 70–99)
HCT VFR BLD CALC: 39.5 % (ref 42–52)
HEMOGLOBIN: 13.1 G/DL (ref 14–18)
HIV AG/AB: NONREACTIVE
INR BLD: 0.9
MCH RBC QN AUTO: 30.8 PG (ref 27–31.3)
MCHC RBC AUTO-ENTMCNC: 33.2 % (ref 33–37)
MCV RBC AUTO: 93 FL (ref 80–100)
PDW BLD-RTO: 14.2 % (ref 11.5–14.5)
PLATELET # BLD: 186 K/UL (ref 130–400)
POTASSIUM REFLEX MAGNESIUM: 4.3 MEQ/L (ref 3.4–4.9)
PROCALCITONIN: 1.44 NG/ML (ref 0–0.15)
PROTHROMBIN TIME: 12.5 SEC (ref 12.3–14.9)
RBC # BLD: 4.24 M/UL (ref 4.7–6.1)
SODIUM BLD-SCNC: 141 MEQ/L (ref 135–144)
TOTAL PROTEIN: 6.1 G/DL (ref 6.3–8)
WBC # BLD: 7.5 K/UL (ref 4.8–10.8)

## 2022-03-30 PROCEDURE — 2580000003 HC RX 258: Performed by: INTERNAL MEDICINE

## 2022-03-30 PROCEDURE — 6360000002 HC RX W HCPCS: Performed by: INTERNAL MEDICINE

## 2022-03-30 PROCEDURE — 87389 HIV-1 AG W/HIV-1&-2 AB AG IA: CPT

## 2022-03-30 PROCEDURE — 6370000000 HC RX 637 (ALT 250 FOR IP): Performed by: INTERNAL MEDICINE

## 2022-03-30 PROCEDURE — 85027 COMPLETE CBC AUTOMATED: CPT

## 2022-03-30 PROCEDURE — 80053 COMPREHEN METABOLIC PANEL: CPT

## 2022-03-30 PROCEDURE — 85610 PROTHROMBIN TIME: CPT

## 2022-03-30 PROCEDURE — 99232 SBSQ HOSP IP/OBS MODERATE 35: CPT | Performed by: NURSE PRACTITIONER

## 2022-03-30 PROCEDURE — 6370000000 HC RX 637 (ALT 250 FOR IP): Performed by: UROLOGY

## 2022-03-30 PROCEDURE — 36415 COLL VENOUS BLD VENIPUNCTURE: CPT

## 2022-03-30 PROCEDURE — 84145 PROCALCITONIN (PCT): CPT

## 2022-03-30 RX ORDER — AMOXICILLIN 500 MG/1
500 CAPSULE ORAL EVERY 8 HOURS SCHEDULED
Status: DISCONTINUED | OUTPATIENT
Start: 2022-03-30 | End: 2022-03-30 | Stop reason: HOSPADM

## 2022-03-30 RX ORDER — AMOXICILLIN 500 MG/1
500 CAPSULE ORAL EVERY 8 HOURS SCHEDULED
Qty: 42 CAPSULE | Refills: 0 | Status: SHIPPED | OUTPATIENT
Start: 2022-03-30 | End: 2022-04-08

## 2022-03-30 RX ORDER — GREEN TEA/HOODIA GORDONII 315-12.5MG
1 CAPSULE ORAL DAILY
Qty: 30 TABLET | Refills: 0 | Status: SHIPPED | OUTPATIENT
Start: 2022-03-30 | End: 2022-04-29

## 2022-03-30 RX ADMIN — AMPICILLIN SODIUM 2000 MG: 2 INJECTION, POWDER, FOR SOLUTION INTRAVENOUS at 00:44

## 2022-03-30 RX ADMIN — AMPICILLIN SODIUM 2000 MG: 2 INJECTION, POWDER, FOR SOLUTION INTRAVENOUS at 12:52

## 2022-03-30 RX ADMIN — AMPICILLIN SODIUM 2000 MG: 2 INJECTION, POWDER, FOR SOLUTION INTRAVENOUS at 06:46

## 2022-03-30 RX ADMIN — THIAMINE HYDROCHLORIDE 100 MG: 100 INJECTION, SOLUTION INTRAMUSCULAR; INTRAVENOUS at 08:42

## 2022-03-30 RX ADMIN — Medication 10 ML: at 08:44

## 2022-03-30 RX ADMIN — AMLODIPINE BESYLATE 10 MG: 10 TABLET ORAL at 08:42

## 2022-03-30 RX ADMIN — METOPROLOL TARTRATE 25 MG: 25 TABLET, FILM COATED ORAL at 08:42

## 2022-03-30 RX ADMIN — LORAZEPAM 2 MG: 1 TABLET ORAL at 08:42

## 2022-03-30 RX ADMIN — TAMSULOSIN HYDROCHLORIDE 0.4 MG: 0.4 CAPSULE ORAL at 08:42

## 2022-03-30 NOTE — PROGRESS NOTES
Gastroenterology Progress Note    Judith Munoz is a 63 y.o. male patient. Hospitalization Day:6    Chief C/O: abnormal LFTs    SUBJECTIVE: No acute events overnight, LFTs improved overnight, no abdominal pain, tolerating diet, no fever overnight. ROS:  Gastrointestinal ROS: no abdominal pain, change in bowel habits, or black or bloody stools    Physical    VITALS:  /69   Pulse 69   Temp 98.4 °F (36.9 °C) (Oral)   Resp 18   Ht 5' 10\" (1.778 m)   Wt 199 lb 6.4 oz (90.4 kg)   SpO2 95%   BMI 28.61 kg/m²   TEMPERATURE:  Current - Temp: 98.4 °F (36.9 °C); Max - Temp  Av.3 °F (36.8 °C)  Min: 98.2 °F (36.8 °C)  Max: 98.4 °F (36.9 °C)    General:  Alert and oriented,  No apparent distress  Skin- without jaundice  Eyes: anicteric sclera  Cardiac: RRR, Nl s1s2, without murmurs  Lungs CTA Bilaterally, normal effort  Abdomen soft, ND, NT, no HSM, Bowel sounds normal  Ext: without edema  Neuro: no asterixis     Data    Data Review:    Recent Labs     22  0636 22  0449   WBC 5.5 7.5   HGB 12.7* 13.1*   HCT 37.5* 39.5*   MCV 91.6 93.0    186     Recent Labs     22  0452 22  0636 22  0448    141 141   K 3.8 4.0 4.3    108* 106   CO2 18* 20 21   BUN 7* 9 13   CREATININE 0.64* 0.66* 0.77     Recent Labs     22  0452 22  0636 22  0448   AST 84* 125* 78*   ALT 94* 159* 146*   BILITOT 0.4 0.3 0.3   ALKPHOS 139* 133* 128*     No results for input(s): LIPASE, AMYLASE in the last 72 hours.   Recent Labs     22  1114 22  0636 22  0449   PROTIME 12.6 12.5 12.5   INR 0.9 0.9 0.9           ASSESSMENT:  62 y/o admitted with cystitis- associated with hematuria and gram + bacteremia, GI was asked to evaluate abnormal LFTs, on arrival noted mild elevation of transaminases < 2 x normal, worsened over the past few days,  now 2-3 x normal- ALT 94, alk phos 139.  Denies history of chronic liver disease.  Reports daily alcohol use drinks a pint of Michael Beam daily, daily marijuana use, & occasional cocaine, does share drug paraphernalia, denies history of HCV.  CT of the abdomen and pelvis on arrival noted normal liver.  No clinical or lab data suggestive of advanced liver disease.  No history of GI bleed, no endoscopic history, hemoglobin 12.9, platelets 163.  Abnormal LFTs are likely multifactorial related to alcoholic liver disease/chronic substance abuse, sepsis. Owen Blandon LFTs improved, no abd pain, tolerating diet  PLAN :  -continue course of care  -avoid hepatotoxic medications, avoid acetaminophen  -strongly stressed alcohol and drug abstinence  -clinical follow up and  serial LFTs and INR  await blood work for hepatotropic viruses    Thank you for allowing me to participate in the care of your patient. Please feel free to contact me with any concerns.     SHREYA Green - CNP

## 2022-03-30 NOTE — PROGRESS NOTES
2100: Shift assessment complete, vss. Pt alert & oriented, calm & cooperative. Pt denies c/o pain, SOB, chest pain. PM meds given as per MAR. No further complaints at this time, call light is within reach. Safety maintained. 0100: Pt is becoming angry at his incontinent episodes -- aggressively moving & throwing things. Pt verbally expressing his anger.     Electronically signed by Cisco Aburto RN on 3/30/22 at 2:38 AM EDT

## 2022-03-30 NOTE — PROGRESS NOTES
3/30/22    From:HOME ALONE     Admit: HEMATURIA, RIGHT FLANK PAIN    PMH:SUBSTANCE ABUSE, HTN, UTI, BPH    Anticipated Discharge Disposition: HOME / [address]  Patient Mobility or PT/OT ordered:YES. PT REC SNF- PT [facility], DECLINES SNF.      Consults: VALLES, ID, GI    Covid result &/or vacc status: X 2 / NEEDS BOOSTER    Barriers to Discharge:CIWA, NEED FINAL ABX PLAN- IV VS PO,   Assessments:   CMI AND DCCOP COMPLETED

## 2022-03-30 NOTE — PROGRESS NOTES
[facility] Occupational Therapy      Date: 3/30/2022  Patient Name: Nano Gaston        MRN: [id number]  Account: [de-identified]   : 1960  (63 y.o.)  Room: [place] [address]    Chart reviewed, attempted OT at 9 8612 for eval. Patient not seen 2° to: Other: Pt sleeping very heavily, snoring. Did not awaken easily. Per nursing notes, has been agitated this AM. Plan is d/c home. Did not complete tx this AM.    Spoke to [employer], RN aware. Will attempt again when able.     Electronically signed by ADELITA Duncan on 3/30/2022 at 10:43 AM

## 2022-03-30 NOTE — PROGRESS NOTES
9209: Patient extremely agitated and cussing out. Patient set off bed alarm. Scored CIWA-12.      [id number]: Assessment completed and charted on by this RN. VSS. A&Ox4. Medicated for CIWA score per MAR. Medications given per MAR. Pt up to bathroom at this time with cane with stand by assist. Pt return back to bed--bed alarm on and brief changed. 1225: Dr. Samantha Bryson about discharge order being in--Dr. Judith Bravo okay with patient being discharged. F/U with GI in 2 weeks. 1415: Dr. Ella Goode regarding PO vs IV antibiotics upon discharge---responded patient can go home on PO antibiotics and will call pharmacy to dose. 1515: Dr. Fermin Pozo said it is ok for patient to be discharged and does not need follow-up. 1425: Dr. Courtney Andrews sent over antibiotic prescription to [facility]. Patient agitated upon discharge related to his continued incontinent episodes. Instructions gone over with patient. Patient verbalized understanding of instructions, medications, and follow-ups. He has no questions for this RN. IV was removed. Sister in law Mary Ron providing transport home to patient.      Electronically signed by Alejandra Galeano RN on 3/30/22 at 4:28 PM EDT

## 2022-03-30 NOTE — DISCHARGE SUMMARY
Discharge Summary    Date: 3/30/2022  Patient Name: Colby Ahn YOB: 1960 Age: 63 y.o. Admit Date: 3/24/2022  Discharge Date: 3/30/2022  Discharge Condition: [address]    Admission Diagnosis  Flank pain (R10.9); Acute cystitis with hematuria (N30.01)     Discharge Diagnosis  Principal Problem: Flank painActive Problems: Acute cystitis with hematuria Abnormal LFTsResolved Problems: * No resolved hospital problems. [facility] Stay  Narrative of Hospital Course:  Patient comes with sepsis secondary to Enterococcus bacteremia was most likely due to urine. Received CIWA protocol due to withdrawal from alcohol. Received IV Vanco, then will switch to ampicillin. Patient was seen urology for hydronephrosis and recommended patient to start on Flomax. Consultants:  IP CONSULT TO SOCIAL WORKIP CONSULT TO UROLOGYIP CONSULT TO INFECTIOUS DISEASESIP CONSULT TO GI    Surgeries/procedures Performed:       Treatments:           Discharge Plan/Disposition:  Home    Hospital/Incidental Findings Requiring Follow Up:    Patient Instructions:    Diet:    Activity:  For number of days (if applicable): Other Instructions:    Provider Follow-Up:   No follow-ups on file. Significant Diagnostic Studies:    Recent Labs:  Admission on 03/24/2022No results displayed because visit has over 200 results. ------------    Radiology last 7 days:  CT ABDOMEN PELVIS W IV CONTRAST Additional Contrast? NoneResult Date: 3/24/2022There is mild bilateral hydronephrosis and hydroureter with thickening of the wall of the urinary bladder. There are no radiopaque renal stones or bladder stones. There is mild hypertrophy of the prostate gland with prostatic calcifications similar to the prior study. The findings may be secondary to bladder outlet obstruction and vesicoureteral reflux. US DUP UPPER EXTREMITY LEFT VENOUSResult Date: 3/28/2022NEGATIVE STUDY FOR ACUTE DVT IN THE LEFT UPPER EXTREMITY.  NEGATIVE STUDY FOR SUPERFICIAL

## 2022-03-30 NOTE — PLAN OF CARE
Problem: Falls - Risk of:  Goal: Will remain free from falls  Description: Will remain free from falls  Outcome: Ongoing  Goal: Absence of physical injury  Description: Absence of physical injury  Outcome: Ongoing     Problem: Discharge Planning:  Goal: Ability to perform activities of daily living will improve  Description: Ability to perform activities of daily living will improve  Outcome: Ongoing     Problem: Injury - Risk of, Physical Injury:  Goal: Will remain free from falls  Description: Will remain free from falls  Outcome: Ongoing  Goal: Absence of physical injury  Description: Absence of physical injury  Outcome: Ongoing     Problem: Mood - Altered:  Goal: Mood stable  Description: Mood stable  Outcome: Ongoing     Problem: Skin Integrity:  Goal: Will show no infection signs and symptoms  Description: Will show no infection signs and symptoms  Outcome: Ongoing

## 2022-03-30 NOTE — PROGRESS NOTES
Physical Therapy Missed Treatment   Facility/Department: [facility] MED SURG [id number]/[id number]    NAME: Reji Strong    : 1960 (63 y.o.)  MRN: [id number]    Account: [de-identified]  Gender: male    Chart reviewed, attempted PT at 1. Patient unavailable 2° to:    [] Hold per nsg request    [x] Pt declined, pt adamantly declines to participate stating \"there's nothing therapy can do for me. \" Encouragement and education provided on PT's role within the hospital pt still declines. [] Pt. . off floor for test/procedure. [] Pt. Unavailable       Will attempt PT treatment again at earliest convenience.       Electronically signed by Mitali Solis PTA on 3/30/22 at 2:27 PM EDT

## 2022-03-30 NOTE — CARE COORDINATION
THIS LSW MET WITH PATIENT AT BEDSIDE THIS AM. PATIENT HAS DISCHARGE ORDERS. PATIENT DENIES NEEDS. PATIENT WILL HAVE SISTER -IN-LAW DARRION PROVIDE TRANSPORTATION HOME.   IMM COMPLETED  Electronically signed by BALDEMAR Cristina, ERICK on 3/30/22 at 3:25 PM EDT

## 2022-03-31 ENCOUNTER — CARE COORDINATION (OUTPATIENT)
Dept: CARE COORDINATION | Age: 62
End: 2022-03-31

## 2022-03-31 LAB
CYTOMEGALOVIRUS IGG ANTIBODY: <0.2 U/ML
CYTOMEGALOVIRUS IGM ANTIBODY: <8 AU/ML
EPSTEIN BARR VIRUS NUCLEAR AB IGG: 118 U/ML (ref 0–21.9)
EPSTEIN-BARR EARLY ANTIGEN ANTIBODY: 5.6 U/ML (ref 0–10.9)
EPSTEIN-BARR VCA IGG: 636 U/ML (ref 0–21.9)
EPSTEIN-BARR VCA IGM: <10 U/ML (ref 0–43.9)

## 2022-03-31 NOTE — CARE COORDINATION
Jez 45 Transitions Initial Follow Up Call    Call within 2 business days of discharge: Yes    Patient: Peneolpe Cao Patient : 1960   MRN: [id number]  Reason for Admission: 3/25-3/30/22 Flank  Pain, Acute Cystitis with Hematuria, Abnormal LFTs  Discharge Date: 3/30/22 RARS: Readmission Risk Score: 10.5 ( )      Last Discharge [facility]       Complaint Diagnosis Description Type Department Provider    3/24/22 Flank Pain Acute cystitis with hematuria ED to Hosp-Admission (Discharged) (ADMITTED) PIERRE Hidalgo MD; Moses Thompson, ... Date/Time:  3/31/2022 9:22 AM  Attempted to reach patient by telephone for Initial Care Transitions call. Call within 2 business days of discharge: Yes Left HIPPA compliant message requesting a return call. Will attempt to reach patient again. CARLOS Gillis / Jez Finn Coordinator  [phone]      Facility: [address] [place] [address] Road Transitions 24 Hour Call    Care Transitions Interventions         Follow Up  Future Appointments   Date Time Provider Melody Romero   2022 11:30 AM Carlos A Dee MD [facility] EMERGENCY [facility] AT [place]   2022  1:45 PM Bernadine Mendez MD [address]   2022  1:30 PM Carlos A Dee MD [facility] EMERGENCY [facility] CENTER AT [place]   2022  9:45 AM Glenn Zapata MD [facility] EMERGENCY [facility] AT [place]   11/15/2022  3:00 PM Carlos A Dee MD [facility]       Ann Hahn LPN

## 2022-04-01 ENCOUNTER — CARE COORDINATION (OUTPATIENT)
Dept: CARE COORDINATION | Age: 62
End: 2022-04-01

## 2022-04-01 DIAGNOSIS — N30.01 ACUTE CYSTITIS WITH HEMATURIA: Primary | ICD-10-CM

## 2022-04-01 LAB
BLOOD CULTURE, ROUTINE: ABNORMAL
BLOOD CULTURE, ROUTINE: NORMAL
CULTURE, BLOOD 2: NORMAL
HERPES TYPE 1/2 IGM COMBINED: 0.58 IV
HERPES TYPE I/II IGG COMBINED: >22.4 IV

## 2022-04-01 PROCEDURE — 1111F DSCHRG MED/CURRENT MED MERGE: CPT | Performed by: FAMILY MEDICINE

## 2022-04-01 NOTE — CARE COORDINATION
Jez 45 Transitions Initial Follow Up Call    Call within 2 business days of discharge: Yes    Patient: Cristhian Hope Patient : 1960   MRN: [id number]  Reason for Admission: 3/25-3/30/22 Flank  Pain, Acute Cystitis with Hematuria, Abnormal LFTs  Discharge Date: 3/30/22 RARS: Readmission Risk Score: 10.5 ( )      Last Discharge [facility]       Complaint Diagnosis Description Type Department Provider    3/24/22 Flank Pain Acute cystitis with hematuria ED to Hosp-Admission (Discharged) (ADMITTED) [facility] MED WILFREDO Nancy Bobby MD; Coral William, ... Spoke with: Patient    Marie Echavarria states that he continues to have some urinary leakage. Pt wearing depends at this time. Pt denies CP, SOB, abdominal pain, flank pain, hematuria, urgency, frequency, dysuria. Last BM this morning. Pt states appetite is good and is drinking plenty of fluids. Pt encouraged to drink more water. Pt v/u. Pt continues with antibiotic therapy. Medication review completed, 1111F order placed. PCP and Gastrology appt scheduled. [facility] to transport Pt to and from appts. Pt denies Transportation, Home, or Medication needs. CTN information given, will continue to monitor. Facility: [facility]    Transitions of Care Initial Call    Was this an external facility discharge? No Discharge Facility:     Challenges to be reviewed by the provider   Additional needs identified to be addressed with provider: No  none       Method of communication with provider : none      Advance Care Planning:   Does patient have an Advance Directive: not on file. Was this a readmission? No  Patient stated reason for admission: Flank pain  Patients top risk factors for readmission: lack of knowledge about disease  medical condition-Cystits, HTN    Care Transition Nurse (CTN) contacted the patient by telephone to perform post hospital discharge assessment. Verified name and  with patient as identifiers.  Provided introduction to self, and explanation of the CTN role. CTN reviewed discharge instructions, medical action plan and red flags with patient who verbalized understanding. Patient given an opportunity to ask questions and does not have any further questions or concerns at this time. Were discharge instructions available to patient? Yes. Reviewed appropriate site of care based on symptoms and resources available to patient including: PCP  Specialist  When to call 911. The patient agrees to contact the PCP office for questions related to their healthcare. Medication reconciliation was performed with patient, who verbalizes understanding of administration of home medications. Advised obtaining a 90-day supply of all daily and as-needed medications. Covid Risk Education     Educated patient about risk for severe COVID-19 due to risk factors according to CDC guidelines. CTN reviewed discharge instructions, medical action plan and red flag symptoms with the patient who verbalized understanding. Discussed COVID vaccination status: Yes. Education provided on COVID-19 vaccination as appropriate. Discussed exposure protocols and quarantine with CDC Guidelines. Patient was given an opportunity to verbalize any questions and concerns and agrees to contact CTN or health care provider for questions related to their healthcare. Reviewed and educated patient on any new and changed medications related to discharge diagnosis. Was patient discharged with a pulse oximeter? No Discussed and confirmed pulse oximeter discharge instructions and when to notify provider or seek emergency care. CTN provided contact information. Plan for follow-up call in 3-5 days based on severity of symptoms and risk factors.   Plan for next call: symptom management-Urinary incontinence  follow up appointment-PCP, Gastro  medication management-Taking as prescribed      Care Transitions 24 Hour Call    Care Transitions Interventions         Follow Up  Future Appointments   Date Time Provider Department Center   4/8/2022 11:30 AM Layo Lou MD [facility] EMERGENCY MEDICAL [facility] AT [place]   4/12/2022  1:45 PM Alexandra Short MD [address]   5/12/2022  1:30 PM Layo Lou MD [facility] EMERGENCY [facility] CENTER AT [place]   6/9/2022  9:45 AM Nitin Lowery MD [facility] EMERGENCY [facility] CENTER AT [place]   11/15/2022  3:00 PM Layo Lou MD [facility]       Ignacia Adam LPN

## 2022-04-08 ENCOUNTER — OFFICE VISIT (OUTPATIENT)
Dept: FAMILY MEDICINE CLINIC | Age: 62
End: 2022-04-08
Payer: MEDICARE

## 2022-04-08 ENCOUNTER — CARE COORDINATION (OUTPATIENT)
Dept: CARE COORDINATION | Age: 62
End: 2022-04-08

## 2022-04-08 VITALS
OXYGEN SATURATION: 98 % | HEIGHT: 70 IN | DIASTOLIC BLOOD PRESSURE: 80 MMHG | BODY MASS INDEX: 28.75 KG/M2 | TEMPERATURE: 97.7 F | HEART RATE: 7 BPM | WEIGHT: 200.8 LBS | SYSTOLIC BLOOD PRESSURE: 124 MMHG

## 2022-04-08 DIAGNOSIS — N13.9 URINARY OBSTRUCTION: Primary | ICD-10-CM

## 2022-04-08 DIAGNOSIS — R31.9 HEMATURIA, UNSPECIFIED TYPE: ICD-10-CM

## 2022-04-08 DIAGNOSIS — N30.01 ACUTE CYSTITIS WITH HEMATURIA: ICD-10-CM

## 2022-04-08 DIAGNOSIS — R35.0 URINARY FREQUENCY: ICD-10-CM

## 2022-04-08 PROCEDURE — 99495 TRANSJ CARE MGMT MOD F2F 14D: CPT | Performed by: FAMILY MEDICINE

## 2022-04-08 PROCEDURE — 1111F DSCHRG MED/CURRENT MED MERGE: CPT | Performed by: FAMILY MEDICINE

## 2022-04-08 RX ORDER — AMOXICILLIN AND CLAVULANATE POTASSIUM 875; 125 MG/1; MG/1
1 TABLET, FILM COATED ORAL 2 TIMES DAILY
Qty: 20 TABLET | Refills: 0 | Status: SHIPPED | OUTPATIENT
Start: 2022-04-08 | End: 2022-04-18

## 2022-04-08 RX ORDER — OXYBUTYNIN CHLORIDE 10 MG/1
10 TABLET, EXTENDED RELEASE ORAL EVERY EVENING
Qty: 30 TABLET | Refills: 0 | Status: SHIPPED | OUTPATIENT
Start: 2022-04-08 | End: 2022-04-28

## 2022-04-08 NOTE — PROGRESS NOTES
Post-Discharge Transitional Care  Follow Up      Samantha Yu   YOB: 1960    Date of Office Visit:  4/8/2022  Date of Hospital Admission: 3/24/22  Date of Hospital Discharge: 3/30/22  Risk of hospital readmission (high >=14%. Medium >=10%) :Readmission Risk Score: 10.5 ( )      Care management risk score Rising risk (score 2-5) and Complex Care (Scores >=6): 0     Non face to face  following discharge, date last encounter closed (first attempt may have been earlier): 4/1/2022  9:51 AM    Call initiated 2 business days of discharge: Yes    ASSESSMENT/PLAN:   Urinary obstruction  -     AK DISCHARGE MEDS RECONCILED W/ CURRENT OUTPATIENT MED LIST  Hematuria, unspecified type  -     AK DISCHARGE MEDS RECONCILED W/ CURRENT OUTPATIENT MED LIST  Acute cystitis with hematuria  -     AK DISCHARGE MEDS RECONCILED W/ CURRENT OUTPATIENT MED LIST      Medical Decision Making: moderate complexity  Return in 3 months (on 7/8/2022). On this date 4/8/2022 I have spent 20 minutes reviewing previous notes, test results and face to face with the patient discussing the diagnosis and importance of compliance with the treatment plan as well as documenting on the day of the visit. Subjective:   HPI:  Follow up of Hospital problems/diagnosis(es): hematuria, UTI, incontinence    Inpatient course: Discharge summary reviewed- see chart. Hospital Stay  Narrative of Hospital Course:  Patient comes with sepsis secondary to Enterococcus bacteremia was most likely due to urine. Received CIWA protocol due to withdrawal from alcohol. Received IV Vanco, then will switch to ampicillin.  Patient was seen urology for hydronephrosis and recommended patient to start on Flomax    Interval history/Current status: nocturia, multiple times, small amounts  Back pain  Eating and drinking  No f/u set with specialists yet     Patient Active Problem List   Diagnosis    Hematuria    Urinary obstruction    HTN (hypertension)    Abnormal finding on EKG    Flank pain    Acute cystitis with hematuria    Abnormal LFTs       Medications listed as ordered at the time of discharge from hospital     Medication List          Accurate as of April 8, 2022 11:54 AM. If you have any questions, ask your nurse or doctor.             CONTINUE taking these medications    amLODIPine 10 MG tablet  Commonly known as: NORVASC  take 1 tablet by mouth once daily     amoxicillin 500 MG capsule  Commonly known as: AMOXIL  Take 1 capsule by mouth every 8 hours for 14 days     metoprolol tartrate 25 MG tablet  Commonly known as: LOPRESSOR  take 1 tablet by mouth twice a day     Probiotic Acidophilus Tabs  Take 1 tablet by mouth daily     tamsulosin 0.4 MG capsule  Commonly known as: FLOMAX  Take 1 capsule by mouth daily              Medications marked \"taking\" at this time  Outpatient Medications Marked as Taking for the 4/8/22 encounter (Office Visit) with Layo Lou MD   Medication Sig Dispense Refill    amoxicillin (AMOXIL) 500 MG capsule Take 1 capsule by mouth every 8 hours for 14 days 42 capsule 0    Probiotic Acidophilus (FLORANEX) TABS Take 1 tablet by mouth daily 30 tablet 0    tamsulosin (FLOMAX) 0.4 MG capsule Take 1 capsule by mouth daily 30 capsule 3    amLODIPine (NORVASC) 10 MG tablet take 1 tablet by mouth once daily 90 tablet 3    metoprolol tartrate (LOPRESSOR) 25 MG tablet take 1 tablet by mouth twice a day 180 tablet 3        Medications patient taking as of now reconciled against medications ordered at time of hospital discharge: Yes    back pain acting up, urinary frequency    Objective:    /80 (Site: Left Upper Arm)   Pulse (!) 7   Temp 97.7 °F (36.5 °C)   Ht 5' 10\" (1.778 m)   Wt 200 lb 12.8 oz (91.1 kg)   SpO2 98%   BMI 28.81 kg/m²   Seems uncomfortable today and was a bit upset    Gen: Well, NAD, Alert, Oriented x 3   HEENT: EOMI, eyes clear, MMM  Skin: without rash or jaundice  Neck: no significant lymphadenopathy or thyromegaly  Lungs: CTA B w/out Rales/Wheezes/Rhonchi, Good respiratory effort   Heart: RRR, S1S2, w/out M/R/G, non-displaced PMI    Ext: No C/C/E Bilaterally. Neuro: Neurovascularly intact w/ Sensory/Motor intact UE/LE Bilaterally. Ambulates slowly with out-toeing gait, using cane          An electronic signature was used to authenticate this note.   --Gaudencio Jimenez MD

## 2022-04-08 NOTE — CARE COORDINATION
[facility] Transitions Follow Up Call    2022    Patient: Leeann Josue  Patient : 1960   MRN: [id number]  Reason for Admission: 3/25-3/30/22 Flank  Pain, Acute Cystitis with Hematuria, Abnormal LFTs  Discharge Date: 3/30/22 RARS: Readmission Risk Score: 10.5 ( )    Attempted to reach patient by telephone for subsequent Care Transitions call. Call within 2 business days of discharge: Yes Left HIPPA compliant message requesting a return call. Will attempt to reach patient again.     Mary Dobbins, CARLOS  [facility] / [address]  [phone]     Ofelia Cartwright F/U scheduled 22  PCP Hosp F/U completed 22   - Start: Ditropanciara Augmentin   - Stop: Amoxil    Care Transitions Subsequent and Final Call    Subsequent and Final Calls  Care Transitions Interventions  Other Interventions:            Follow Up  Future Appointments   Date Time Provider Melody Romero   2022  1:45 PM Jimmy Dorantes MD [facility]   2022  1:30 PM Dilan Jeronimo MD [facility] EMERGENCY MEDICAL CENTER AT [place]   2022  9:45 AM Kenzie Guerrero MD [facility] EMERGENCY [facility] AT [place]   11/15/2022  3:00 PM Dilan Jeronimo MD [address]

## 2022-04-12 ENCOUNTER — OFFICE VISIT (OUTPATIENT)
Dept: GASTROENTEROLOGY | Age: 62
End: 2022-04-12
Payer: MEDICARE

## 2022-04-12 VITALS
DIASTOLIC BLOOD PRESSURE: 78 MMHG | HEART RATE: 87 BPM | SYSTOLIC BLOOD PRESSURE: 122 MMHG | WEIGHT: 200 LBS | OXYGEN SATURATION: 97 % | BODY MASS INDEX: 28.7 KG/M2 | RESPIRATION RATE: 12 BRPM

## 2022-04-12 DIAGNOSIS — R79.89 ABNORMAL LFTS: Primary | ICD-10-CM

## 2022-04-12 PROCEDURE — 99214 OFFICE O/P EST MOD 30 MIN: CPT | Performed by: INTERNAL MEDICINE

## 2022-04-12 ASSESSMENT — ENCOUNTER SYMPTOMS
CONSTIPATION: 0
SHORTNESS OF BREATH: 0
COLOR CHANGE: 0
ABDOMINAL PAIN: 0
VOICE CHANGE: 0
PHOTOPHOBIA: 0
NAUSEA: 0
TROUBLE SWALLOWING: 0
EYE REDNESS: 0
DIARRHEA: 0
ABDOMINAL DISTENTION: 0
CHEST TIGHTNESS: 0
RECTAL PAIN: 0
WHEEZING: 0
VOMITING: 0
BLOOD IN STOOL: 0
EYE PAIN: 0

## 2022-04-12 NOTE — PROGRESS NOTES
Subjective:      Patient ID: Alice Dubon is a 63 y.o. male who presents today for:  Chief Complaint   Patient presents with    Follow-up       HPI  This is a very pleasant 61-year-old was seen during inpatient hospital stay because of abnormal liver enzymes. Noted at the time patient was admitted because of Enterococcus bacteremia secondary to UTI. Patient does report daily alcohol use check 10year-old annual CO protocol during hospital stay. Had initial blood work and viral studies were negative for viral etiologies. Had imaging tests were negative for advanced fibrosis/cirrhosis. No radiological evidence of portal hypertension reported. Patient came in today for follow-up post discharge from the hospital.  Denies acute abdominal GERD. Denies hematemesis, melena hematochezia. Patient came in today to establish care  Past Medical History:   Diagnosis Date    Abnormal finding on EKG 2017    Chronic back pain     Erectile dysfunction     HTN (hypertension) 2017    Sleep apnea     Substance abuse ([facility] [address]     Urinary incontinence      Past Surgical History:   Procedure Laterality Date    CYSTOSCOPY  2017    [facility]  BILATERAL RETROGRADE PYELOGRAMS BLADDER BX FULGURATION    FRACTURE SURGERY      LEG SURGERY Bilateral     screws and plates in both after broken      Social History     Socioeconomic History    Marital status: Single     Spouse name: Not on file    Number of children: Not on file    Years of education: Not on file    Highest education level: Not on file   Occupational History    Not on file   Tobacco Use    Smoking status: Former Smoker     Packs/day: 1.00     Years: 20.00     Pack years: 20.00     Types: Cigarettes     Quit date: 12/15/2021     Years since quittin.3    Smokeless tobacco: Never Used   Substance and Sexual Activity    Alcohol use:  Yes     Alcohol/week: 5.0 standard drinks     Types: 5 Shots of liquor per week     Comment: socially    Drug use: Yes     Frequency: 1.0 times per week     Types: Marijuana Dauna Other)     Comment: daily    Sexual activity: Not on file   Other Topics Concern    Not on file   Social History Narrative    Not on file     Social Determinants of Health     Financial Resource Strain: Low Risk     Difficulty of Paying Living Expenses: Not very hard   Food Insecurity: No Food Insecurity    Worried About Running Out of Food in the Last Year: Never true    Ching of Food in the Last Year: Never true   Transportation Needs:     Lack of Transportation (Medical): Not on file    Lack of Transportation (Non-Medical): Not on file   Physical Activity:     Days of Exercise per Week: Not on file    Minutes of Exercise per Session: Not on file   Stress:     Feeling of Stress : Not on file   Social Connections:     Frequency of Communication with Friends and Family: Not on file    Frequency of Social Gatherings with Friends and Family: Not on file    Attends [employer] Services: Not on file    Active Member of [address] [place] [address] or Organizations: Not on file    Attends Club or Organization Meetings: Not on file    Marital Status: Not on file   Intimate Partner Violence:     Fear of Current or Ex-Partner: Not on file    Emotionally Abused: Not on file    Physically Abused: Not on file    Sexually Abused: Not on file   Housing Stability:     Unable to Pay for Housing in the Last Year: Not on file    Number of [place] in the Last Year: Not on file    Unstable Housing in the Last Year: Not on file     No family history on file. Allergies   Allergen Reactions    Statins     Proscar [Finasteride] Hives and Swelling         Review of Systems   Constitutional: Negative for appetite change, chills, fatigue, fever and unexpected weight change. HENT: Negative for nosebleeds, tinnitus, trouble swallowing and voice change. Eyes: Negative for photophobia, pain and redness.    Respiratory: Negative for chest tightness, shortness of breath and wheezing. Cardiovascular: Negative for chest pain, palpitations and leg swelling. Gastrointestinal: Negative for abdominal distention, abdominal pain, blood in stool, constipation, diarrhea, nausea, rectal pain and vomiting. Endocrine: Negative for polydipsia, polyphagia and polyuria. Genitourinary: Negative for difficulty urinating and hematuria. Skin: Negative for color change, pallor and rash. Neurological: Negative for dizziness, speech difficulty and headaches. Psychiatric/Behavioral: Negative for confusion and suicidal ideas. Objective:   /78 (Site: Left Upper Arm, Position: Sitting, Cuff Size: Small Adult)   Pulse 87   Resp 12   Wt 200 lb (90.7 kg)   SpO2 97%   BMI 28.70 kg/m²     Physical Exam  Constitutional:       General: He is not in acute distress. Appearance: He is well-developed. HENT:      Head: Normocephalic and atraumatic. Eyes:      Conjunctiva/sclera: Conjunctivae normal.      Pupils: Pupils are equal, round, and reactive to light. Cardiovascular:      Rate and Rhythm: Normal rate and regular rhythm. Heart sounds: Normal heart sounds. Pulmonary:      Effort: Pulmonary effort is normal. No respiratory distress. Breath sounds: Normal breath sounds. No wheezing or rales. Abdominal:      General: Bowel sounds are normal. There is no distension. Palpations: Abdomen is soft. Abdomen is not rigid. There is no hepatomegaly, splenomegaly or mass. Tenderness: There is no abdominal tenderness. There is no guarding or rebound. Musculoskeletal:         General: No tenderness or deformity. Normal range of motion. Cervical back: Neck supple. Skin:     Coloration: Skin is not pale. Findings: No erythema or rash. Neurological:      Mental Status: He is alert and oriented to person, place, and time.          Laboratory, Pathology, Radiology reviewed in detail with relevantimportant investigations summarized below:  Lab Results Component Value Date    WBC 7.5 03/30/2022    WBC 5.5 03/29/2022    WBC 13.4 03/25/2022    WBC 5.7 03/24/2022    WBC 9.5 01/26/2021    HGB 13.1 03/30/2022    HGB 12.7 03/29/2022    HGB 12.9 03/25/2022    HGB 15.7 03/24/2022    HGB 16.6 01/26/2021    HCT 39.5 03/30/2022    HCT 37.5 03/29/2022    HCT 38.5 03/25/2022    HCT 47.1 03/24/2022    HCT 50.5 01/26/2021    MCV 93.0 03/30/2022    MCV 91.6 03/29/2022    MCV 92.3 03/25/2022    MCV 93.9 03/24/2022    MCV 93.1 01/26/2021     03/30/2022     03/29/2022     03/25/2022     03/24/2022     01/26/2021    . Lab Results   Component Value Date     03/30/2022     03/29/2022    ALT 94 03/28/2022    AST 78 03/30/2022     03/29/2022    AST 84 03/28/2022    ALKPHOS 128 03/30/2022    ALKPHOS 133 03/29/2022    ALKPHOS 139 03/28/2022    BILITOT 0.3 03/30/2022    BILITOT 0.3 03/29/2022    BILITOT 0.4 03/28/2022       CT ABDOMEN PELVIS W IV CONTRAST Additional Contrast? None    Result Date: 3/24/2022  EXAMINATION: CT ABDOMEN PELVIS W IV CONTRAST HISTORY:  hematuria; right flan k pain  COMPARISON: CT abdomen pelvis from January 14, 2017 TECHNIQUE: Contiguous axial CT sections of the abdomen and pelvis. Imaging was obtained after IV contrast administration. .  Sagittal and coronal reformats have been obtained. All CT scans at this facility use dose modulation, iterative reconstruction, and/or weight based dosing when appropriate to reduce radiation dose to as low as reasonably achievable. FINDINGS This study is mildly limited due to motion artifact. Lung bases:Visualized lung bases show no significant pathology Liver: The liver is normal in size and enhancement. There are no focal solid or cystic lesions. There is no intra or extrahepatic bile duct dilatation. Gallbladder: No calcified gallstones. Normal gallbladder wall. No pericholecystic fluid. Spleen: There are no focal lesions or calcifications in the spleen.  There is no splenomegaly  Pancreas: The pancreas is normal in size and attenuation without focal lesions or dilatation of the pancreatic duct. Adrenal glands are negative. Kidneys: There are no solid renal lesions. There are prompt bilateral nephrograms after IV contrast administration with prompt excretion. There are mildly prominent bilateral renal collecting systems and mildly prominent ureters, hydronephrosis and mild hydroureter. There are no radiopaque stones The urinary bladder contains no radiopaque filling defects. There is mild thickening of the urinary bladder wall measuring up to 10 mm. The prostate gland is prominent and there are heavy calcifications of prostate gland similar to the prior study. Bowel: There is no bowel dilatation or evidence of diverticulitis. Appendix: There  is no CT evidence for appendicitis. Nodes: No lymphadenopathy. Aorta: There is no abdominal aortic aneurysm. Peritoneum: No free fluid or free air. Abdominal wall: There are bilateral inguinal hernias containing fat measuring 2.4 cm on the right and 2.0 cm on the left. Bones : There are no acute osseous changes. Soft tissues: The soft tissues are unremarkable. There is mild bilateral hydronephrosis and hydroureter with thickening of the wall of the urinary bladder. There are no radiopaque renal stones or bladder stones. There is mild hypertrophy of the prostate gland with prostatic calcifications similar to the prior study. The findings may be secondary to bladder outlet obstruction and vesicoureteral reflux. US DUP UPPER EXTREMITY LEFT VENOUS    Result Date: 3/28/2022  LEFT UPPER EXTREMITY DEEP VENOUS ULTRASOUND WITH DOPPLER IMAGING CLINICAL HISTORY:  Upper extremity swelling COMPARISON: None TECHNIQUE:  Doristine Hermanns scale ultrasound with compression maneuvers where accessible, color and spectral Doppler of the left internal jugular, subclavian, axillary and brachial veins was performed.   The right internal jugular and distal subclavian vein were imaged for comparison. Grey scale with and without compression of the left basilic and cephalic veins was performed. Images were obtained and stored in a permanent archive. RESULT: LEFT UPPER EXTREMITY DEEP VEINS: Internal Jugular vein: Normal compression, normal spontaneous flow, Subclavian vein:  Normal, spontaneous flow, Axillary vein:  Normal compression, normal spontaneous flow, Brachial vein:  Normal compression, normal spontaneous flow, LEFT UPPER EXTREMITY SUPERFICIAL VEINS: Basilic vein: Normal compression Cephalic vein:  Normal compression RIGHT UPPER EXTREMITY DEEP VEINS (FOR COMPARISON): Internal Jugular and distal subclavian veins:Normal compression, normal spontaneous flow,     NEGATIVE STUDY FOR ACUTE DVT IN THE LEFT UPPER EXTREMITY. NEGATIVE STUDY FOR SUPERFICIAL THROMBOPHLEBITIS IN THE LEFT UPPER EXTREMITY     No results found for: IRON, TIBC, FERRITIN  Lab Results   Component Value Date    INR 0.9 03/30/2022    INR 0.9 03/29/2022    INR 0.9 03/28/2022    INR 1.0 10/19/2011     No components found for: ACUTEHEPATITISSCREEN  No components found for: CELIACPANEL  No components found for: STOOLCULTURE, C.DIFF, STOOLOVAPARASITE, STOOLLEUCOCYTE        Assessment:       Diagnosis Orders   1. Abnormal LFTs  Comprehensive Metabolic Panel    US FIBROSCAN         Plan:      Orders Placed This Encounter   Procedures    US FIBROSCAN     Standing Status:   Future     Standing Expiration Date:   4/12/2023    Comprehensive Metabolic Panel     Standing Status:   Future     Standing Expiration Date:   4/12/2023     No orders of the defined types were placed in this encounter. A/P  1. Abnormal LFTs:  Patient was evaluated during recent inpatient stay at the time patient was admitted owing to Enterococcus bacteremia secondary to UTI. Also reports daily alcohol use prior to admission.   Patient is currently completing course of antibiotic regimen  Obtain liver function test for further assessment  Had viral studies during recent hospital stay that were negative. Hepatotropic viruses were checked as well were negative  2- Chronic liver disease staging:  No clinical or lab data suggestive of advanced liver disease   Obtain fibroscan for fibrosis staging and assessment  3-Associated medical conditions include but not limited to Enterococcus bacteremia, hypertension, URIEL, history of substance abuse, chronic pain, erectile dysfunction history of significant alcohol use, recent UTI/sepsis with Enterococcus bacteremia. Had colonoscopy at [facility] and was recommended repeat endoscopy in 2023. Record of previous colonoscopy from [facility] not available at this time        Return in about 6 weeks (around 5/24/2022) for further management, Post procedure results discussion.       Janis Whitaker MD

## 2022-04-15 ENCOUNTER — CARE COORDINATION (OUTPATIENT)
Dept: CARE COORDINATION | Age: 62
End: 2022-04-15

## 2022-04-15 NOTE — CARE COORDINATION
[facility] Transitions Follow Up Call    4/15/2022    Patient: Nakul Grossman  Patient : 1960   MRN: [id number]  Reason for Admission: 3/25-3/30/22 Flank  Pain, Acute Cystitis with Hematuria, Abnormal LFTs  Discharge Date: 3/30/22 RARS: Readmission Risk Score: 10.5 ( )       Second attempt made to contact patient for subsequent Care Transition call. Left HIPPA compliant message requesting return call. CTN will sign off. Mary Dobbins LPN  [address] [place] [address] / [facility] [address]  [phone]    PCP Hosp F/U completed 2022  Gastrology Post Op completed 2022      Care Transitions Subsequent and Final Call    Subsequent and Final Calls  Care Transitions Interventions  Other Interventions:            Follow Up  Future Appointments   Date Time Provider Melody Romero   2022  1:30 PM Dyan Lesches, MD [facility] EMERGENCY [facility] [place]   2022  1:45 PM SHREYA Dubon [facility]   2022  9:45 AM Kelli Willingham MD [facility] EMERGENCY [facility] [place]   11/15/2022  3:00 PM Dyan Lesches, MD [address]

## 2022-04-22 ENCOUNTER — TELEPHONE (OUTPATIENT)
Dept: FAMILY MEDICINE CLINIC | Age: 62
End: 2022-04-22

## 2022-04-27 DIAGNOSIS — R35.0 URINARY FREQUENCY: ICD-10-CM

## 2022-04-28 RX ORDER — OXYBUTYNIN CHLORIDE 10 MG/1
10 TABLET, EXTENDED RELEASE ORAL EVERY EVENING
Qty: 30 TABLET | Refills: 0 | Status: SHIPPED | OUTPATIENT
Start: 2022-04-28 | End: 2022-05-12 | Stop reason: SDUPTHER

## 2022-05-10 ENCOUNTER — TELEPHONE (OUTPATIENT)
Dept: FAMILY MEDICINE CLINIC | Age: 62
End: 2022-05-10

## 2022-05-12 ENCOUNTER — OFFICE VISIT (OUTPATIENT)
Dept: FAMILY MEDICINE CLINIC | Age: 62
End: 2022-05-12
Payer: MEDICARE

## 2022-05-12 VITALS
DIASTOLIC BLOOD PRESSURE: 82 MMHG | OXYGEN SATURATION: 97 % | TEMPERATURE: 97.7 F | WEIGHT: 203.6 LBS | HEIGHT: 70 IN | HEART RATE: 78 BPM | SYSTOLIC BLOOD PRESSURE: 136 MMHG | BODY MASS INDEX: 29.15 KG/M2

## 2022-05-12 DIAGNOSIS — R35.0 BENIGN PROSTATIC HYPERPLASIA WITH URINARY FREQUENCY: ICD-10-CM

## 2022-05-12 DIAGNOSIS — I80.9 THROMBOPHLEBITIS: ICD-10-CM

## 2022-05-12 DIAGNOSIS — R35.0 URINARY FREQUENCY: ICD-10-CM

## 2022-05-12 DIAGNOSIS — N13.9 URINARY OBSTRUCTION: ICD-10-CM

## 2022-05-12 DIAGNOSIS — I10 ESSENTIAL HYPERTENSION: Primary | ICD-10-CM

## 2022-05-12 DIAGNOSIS — N40.1 BENIGN PROSTATIC HYPERPLASIA WITH URINARY FREQUENCY: ICD-10-CM

## 2022-05-12 PROCEDURE — 99214 OFFICE O/P EST MOD 30 MIN: CPT | Performed by: FAMILY MEDICINE

## 2022-05-12 RX ORDER — OXYBUTYNIN CHLORIDE 10 MG/1
10 TABLET, EXTENDED RELEASE ORAL EVERY EVENING
Qty: 90 TABLET | Refills: 3 | Status: SHIPPED | OUTPATIENT
Start: 2022-05-12

## 2022-05-12 RX ORDER — TAMSULOSIN HYDROCHLORIDE 0.4 MG/1
0.4 CAPSULE ORAL DAILY
Qty: 90 CAPSULE | Refills: 3 | Status: SHIPPED | OUTPATIENT
Start: 2022-05-12

## 2022-05-12 ASSESSMENT — PATIENT HEALTH QUESTIONNAIRE - PHQ9
SUM OF ALL RESPONSES TO PHQ QUESTIONS 1-9: 0
SUM OF ALL RESPONSES TO PHQ QUESTIONS 1-9: 0
2. FEELING DOWN, DEPRESSED OR HOPELESS: 0
SUM OF ALL RESPONSES TO PHQ9 QUESTIONS 1 & 2: 0
1. LITTLE INTEREST OR PLEASURE IN DOING THINGS: 0
SUM OF ALL RESPONSES TO PHQ QUESTIONS 1-9: 0
SUM OF ALL RESPONSES TO PHQ QUESTIONS 1-9: 0

## 2022-05-12 NOTE — PROGRESS NOTES
Chief Complaint   Patient presents with    Hypertension     6 month, still feels hard where they put iV in, \"feels like  bee sting\"        HPI:  Auther Sacks is a 63 y.o. male    Checkup/follow up  Lopressor/norvasc for HTN    Also in f/u from recent hospital stay  He has persistent vein irritation left arm where IV was    Known BPH history  flomax  Ditropan  Working well    Feeling a little foggy/fatigued    States he has had them before    Actually had workup for hematuria in past as well    History of \"nerve damage\"    Pain in lateral leg, sharp    Still smokes cigarettes and marijuana     Past Medical History:   Diagnosis Date    Abnormal finding on EKG 2017    Chronic back pain     Erectile dysfunction     HTN (hypertension) 2017    Sleep apnea     Substance abuse ([address]     Urinary incontinence      Past Surgical History:   Procedure Laterality Date    CYSTOSCOPY  2017    [facility] MD  BILATERAL RETROGRADE PYELOGRAMS BLADDER BX FULGURATION    FRACTURE SURGERY      LEG SURGERY Bilateral     screws and plates in both after broken      History reviewed. No pertinent family history. Social History     Socioeconomic History    Marital status: Single     Spouse name: None    Number of children: None    Years of education: None    Highest education level: None   Occupational History    None   Tobacco Use    Smoking status: Former Smoker     Packs/day: 1.00     Years: 20.00     Pack years: 20.00     Types: Cigarettes     Quit date: 12/15/2021     Years since quittin.4    Smokeless tobacco: Never Used   Substance and Sexual Activity    Alcohol use:  Yes     Alcohol/week: 5.0 standard drinks     Types: 5 Shots of liquor per week     Comment: socially    Drug use: Yes     Frequency: 1.0 times per week     Types: Marijuana [place])     Comment: daily    Sexual activity: None   Other Topics Concern    None   Social History Narrative    None     Social Determinants of Health Financial Resource Strain: Low Risk     Difficulty of Paying Living Expenses: Not very hard   Food Insecurity: No Food Insecurity    Worried About Running Out of Food in the Last Year: Never true    Ching of Food in the Last Year: Never true   Transportation Needs:     Lack of Transportation (Medical): Not on file    Lack of Transportation (Non-Medical): Not on file   Physical Activity:     Days of Exercise per Week: Not on file    Minutes of Exercise per Session: Not on file   Stress:     Feeling of Stress : Not on file   Social Connections:     Frequency of Communication with Friends and Family: Not on file    Frequency of Social Gatherings with Friends and Family: Not on file    Attends Mu-ism Services: Not on file    Active Member of [address] [place] [address] [employer] or Organizations: Not on file    Attends Club or Organization Meetings: Not on file    Marital Status: Not on file   Intimate Partner Violence:     Fear of Current or Ex-Partner: Not on file    Emotionally Abused: Not on file    Physically Abused: Not on file    Sexually Abused: Not on file   Housing Stability:     Unable to Pay for Housing in the Last Year: Not on file    Number of [place] in the Last Year: Not on file    Unstable Housing in the Last Year: Not on file     Current Outpatient Medications   Medication Sig Dispense Refill    oxybutynin (DITROPAN-XL) 10 MG extended release tablet Take 1 tablet by mouth every evening 90 tablet 3    tamsulosin (FLOMAX) 0.4 MG capsule Take 1 capsule by mouth daily 90 capsule 3    amLODIPine (NORVASC) 10 MG tablet take 1 tablet by mouth once daily 90 tablet 3    metoprolol tartrate (LOPRESSOR) 25 MG tablet take 1 tablet by mouth twice a day 180 tablet 3     No current facility-administered medications for this visit.      Allergies   Allergen Reactions    Statins     Proscar [Finasteride] Hives and Swelling       Review of Systems:   General ROS: some fatigue  Respiratory ROS: no cough, shortness of breath, or wheezing  Cardiovascular ROS: no chest pain or dyspnea on exertion  Gastrointestinal ROS: no abdominal pain, change in bowel habits, or black or bloody stools  Genito-Urinary ROS:dysuria  Musculoskeletal ROS: walks with cane, hip pain, history of bilateral leg fractures  Neurological ROS: reports chronic balance difficulty    In general patient otherwise reports feeling well. Physical Exam:  /82 (Site: Left Upper Arm)   Pulse 78   Temp 97.7 °F (36.5 °C)   Ht 5' 10\" (1.778 m)   Wt 203 lb 9.6 oz (92.4 kg)   SpO2 97%   BMI 29.21 kg/m²     Gen: Well, NAD, Alert, Oriented x 3   HEENT: EOMI, eyes clear, MMM, poor dentition  Skin: without rash or jaundice  Neck: no significant lymphadenopathy or thyromegaly  Lungs: CTA B w/out Rales/Wheezes/Rhonchi, Good respiratory effort   Heart: RRR, S1S2, w/out M/R/G, non-displaced PMI   Abdomen: Soft NT/ND, w/out R/G, w/ +BSx4   Ext: No C/C/E Bilaterally. Neuro: Neurovascularly intact w/ Sensory/Motor intact UE/LE Bilaterally. Left forearm with palpable cord, likely superficial thrombophlebitis     Lab Results   Component Value Date    WBC 7.5 03/30/2022    HGB 13.1 (L) 03/30/2022    HCT 39.5 (L) 03/30/2022     03/30/2022    CHOL 201 (H) 01/26/2021    TRIG 171 (H) 01/26/2021    HDL 27 (L) 01/26/2021     (H) 03/30/2022    AST 78 (H) 03/30/2022     03/30/2022    K 4.3 03/30/2022     03/30/2022    CREATININE 0.77 03/30/2022    BUN 13 03/30/2022    CO2 21 03/30/2022    TSH 3.300 11/27/2017    PSA 0.59 01/26/2021    INR 0.9 03/30/2022     No results found for this visit on 05/12/22. A&P   Diagnosis Orders   1. Essential hypertension     2. Thrombophlebitis     3. Urinary frequency  oxybutynin (DITROPAN-XL) 10 MG extended release tablet    tamsulosin (FLOMAX) 0.4 MG capsule   4. Urinary obstruction     5.  Benign prostatic hyperplasia with urinary frequency  tamsulosin (FLOMAX) 0.4 MG capsule       Will monitor bp at home    Still smoking unfortunately     Continue with current medications    Refills given     Aspirin BID  Warm compresses to left forearm        Mey Brown MD

## 2022-05-24 ENCOUNTER — OFFICE VISIT (OUTPATIENT)
Dept: GASTROENTEROLOGY | Age: 62
End: 2022-05-24
Payer: MEDICARE

## 2022-05-24 VITALS
SYSTOLIC BLOOD PRESSURE: 116 MMHG | WEIGHT: 206 LBS | BODY MASS INDEX: 29.49 KG/M2 | HEIGHT: 70 IN | DIASTOLIC BLOOD PRESSURE: 74 MMHG | TEMPERATURE: 96.8 F

## 2022-05-24 DIAGNOSIS — R12 HEART BURN: Primary | ICD-10-CM

## 2022-05-24 DIAGNOSIS — R79.89 ABNORMAL LFTS: ICD-10-CM

## 2022-05-24 LAB
ALBUMIN SERPL-MCNC: 4.1 G/DL (ref 3.5–4.6)
ALP BLD-CCNC: 87 U/L (ref 35–104)
ALT SERPL-CCNC: 18 U/L (ref 0–41)
ANION GAP SERPL CALCULATED.3IONS-SCNC: 12 MEQ/L (ref 9–15)
AST SERPL-CCNC: 22 U/L (ref 0–40)
BILIRUB SERPL-MCNC: <0.2 MG/DL (ref 0.2–0.7)
BUN BLDV-MCNC: 11 MG/DL (ref 8–23)
CALCIUM SERPL-MCNC: 9.1 MG/DL (ref 8.5–9.9)
CHLORIDE BLD-SCNC: 102 MEQ/L (ref 95–107)
CO2: 24 MEQ/L (ref 20–31)
CREAT SERPL-MCNC: 0.91 MG/DL (ref 0.7–1.2)
GFR AFRICAN AMERICAN: >60
GFR NON-AFRICAN AMERICAN: >60
GLOBULIN: 2.8 G/DL (ref 2.3–3.5)
GLUCOSE BLD-MCNC: 120 MG/DL (ref 70–99)
POTASSIUM SERPL-SCNC: 4.7 MEQ/L (ref 3.4–4.9)
SODIUM BLD-SCNC: 138 MEQ/L (ref 135–144)
TOTAL PROTEIN: 6.9 G/DL (ref 6.3–8)

## 2022-05-24 PROCEDURE — 99214 OFFICE O/P EST MOD 30 MIN: CPT | Performed by: NURSE PRACTITIONER

## 2022-05-24 RX ORDER — OMEPRAZOLE 40 MG/1
40 CAPSULE, DELAYED RELEASE ORAL
Qty: 90 CAPSULE | Refills: 1 | Status: SHIPPED | OUTPATIENT
Start: 2022-05-24

## 2022-05-24 ASSESSMENT — ENCOUNTER SYMPTOMS
COLOR CHANGE: 0
VOICE CHANGE: 0
NAUSEA: 0
ABDOMINAL PAIN: 1
EYE PAIN: 0
ABDOMINAL DISTENTION: 0
CONSTIPATION: 0
VOMITING: 0
PHOTOPHOBIA: 0
CHEST TIGHTNESS: 0
EYE REDNESS: 0
TROUBLE SWALLOWING: 0
DIARRHEA: 0
BLOOD IN STOOL: 0
ANAL BLEEDING: 0
RECTAL PAIN: 0

## 2022-05-24 NOTE — PROGRESS NOTES
Subjective:      Patient ID: Maribel Varela is a 63 y.o. male who presents today for:  Chief Complaint   Patient presents with    Follow-up     patient says he's still puking in his mouth       HPI   Patient came in as follow-up to abnormal LFTs, was initially seen during hospital stay in the context of Enterococcus bacteremia secondary to UTI and daily alcohol use. Viral hepatitis studies were negative, had FibroScan which noted no advanced fibrosis or cirrhosis. Has no clinical or lab data suggestive of advanced fibrosis. Previously had recommended repeat liver enzymes which have not been completed. Patient does have new complaints today of increased epigastric heartburn with nocturnal symptoms, is not on a PPI, no history of EGD, no nausea or vomiting, hematemesis, melena, or hematochezia. Background  This is a very pleasant 30-year-old was seen during inpatient hospital stay because of abnormal liver enzymes. Noted at the time patient was admitted because of Enterococcus bacteremia secondary to UTI. Patient does report daily alcohol use check 8year-old annual CO protocol during hospital stay. Had initial blood work and viral studies were negative for viral etiologies. Had imaging tests were negative for advanced fibrosis/cirrhosis. No radiological evidence of portal hypertension reported. Patient came in today for follow-up post discharge from the hospital.  Denies acute abdominal GERD. Denies hematemesis, melena hematochezia.   Patient came in today to establish care  Past Medical History:   Diagnosis Date    Abnormal finding on EKG 7/11/2017    Chronic back pain     Erectile dysfunction     HTN (hypertension) 1/13/2017    Sleep apnea     Substance abuse ([facility] [address]     Urinary incontinence      Past Surgical History:   Procedure Laterality Date    CYSTOSCOPY  06/07/2017    [facility] SKIM MD  BILATERAL RETROGRADE PYELOGRAMS BLADDER BX FULGURATION    FRACTURE SURGERY      LEG SURGERY Bilateral screws and plates in both after broken      Social History     Socioeconomic History    Marital status: Single     Spouse name: Not on file    Number of children: Not on file    Years of education: Not on file    Highest education level: Not on file   Occupational History    Not on file   Tobacco Use    Smoking status: Former Smoker     Packs/day: 1.00     Years: 20.00     Pack years: 20.00     Types: Cigarettes     Quit date: 12/15/2021     Years since quittin.4    Smokeless tobacco: Never Used   Substance and Sexual Activity    Alcohol use: Yes     Alcohol/week: 5.0 standard drinks     Types: 5 Shots of liquor per week     Comment: socially    Drug use: Yes     Frequency: 1.0 times per week     Types: Marijuana Tamy Awdeidra)     Comment: daily    Sexual activity: Not on file   Other Topics Concern    Not on file   Social History Narrative    Not on file     Social Determinants of Health     Financial Resource Strain: Low Risk     Difficulty of Paying Living Expenses: Not very hard   Food Insecurity: No Food Insecurity    Worried About Running Out of Food in the Last Year: Never true    Ching of Food in the Last Year: Never true   Transportation Needs:     Lack of Transportation (Medical): Not on file    Lack of Transportation (Non-Medical):  Not on file   Physical Activity:     Days of Exercise per Week: Not on file    Minutes of Exercise per Session: Not on file   Stress:     Feeling of Stress : Not on file   Social Connections:     Frequency of Communication with Friends and Family: Not on file    Frequency of Social Gatherings with Friends and Family: Not on file    Attends Confucianism Services: Not on file    Active Member of Clubs or Organizations: Not on file    Attends Club or Organization Meetings: Not on file    Marital Status: Not on file   Intimate Partner Violence:     Fear of Current or Ex-Partner: Not on file    Emotionally Abused: Not on file    Physically Abused: Not on file    Sexually Abused: Not on file   Housing Stability:     Unable to Pay for Housing in the Last Year: Not on file    Number of Places Lived in the Last Year: Not on file    Unstable Housing in the Last Year: Not on file     No family history on file. Allergies   Allergen Reactions    Statins     Proscar [Finasteride] Hives and Swelling         Review of Systems   Constitutional: Negative. Negative for chills, fatigue and fever. HENT: Negative for nosebleeds, tinnitus, trouble swallowing and voice change. Eyes: Negative for photophobia, pain and redness. Respiratory: Negative for chest tightness. Cardiovascular: Negative for chest pain, palpitations and leg swelling. Gastrointestinal: Positive for abdominal pain (epigastric pain). Negative for abdominal distention, anal bleeding, blood in stool, constipation, diarrhea, nausea, rectal pain and vomiting. Endocrine: Negative for polydipsia, polyphagia and polyuria. Genitourinary: Negative for difficulty urinating and hematuria. Skin: Negative for color change, pallor and rash. Neurological: Negative for dizziness, speech difficulty and headaches. Psychiatric/Behavioral: Negative for confusion, sleep disturbance and suicidal ideas. Objective:   /74   Temp 96.8 °F (36 °C) (Temporal)   Ht 5' 10\" (1.778 m)   Wt 206 lb (93.4 kg)   BMI 29.56 kg/m²     Physical Exam  Vitals reviewed. Constitutional:       Appearance: Normal appearance. HENT:      Head: Normocephalic and atraumatic. Nose: Nose normal.   Eyes:      Extraocular Movements: Extraocular movements intact. Conjunctiva/sclera: Conjunctivae normal.      Pupils: Pupils are equal, round, and reactive to light. Cardiovascular:      Rate and Rhythm: Normal rate and regular rhythm. Pulses: Normal pulses. Heart sounds: Normal heart sounds. Pulmonary:      Effort: Pulmonary effort is normal.      Breath sounds: Normal breath sounds.    Abdominal: General: Abdomen is flat. Bowel sounds are normal. There is no distension. Palpations: There is no hepatomegaly, splenomegaly or mass. Tenderness: There is no abdominal tenderness. There is no guarding. Hernia: No hernia is present. Musculoskeletal:         General: No swelling or tenderness. Normal range of motion. Cervical back: Neck supple. Skin:     General: Skin is warm and dry. Coloration: Skin is not jaundiced. Findings: No erythema or rash. Neurological:      General: No focal deficit present. Mental Status: He is alert and oriented to person, place, and time. Psychiatric:         Mood and Affect: Mood normal.         Behavior: Behavior normal.         Laboratory, Pathology, Radiology reviewed in detail with relevantimportant investigations summarized below:  Lab Results   Component Value Date    WBC 7.5 03/30/2022    WBC 5.5 03/29/2022    WBC 13.4 03/25/2022    WBC 5.7 03/24/2022    WBC 9.5 01/26/2021    HGB 13.1 03/30/2022    HGB 12.7 03/29/2022    HGB 12.9 03/25/2022    HGB 15.7 03/24/2022    HGB 16.6 01/26/2021    HCT 39.5 03/30/2022    HCT 37.5 03/29/2022    HCT 38.5 03/25/2022    HCT 47.1 03/24/2022    HCT 50.5 01/26/2021    MCV 93.0 03/30/2022    MCV 91.6 03/29/2022    MCV 92.3 03/25/2022    MCV 93.9 03/24/2022    MCV 93.1 01/26/2021     03/30/2022     03/29/2022     03/25/2022     03/24/2022     01/26/2021    . Lab Results   Component Value Date     03/30/2022     03/29/2022    ALT 94 03/28/2022    AST 78 03/30/2022     03/29/2022    AST 84 03/28/2022    ALKPHOS 128 03/30/2022    ALKPHOS 133 03/29/2022    ALKPHOS 139 03/28/2022    BILITOT 0.3 03/30/2022    BILITOT 0.3 03/29/2022    BILITOT 0.4 03/28/2022       No results found.   No results found for: IRON, TIBC, FERRITIN  Lab Results   Component Value Date    INR 0.9 03/30/2022    INR 0.9 03/29/2022    INR 0.9 03/28/2022    INR 1.0 10/19/2011     No components found for: ACUTEHEPATITISSCREEN  No components found for: CELIACPANEL  No components found for: STOOLCULTURE, C.DIFF, STOOLOVAPARASITE, STOOLLEUCOCYTE        Assessment:       Diagnosis Orders   1. Heart burn  EGD    omeprazole (PRILOSEC) 40 MG delayed release capsule         Plan:   1. Heartburn / GERD  = initiate PPI, advised on the correct dose and timing of the PPI, 20 to 30 min before breakfast   = Pathophysiology and etiology of reflux discussed at length  = Lifestyle modification recommended and discussed with the patient   --Avoid spicy and acidic based foods   --Limit coffee, tea, alcohol use   --Limit the amount of food during meal time   --Avoid bedtime snacks and eat meals 3 to 4 hrs before lying down   --Elevate the head of the bed    --Keep healthy weight  EGD requested to assess for heartburn/GERD related complication. Assess the presence of hiatal hernia. The upper endoscopy procedure, complications, risk and benefit were reviewed. Patient would like to proceed accordingly. 2.  Abnormal LFTs  Patient was evaluated during recent inpatient stay at the time patient was admitted owing to Enterococcus bacteremia secondary to UTI. Also reported daily alcohol use prior to admission, has been abstinent since hospitalization. Patient has completed antibiotic course for bacteremia. Had viral studies during recent hospital stay that were negative.   Hepatotropic viruses were checked as well were negative  -previously ordered blood work was not completed, obtain repeat liver function test for further assessment  -Continue complete alcohol abstinence  3- Chronic liver disease staging  No clinical or lab data suggestive of advanced liver disease   - previously ordered fibroscan was not completed, obtain fibroscan for fibrosis staging and assessment  4-Associated medical conditions include but not limited to Enterococcus bacteremia, hypertension, URIEL, history of substance abuse, chronic pain, erectile dysfunction history of significant alcohol use, recent UTI/sepsis with Enterococcus bacteremia. 5. [facility]  Had colonoscopy at [facility] and was recommended repeat endoscopy in 2023. Record of previous colonoscopy from [facility] not available at this time       Return in about 8 weeks (around 7/19/2022), or if symptoms worsen or fail to improve.       Aaron Kang, APRN - CNP

## 2022-06-16 ENCOUNTER — OFFICE VISIT (OUTPATIENT)
Dept: FAMILY MEDICINE CLINIC | Age: 62
End: 2022-06-16
Payer: MEDICARE

## 2022-06-16 VITALS — WEIGHT: 206 LBS | HEIGHT: 70 IN | TEMPERATURE: 98.8 F | BODY MASS INDEX: 29.49 KG/M2

## 2022-06-16 DIAGNOSIS — Z85.828 H/O BASAL CELL CARCINOMA EXCISION: ICD-10-CM

## 2022-06-16 DIAGNOSIS — B35.4 RINGWORM OF BODY: Primary | ICD-10-CM

## 2022-06-16 DIAGNOSIS — Z98.890 H/O BASAL CELL CARCINOMA EXCISION: ICD-10-CM

## 2022-06-16 PROCEDURE — 99214 OFFICE O/P EST MOD 30 MIN: CPT | Performed by: FAMILY MEDICINE

## 2022-06-16 RX ORDER — PRENATAL VIT 91/IRON/FOLIC/DHA 28-975-200
COMBINATION PACKAGE (EA) ORAL
Qty: 30 G | Refills: 0 | Status: SHIPPED | OUTPATIENT
Start: 2022-06-16

## 2022-06-16 ASSESSMENT — ENCOUNTER SYMPTOMS: COLOR CHANGE: 0

## 2022-06-16 NOTE — PATIENT INSTRUCTIONS
Patient is a new problem of ringworm is being treated with prescription Lamisil. Keep routine skin follow-ups for history of basal cell cancer 6 months apart at this time. May consider going to 1 year if next exam is negative. Patient Education        Ringworm: Care Instructions  Your Care Instructions  Ringworm is a fungus infection of the skin. It is not caused by a worm. Ringworm causes a round, scaly rash that may crack and itch. The rash can spread over a wide area. One type of fungus that causes ringworm is often found in locker rooms and swimming pools. It grows well in warm, moist areas of the skin, such as in skin folds. You can get ringworm by sharing towels, clothing,and sports equipment. You can also get it by touching someone who has ringworm. Ringworm is treated with cream that kills the fungus. If the rash is widespread, you may need pills to get rid of it. Ringworm often comes back after treatment. If the rash becomes infected with bacteria, you may needantibiotics. Follow-up care is a key part of your treatment and safety. Be sure to make and go to all appointments, and call your doctor if you are having problems. It's also a good idea to know your test results and keep alist of the medicines you take. How can you care for yourself at home?  Take your medicines exactly as prescribed. Call your doctor if you have any problems with your medicine.  Wash the rash with soap and water, remove flaky skin, and dry thoroughly.  Try an over-the-counter antifungal cream. Spread the cream beyond the edge or border of the rash. Follow the directions on the package. Do not stop using the medicine just because your skin clears up. You will probably need to continue treatment for 2 to 4 weeks or longer.  To avoid spreading it, wash your hands well after treating or touching the rash.  To keep from getting another infection:  ? Do not go barefoot in public places such as gyms or locker rooms.  Avoid sharing towels and clothes. Use flip-flops or some other type of shoe in the shower. ? Do not wear tight clothes or let your skin stay damp for long periods, such as by staying in a wet bathing suit or sweaty clothes. When should you call for help? Call your doctor now or seek immediate medical care if:     You have signs of infection such as:  ? Pain, warmth, or swelling in your skin. ? Red streaks near a wound in the skin. ? Pus coming from the rash on your skin. ? A fever. Watch closely for changes in your health, and be sure to contact your doctor if:     Your ringworm does not improve after 2 weeks of treatment.      You do not get better as expected. Where can you learn more? Go to [email or web address]://[employer]. org and sign in to your [employer] account. Enter Y374 in the [employer] box to learn more about \"Ringworm: Care Instructions. \"     If you do not have an account, please click on the \"Sign Up Now\" link. Current as of: November 15, 2021               Content Version: 13.2  © 4825-4414 [employer]. Care instructions adapted under license by [facility] ([facility]). If you have questions about a medical condition or this instruction, always ask your healthcare professional. [address] any warranty or liability for your use of this information.

## 2022-06-16 NOTE — PROGRESS NOTES
Diagnosis Orders   1. Ringworm of body  terbinafine (LAMISIL) 1 % cream   2. H/O basal cell carcinoma excision       Return in about 6 months (around 12/16/2022) for 15 min skin check. Patient Instructions     Patient is a new problem of ringworm is being treated with prescription Lamisil. Keep routine skin follow-ups for history of basal cell cancer 6 months apart at this time. May consider going to 1 year if next exam is negative. Patient Education        Ringworm: Care Instructions  Your Care Instructions  Ringworm is a fungus infection of the skin. It is not caused by a worm. Ringworm causes a round, scaly rash that may crack and itch. The rash can spread over a wide area. One type of fungus that causes ringworm is often found in locker rooms and swimming pools. It grows well in warm, moist areas of the skin, such as in skin folds. You can get ringworm by sharing towels, clothing,and sports equipment. You can also get it by touching someone who has ringworm. Ringworm is treated with cream that kills the fungus. If the rash is widespread, you may need pills to get rid of it. Ringworm often comes back after treatment. If the rash becomes infected with bacteria, you may needantibiotics. Follow-up care is a key part of your treatment and safety. Be sure to make and go to all appointments, and call your doctor if you are having problems. It's also a good idea to know your test results and keep alist of the medicines you take. How can you care for yourself at home?  Take your medicines exactly as prescribed. Call your doctor if you have any problems with your medicine.  Wash the rash with soap and water, remove flaky skin, and dry thoroughly.  Try an over-the-counter antifungal cream. Spread the cream beyond the edge or border of the rash. Follow the directions on the package. Do not stop using the medicine just because your skin clears up.  You will probably need to continue treatment for 2 to 4 weeks or longer.  To avoid spreading it, wash your hands well after treating or touching the rash.  To keep from getting another infection:  ? Do not go barefoot in public places such as gyms or locker rooms. Avoid sharing towels and clothes. Use flip-flops or some other type of shoe in the shower. ? Do not wear tight clothes or let your skin stay damp for long periods, such as by staying in a wet bathing suit or sweaty clothes. When should you call for help? Call your doctor now or seek immediate medical care if:     You have signs of infection such as:  ? Pain, warmth, or swelling in your skin. ? Red streaks near a wound in the skin. ? Pus coming from the rash on your skin. ? A fever. Watch closely for changes in your health, and be sure to contact your doctor if:     Your ringworm does not improve after 2 weeks of treatment.      You do not get better as expected. Where can you learn more? Go to [email or web address] and sign in to your [employer] account. Enter W653 in the [employer] box to learn more about \"Ringworm: Care Instructions. \"     If you do not have an account, please click on the \"Sign Up Now\" link. Current as of: November 15, 2021               Content Version: 13.2  © 4055-1968 Healthwise, Incorporated. Care instructions adapted under license by [facility] [place] ([facility]). If you have questions about a medical condition or this instruction, always ask your healthcare professional. [place] [address] any warranty or liability for your use of this information. Subjective:      Patient ID: Lincoln Fitch is a 63 y.o. male who presents for:  Chief Complaint   Patient presents with    Skin Exam    Skin Problem     right inner thigh - thinks its a burn from dropping cheese on it but not 100% sure        Patient is here for history of basal cell cancer on routine skin checking. Denies any new lesions.   Has a few prior old lesions that are unchanged that he would like to have looked at. Current Outpatient Medications on File Prior to Visit   Medication Sig Dispense Refill    omeprazole (PRILOSEC) 40 MG delayed release capsule Take 1 capsule by mouth every morning (before breakfast) 90 capsule 1    oxybutynin (DITROPAN-XL) 10 MG extended release tablet Take 1 tablet by mouth every evening 90 tablet 3    tamsulosin (FLOMAX) 0.4 MG capsule Take 1 capsule by mouth daily 90 capsule 3    amLODIPine (NORVASC) 10 MG tablet take 1 tablet by mouth once daily 90 tablet 3    metoprolol tartrate (LOPRESSOR) 25 MG tablet take 1 tablet by mouth twice a day 180 tablet 3     No current facility-administered medications on file prior to visit. Past Medical History:   Diagnosis Date    Abnormal finding on EKG 2017    Chronic back pain     Erectile dysfunction     HTN (hypertension) 2017    Sleep apnea     Substance abuse ([facility] [address]     Urinary incontinence      Past Surgical History:   Procedure Laterality Date    CYSTOSCOPY  2017    [address] MD  BILATERAL RETROGRADE PYELOGRAMS BLADDER BX FULGURATION    FRACTURE SURGERY      LEG SURGERY Bilateral     screws and plates in both after broken      Social History     Socioeconomic History    Marital status: Single     Spouse name: Not on file    Number of children: Not on file    Years of education: Not on file    Highest education level: Not on file   Occupational History    Not on file   Tobacco Use    Smoking status: Former Smoker     Packs/day: 1.00     Years: 20.00     Pack years: 20.00     Types: Cigarettes     Quit date: 12/15/2021     Years since quittin.5    Smokeless tobacco: Never Used   Substance and Sexual Activity    Alcohol use:  Yes     Alcohol/week: 5.0 standard drinks     Types: 5 Shots of liquor per week     Comment: socially    Drug use: Yes     Frequency: 1.0 times per week     Types: Marijuana Margaritabakari Floyd)     Comment: daily    Sexual activity: Not on file Other Topics Concern    Not on file   Social History Narrative    Not on file     Social Determinants of Health     Financial Resource Strain: Low Risk     Difficulty of Paying Living Expenses: Not very hard   Food Insecurity: No Food Insecurity    Worried About Running Out of Food in the Last Year: Never true    Ching of Food in the Last Year: Never true   Transportation Needs:     Lack of Transportation (Medical): Not on file    Lack of Transportation (Non-Medical): Not on file   Physical Activity:     Days of Exercise per Week: Not on file    Minutes of Exercise per Session: Not on file   Stress:     Feeling of Stress : Not on file   Social Connections:     Frequency of Communication with Friends and Family: Not on file    Frequency of Social Gatherings with Friends and Family: Not on file    Attends Bahai Services: Not on file    Active Member of [address] [place] [address] or Organizations: Not on file    Attends Club or Organization Meetings: Not on file    Marital Status: Not on file   Intimate Partner Violence:     Fear of Current or Ex-Partner: Not on file    Emotionally Abused: Not on file    Physically Abused: Not on file    Sexually Abused: Not on file   Housing Stability:     Unable to Pay for Housing in the Last Year: Not on file    Number of [place] in the Last Year: Not on file    Unstable Housing in the Last Year: Not on file     History reviewed. No pertinent family history. Allergies:  Statins and Proscar [finasteride]    Review of Systems   Constitutional: Negative for chills and fever. Skin: Negative for color change and rash. Allergic/Immunologic: Negative for environmental allergies, food allergies and immunocompromised state. Hematological: Negative for adenopathy. Does not bruise/bleed easily. Psychiatric/Behavioral: Negative for behavioral problems and sleep disturbance.        Objective:   Temp 98.8 °F (37.1 °C)   Ht 5' 10\" (1.778 m)   Wt 206 lb (93.4 kg)   BMI 29.56 kg/m²     Physical Exam  Constitutional:       General: He is not in acute distress. Appearance: Normal appearance. He is well-developed. He is not toxic-appearing. HENT:      Head: Normocephalic and atraumatic. Right Ear: Hearing and tympanic membrane normal.      Left Ear: Hearing and tympanic membrane normal.      Nose: Nose normal. No nasal deformity. Eyes:      General: Lids are normal.         Right eye: No discharge. Left eye: No discharge. Conjunctiva/sclera: Conjunctivae normal.      Pupils: Pupils are equal, round, and reactive to light. Neck:      Thyroid: No thyroid mass or thyromegaly. Vascular: No JVD. Trachea: Trachea and phonation normal.   Cardiovascular:      Rate and Rhythm: Normal rate and regular rhythm. Pulmonary:      Effort: No accessory muscle usage or respiratory distress. Musculoskeletal:      Cervical back: Full passive range of motion without pain. Comments: FROM all large muscle groups and joints as witnessed when walking to exam table, getting on, and getting off the exam table. Skin:     General: Skin is warm and dry. Findings: No rash. Comments: No precancerous or cancerous appearing lesions noted. Center of the upper abdomen patient has a ring of erythematous leading edge tissue with central clearing noted. Neurological:      Mental Status: He is alert. Motor: No tremor or atrophy. Gait: Gait normal.   Psychiatric:         Speech: Speech normal.         Behavior: Behavior normal.         Thought Content: Thought content normal.         No results found for this visit on 06/16/22.     Recent Results (from the past 2016 hour(s))   Culture, Blood 1    Collection Time: 03/24/22  7:14 PM    Specimen: Blood   Result Value Ref Range    Blood Culture, Routine (A)      Gram positive cocci in clusters-resembling Staph  2 out of 2 blood cultures  Gram stain anaerobic bottle  Further testing performed at [facility] Detected    Candida auris by PCR Not Detected Not Detected    Candida glabrata by PCR Not Detected Not Detected    Candida krusei by PCR Not Detected Not Detected    Candida parapsilosis by PCR Not Detected Not Detected    Candida tropicalis by PCR Not Detected Not Detected    Cryptococcus neoformans/gattii by PCR Not Detected Not Detected   Culture, Blood ID Sensitivity    Collection Time: 03/24/22  7:14 PM    Specimen: Blood   Result Value Ref Range    Culture, Blood Id Sensitivity (A)      Cult,Blood:  POSITIVE Blood Culture  Performed at [address]  [phone]      Organism Streptococcus viridans group (A)        Susceptibility    Streptococcus viridans group - BACTERIAL SUSCEPTIBILITY PANEL BY E-TEST      benzylpenicillin 0.016 Sensitive mcg/mL   Culture, Blood ID Sensitivity    Collection Time: 03/24/22  7:14 PM    Specimen: Blood   Result Value Ref Range    Culture, Blood Id Sensitivity (A)      Cult,Blood:  POSITIVE Blood Culture  Performed at [address] [facility], [address] [place] [address]  [phone]      Organism Streptococcus viridans group (A)        Susceptibility    Streptococcus viridans group - BACTERIAL SUSCEPTIBILITY PANEL BY E-TEST      benzylpenicillin 0.016 Sensitive mcg/mL   Comprehensive Metabolic Panel    Collection Time: 03/24/22  7:15 PM   Result Value Ref Range    Sodium 142 135 - 144 mEq/L    Potassium 3.9 3.4 - 4.9 mEq/L    Chloride 105 95 - 107 mEq/L    CO2 17 (L) 20 - 31 mEq/L    Anion Gap 20 (H) 9 - 15 mEq/L    Glucose 113 (H) 70 - 99 mg/dL    BUN 12 8 - 23 mg/dL    CREATININE 0.92 0.70 - 1.20 mg/dL    GFR Non-African American >60.0 >60    GFR  >60.0 >60    Calcium 8.7 8.5 - 9.9 mg/dL    Total Protein 6.8 6.3 - 8.0 g/dL    Albumin 4.2 3.5 - 4.6 g/dL    Total Bilirubin 0.3 0.2 - 0.7 mg/dL    Alkaline Phosphatase 92 35 - 104 U/L    ALT 25 0 - 41 U/L    AST 22 0 - 40 U/L    Globulin 2.6 2.3 - 3.5 g/dL   CBC with Auto Differential    Collection Time: 03/24/22  7:15 PM   Result Value Ref Range    WBC 5.7 4.8 - 10.8 K/uL    RBC 5.01 4.70 - 6.10 M/uL    Hemoglobin 15.7 14.0 - 18.0 g/dL    Hematocrit 47.1 42.0 - 52.0 %    MCV 93.9 80.0 - 100.0 fL    MCH 31.2 27.0 - 31.3 pg    MCHC 33.3 33.0 - 37.0 %    RDW 14.2 11.5 - 14.5 %    Platelets 450 031 - 875 K/uL    Neutrophils % 89.3 %    Lymphocytes % 9.4 %    Monocytes % 0.8 %    Eosinophils % 0.3 %    Basophils % 0.2 %    Neutrophils Absolute 5.1 1.4 - 6.5 K/uL    Lymphocytes Absolute 0.5 (L) 1.0 - 4.8 K/uL    Monocytes Absolute 0.0 (L) 0.2 - 0.8 K/uL    Eosinophils Absolute 0.0 0.0 - 0.7 K/uL    Basophils Absolute 0.0 0.0 - 0.2 K/uL   Urinalysis with Reflex to Culture    Collection Time: 03/24/22  7:15 PM    Specimen: Urine   Result Value Ref Range    Color, UA Yellow Straw/Yellow    Clarity, UA Clear Clear    Glucose, Ur Negative Negative mg/dL    Bilirubin Urine Negative Negative    Ketones, Urine Negative Negative mg/dL    Specific Gravity, UA 1.031 1.005 - 1.030    Blood, Urine MODERATE (A) Negative    pH, UA 5.5 5.0 - 9.0    Protein, UA Negative Negative mg/dL    Urobilinogen, Urine 0.2 <2.0 E.U./dL    Nitrite, Urine Negative Negative    Leukocyte Esterase, Urine MODERATE (A) Negative    Urine Reflex to Culture Yes    Lactic Acid    Collection Time: 03/24/22  7:15 PM   Result Value Ref Range    Lactic Acid 6.1 (HH) 0.5 - 2.2 mmol/L   Microscopic Urinalysis    Collection Time: 03/24/22  7:15 PM   Result Value Ref Range    Bacteria, UA FEW (A) Negative /HPF    Hyaline Casts, UA 0-1 0 - 5 /HPF    WBC, UA 20-50 (A) 0 - 5 /HPF    RBC, UA  (A) 0 - 5 /HPF    Epithelial Cells, UA 0-2 0 - 5 /HPF   EKG 12 Lead    Collection Time: 03/24/22  9:19 PM   Result Value Ref Range    Ventricular Rate 121 BPM    Atrial Rate 121 BPM    P-R Interval 122 ms    QRS Duration 86 ms    Q-T Interval 332 ms    QTc Calculation (Bazett) 471 ms    P Axis 65 degrees    R Axis -8 degrees    T Axis 61 degrees Culture, Urine    Collection Time: 03/24/22 10:07 PM    Specimen: Urine, clean catch   Result Value Ref Range    Urine Culture, Routine (A)      Performed at [address] [place] [address]  [phone]      Organism Enterococcus faecalis (A)     Urine Culture, Routine >077316 CFU/ML        Susceptibility    Enterococcus faecalis - BACTERIAL SUSCEPTIBILITY PANEL BY CADY     ampicillin <=2 Sensitive mcg/mL     nitrofurantoin <=16 Sensitive mcg/mL     tetracycline <=1 Sensitive mcg/mL     vancomycin 2 Sensitive mcg/mL   Lactic Acid    Collection Time: 03/24/22 10:45 PM   Result Value Ref Range    Lactic Acid 4.2 (HH) 0.5 - 2.2 mmol/L   Ethanol    Collection Time: 03/25/22  6:03 AM   Result Value Ref Range    Ethanol Lvl <10 mg/dL    Ethanol percent Not indicated G/dL   Comprehensive Metabolic Panel w/ Reflex to MG    Collection Time: 03/25/22  6:03 AM   Result Value Ref Range    Sodium 137 135 - 144 mEq/L    Potassium reflex Magnesium 4.1 3.4 - 4.9 mEq/L    Chloride 105 95 - 107 mEq/L    CO2 18 (L) 20 - 31 mEq/L    Anion Gap 14 9 - 15 mEq/L    Glucose 107 (H) 70 - 99 mg/dL    BUN 8 8 - 23 mg/dL    CREATININE 0.68 (L) 0.70 - 1.20 mg/dL    GFR Non-African American >60.0 >60    GFR  >60.0 >60    Calcium 8.1 (L) 8.5 - 9.9 mg/dL    Total Protein 6.0 (L) 6.3 - 8.0 g/dL    Albumin 3.5 3.5 - 4.6 g/dL    Total Bilirubin 0.4 0.2 - 0.7 mg/dL    Alkaline Phosphatase 58 35 - 104 U/L    ALT 27 0 - 41 U/L    AST 42 (H) 0 - 40 U/L    Globulin 2.5 2.3 - 3.5 g/dL   CBC auto differential    Collection Time: 03/25/22  6:03 AM   Result Value Ref Range    WBC 13.4 (H) 4.8 - 10.8 K/uL    RBC 4.17 (L) 4.70 - 6.10 M/uL    Hemoglobin 12.9 (L) 14.0 - 18.0 g/dL    Hematocrit 38.5 (L) 42.0 - 52.0 %    MCV 92.3 80.0 - 100.0 fL    MCH 30.9 27.0 - 31.3 pg    MCHC 33.5 33.0 - 37.0 %    RDW 14.4 11.5 - 14.5 %    Platelets 625 638 - 326 K/uL    PLATELET SLIDE REVIEW Normal     Neutrophils % 86.0 %    Lymphocytes % 5.0 %    Monocytes % 3.8 %    Eosinophils % 0.1 %    Basophils % 0.2 %    Neutrophils Absolute 12.3 (H) 1.4 - 6.5 K/uL    Lymphocytes Absolute 0.7 (L) 1.0 - 4.8 K/uL    Monocytes Absolute 0.5 0.2 - 0.8 K/uL    Eosinophils Absolute 0.0 0.0 - 0.7 K/uL    Basophils Absolute 0.0 0.0 - 0.2 K/uL    Bands Relative 6 %    RBC Morphology Normal    Lactic Acid    Collection Time: 03/25/22  6:03 AM   Result Value Ref Range    Lactic Acid 1.0 0.5 - 2.2 mmol/L   Comprehensive Metabolic Panel w/ Reflex to MG    Collection Time: 03/26/22  5:43 AM   Result Value Ref Range    Sodium 138 135 - 144 mEq/L    Potassium reflex Magnesium 4.1 3.4 - 4.9 mEq/L    Chloride 108 (H) 95 - 107 mEq/L    CO2 16 (L) 20 - 31 mEq/L    Anion Gap 14 9 - 15 mEq/L    Glucose 105 (H) 70 - 99 mg/dL    BUN 10 8 - 23 mg/dL    CREATININE 0.73 0.70 - 1.20 mg/dL    GFR Non-African American >60.0 >60    GFR  >60.0 >60    Calcium 8.1 (L) 8.5 - 9.9 mg/dL    Total Protein 5.6 (L) 6.3 - 8.0 g/dL    Albumin 2.9 (L) 3.5 - 4.6 g/dL    Total Bilirubin 0.3 0.2 - 0.7 mg/dL    Alkaline Phosphatase 67 35 - 104 U/L    ALT 42 (H) 0 - 41 U/L    AST 67 (H) 0 - 40 U/L    Globulin 2.7 2.3 - 3.5 g/dL   Comprehensive Metabolic Panel w/ Reflex to MG    Collection Time: 03/27/22  5:17 AM   Result Value Ref Range    Sodium 137 135 - 144 mEq/L    Potassium reflex Magnesium 3.7 3.4 - 4.9 mEq/L    Chloride 105 95 - 107 mEq/L    CO2 20 20 - 31 mEq/L    Anion Gap 12 9 - 15 mEq/L    Glucose 100 (H) 70 - 99 mg/dL    BUN 8 8 - 23 mg/dL    CREATININE 0.65 (L) 0.70 - 1.20 mg/dL    GFR Non-African American >60.0 >60    GFR  >60.0 >60    Calcium 7.7 (L) 8.5 - 9.9 mg/dL    Total Protein 5.5 (L) 6.3 - 8.0 g/dL    Albumin 2.9 (L) 3.5 - 4.6 g/dL    Total Bilirubin 0.3 0.2 - 0.7 mg/dL    Alkaline Phosphatase 104 35 - 104 U/L    ALT 69 (H) 0 - 41 U/L    AST 77 (H) 0 - 40 U/L    Globulin 2.6 2.3 - 3.5 g/dL   Culture, Blood 2    Collection Time: 03/27/22  8:03 AM Specimen: Blood   Result Value Ref Range    Culture, Blood 2 No growth after 5 days of incubation. Culture, Blood 1    Collection Time: 03/27/22  8:03 AM    Specimen: Blood   Result Value Ref Range    Blood Culture, Routine No growth after 5 days of incubation.     POCT Glucose    Collection Time: 03/27/22  4:47 PM   Result Value Ref Range    POC Glucose 101 (H) 70 - 99 mg/dl    Performed on ACCU-CHEK    Comprehensive Metabolic Panel w/ Reflex to MG    Collection Time: 03/28/22  4:52 AM   Result Value Ref Range    Sodium 136 135 - 144 mEq/L    Potassium reflex Magnesium 3.8 3.4 - 4.9 mEq/L    Chloride 104 95 - 107 mEq/L    CO2 18 (L) 20 - 31 mEq/L    Anion Gap 14 9 - 15 mEq/L    Glucose 139 (H) 70 - 99 mg/dL    BUN 7 (L) 8 - 23 mg/dL    CREATININE 0.64 (L) 0.70 - 1.20 mg/dL    GFR Non-African American >60.0 >60    GFR  >60.0 >60    Calcium 8.2 (L) 8.5 - 9.9 mg/dL    Total Protein 6.2 (L) 6.3 - 8.0 g/dL    Albumin 3.4 (L) 3.5 - 4.6 g/dL    Total Bilirubin 0.4 0.2 - 0.7 mg/dL    Alkaline Phosphatase 139 (H) 35 - 104 U/L    ALT 94 (H) 0 - 41 U/L    AST 84 (H) 0 - 40 U/L    Globulin 2.8 2.3 - 3.5 g/dL   Hepatitis Panel, Acute    Collection Time: 03/28/22  9:12 AM   Result Value Ref Range    Hepatitis B Surface Ag NONREACTIVE NR    Hepatitis C Ab NONREACTIVE NR    Hep B Core Ab, IgM NONREACTIVE NR    Hep A IgM NONREACTIVE NR   Protime-INR    Collection Time: 03/28/22 11:14 AM   Result Value Ref Range    Protime 12.6 12.3 - 14.9 sec    INR 0.9    Comprehensive Metabolic Panel w/ Reflex to MG    Collection Time: 03/29/22  6:36 AM   Result Value Ref Range    Sodium 141 135 - 144 mEq/L    Potassium reflex Magnesium 4.0 3.4 - 4.9 mEq/L    Chloride 108 (H) 95 - 107 mEq/L    CO2 20 20 - 31 mEq/L    Anion Gap 13 9 - 15 mEq/L    Glucose 101 (H) 70 - 99 mg/dL    BUN 9 8 - 23 mg/dL    CREATININE 0.66 (L) 0.70 - 1.20 mg/dL    GFR Non-African American >60.0 >60    GFR  >60.0 >60    Calcium 8.2 (L) 8.5 - 9.9 mg/dL    Total Protein 5.9 (L) 6.3 - 8.0 g/dL    Albumin 3.3 (L) 3.5 - 4.6 g/dL    Total Bilirubin 0.3 0.2 - 0.7 mg/dL    Alkaline Phosphatase 133 (H) 35 - 104 U/L     (H) 0 - 41 U/L     (H) 0 - 40 U/L    Globulin 2.6 2.3 - 3.5 g/dL   Protime-INR    Collection Time: 03/29/22  6:36 AM   Result Value Ref Range    Protime 12.5 12.3 - 14.9 sec    INR 0.9    CBC    Collection Time: 03/29/22  6:36 AM   Result Value Ref Range    WBC 5.5 4.8 - 10.8 K/uL    RBC 4.09 (L) 4.70 - 6.10 M/uL    Hemoglobin 12.7 (L) 14.0 - 18.0 g/dL    Hematocrit 37.5 (L) 42.0 - 52.0 %    MCV 91.6 80.0 - 100.0 fL    MCH 31.0 27.0 - 31.3 pg    MCHC 33.8 33.0 - 37.0 %    RDW 13.9 11.5 - 14.5 %    Platelets 691 556 - 471 K/uL   KWABENA BARR VIRUS (EBV) ANTIBODY PANEL I    Collection Time: 03/29/22  6:36 AM   Result Value Ref Range    EBV Early Ag Ab 5.6 0.0 - 10.9 U/mL    EBV VCA IgG 636.0 (H) 0.0 - 21.9 U/mL    EBV VCA IgM <10.0 0.0 - 43.9 U/mL    Kwabena Barr virus nuclear Ab IgG 118.0 (H) 0.0 - 21.9 U/mL   CYTOMEGALOVIRUS ANTIBODY, IGG,IGM    Collection Time: 03/29/22  6:36 AM   Result Value Ref Range    CMV IgG <0.20 U/mL    CMV IgM <8.0 <=29.9 AU/mL   HERPES SIMPLEX VIRUS (HSV) I/II ANTIBODIES IGG & IGM    Collection Time: 03/29/22  6:36 AM   Result Value Ref Range    Herpes Type I/II IgG Combined >22.40 IV    Herpes Type 1/2 IgM Combined 0.58 <=0.89 IV   Hepatitis Panel, Acute    Collection Time: 03/29/22  6:36 AM   Result Value Ref Range    Hepatitis B Surface Ag NONREACTIVE NR    Hepatitis C Ab NONREACTIVE NR    Hep B Core Ab, IgM NONREACTIVE NR    Hep A IgM NONREACTIVE NR   Comprehensive Metabolic Panel w/ Reflex to MG    Collection Time: 03/30/22  4:48 AM   Result Value Ref Range    Sodium 141 135 - 144 mEq/L    Potassium reflex Magnesium 4.3 3.4 - 4.9 mEq/L    Chloride 106 95 - 107 mEq/L    CO2 21 20 - 31 mEq/L    Anion Gap 14 9 - 15 mEq/L    Glucose 98 70 - 99 mg/dL    BUN 13 8 - 23 mg/dL    CREATININE 0.77 0.70 - 1.20 mg/dL    GFR Non-African American >60.0 >60    GFR  >60.0 >60    Calcium 8.5 8.5 - 9.9 mg/dL    Total Protein 6.1 (L) 6.3 - 8.0 g/dL    Albumin 3.3 (L) 3.5 - 4.6 g/dL    Total Bilirubin 0.3 0.2 - 0.7 mg/dL    Alkaline Phosphatase 128 (H) 35 - 104 U/L     (H) 0 - 41 U/L    AST 78 (H) 0 - 40 U/L    Globulin 2.8 2.3 - 3.5 g/dL   Procalcitonin    Collection Time: 03/30/22  4:48 AM   Result Value Ref Range    Procalcitonin 1.44 (H) 0.00 - 0.15 ng/mL   Protime-INR    Collection Time: 03/30/22  4:49 AM   Result Value Ref Range    Protime 12.5 12.3 - 14.9 sec    INR 0.9    CBC    Collection Time: 03/30/22  4:49 AM   Result Value Ref Range    WBC 7.5 4.8 - 10.8 K/uL    RBC 4.24 (L) 4.70 - 6.10 M/uL    Hemoglobin 13.1 (L) 14.0 - 18.0 g/dL    Hematocrit 39.5 (L) 42.0 - 52.0 %    MCV 93.0 80.0 - 100.0 fL    MCH 30.8 27.0 - 31.3 pg    MCHC 33.2 33.0 - 37.0 %    RDW 14.2 11.5 - 14.5 %    Platelets 819 172 - 808 K/uL   HIV Screen    Collection Time: 03/30/22  4:49 AM   Result Value Ref Range    HIV Ag/Ab NONREACTIVE NR   Comprehensive Metabolic Panel    Collection Time: 05/24/22  3:27 PM   Result Value Ref Range    Sodium 138 135 - 144 mEq/L    Potassium 4.7 3.4 - 4.9 mEq/L    Chloride 102 95 - 107 mEq/L    CO2 24 20 - 31 mEq/L    Anion Gap 12 9 - 15 mEq/L    Glucose 120 (H) 70 - 99 mg/dL    BUN 11 8 - 23 mg/dL    CREATININE 0.91 0.70 - 1.20 mg/dL    GFR Non-African American >60.0 >60    GFR  >60.0 >60    Calcium 9.1 8.5 - 9.9 mg/dL    Total Protein 6.9 6.3 - 8.0 g/dL    Albumin 4.1 3.5 - 4.6 g/dL    Total Bilirubin <0.2 0.2 - 0.7 mg/dL    Alkaline Phosphatase 87 35 - 104 U/L    ALT 18 0 - 41 U/L    AST 22 0 - 40 U/L    Globulin 2.8 2.3 - 3.5 g/dL       [] Pt was seen by provider for      Minutes  Counseling and coordination of care was done for all assessment diagnosis listed for today with patient and any family/friend present.    More than 50% of this visit was spent coordinating current care, obtaining information for prior records, and counseling for current plan of action. Assessment:       Diagnosis Orders   1. Ringworm of body  terbinafine (LAMISIL) 1 % cream   2. H/O basal cell carcinoma excision           No orders of the defined types were placed in this encounter. Orders Placed This Encounter   Medications    terbinafine (LAMISIL) 1 % cream     Sig: Apply topically 2 times daily. Dispense:  30 g     Refill:  0          Medication List          Accurate as of June 16, 2022  4:37 PM. If you have any questions, ask your nurse or doctor. START taking these medications    terbinafine 1 % cream  Commonly known as: LAMISIL  Apply topically 2 times daily. Started by: Rufina Gilford, MD        CONTINUE taking these medications    amLODIPine 10 MG tablet  Commonly known as: NORVASC  take 1 tablet by mouth once daily     metoprolol tartrate 25 MG tablet  Commonly known as: LOPRESSOR  take 1 tablet by mouth twice a day     omeprazole 40 MG delayed release capsule  Commonly known as: PRILOSEC  Take 1 capsule by mouth every morning (before breakfast)     oxybutynin 10 MG extended release tablet  Commonly known as: DITROPAN-XL  Take 1 tablet by mouth every evening     tamsulosin 0.4 MG capsule  Commonly known as: FLOMAX  Take 1 capsule by mouth daily           Where to Get Your Medications      These medications were sent to [address] [place] [address]  [address], [address] [facility]    Phone: [phone]   · terbinafine 1 % cream           Plan:   Return in about 6 months (around 12/16/2022) for 15 min skin check. Patient Instructions     Patient is a new problem of ringworm is being treated with prescription Lamisil. Keep routine skin follow-ups for history of basal cell cancer 6 months apart at this time. May consider going to 1 year if next exam is negative.   Patient call for help? Call your doctor now or seek immediate medical care if:     You have signs of infection such as:  ? Pain, warmth, or swelling in your skin. ? Red streaks near a wound in the skin. ? Pus coming from the rash on your skin. ? A fever. Watch closely for changes in your health, and be sure to contact your doctor if:     Your ringworm does not improve after 2 weeks of treatment.      You do not get better as expected. Where can you learn more? Go to [email or web address] and sign in to your [employer] account. Enter Q382 in the [employer] box to learn more about \"Ringworm: Care Instructions. \"     If you do not have an account, please click on the \"Sign Up Now\" link. Current as of: November 15, 2021               Content Version: 13.2  © 2306-1017 Healthwise, [employer]. Care instructions adapted under license by [facility]– [place][note 2] [address] If you have questions about a medical condition or this instruction, always ask your healthcare professional. [place] [address] any warranty or liability for your use of this information. This note was partially created with the assistance of dictation. This may lead to grammatical or spelling errors. Villa Menezes M.D.

## 2022-07-23 ENCOUNTER — OFFICE VISIT (OUTPATIENT)
Dept: FAMILY MEDICINE CLINIC | Age: 62
End: 2022-07-23
Payer: MEDICARE

## 2022-07-23 VITALS
TEMPERATURE: 97.9 F | HEIGHT: 70 IN | HEART RATE: 91 BPM | WEIGHT: 206 LBS | DIASTOLIC BLOOD PRESSURE: 76 MMHG | SYSTOLIC BLOOD PRESSURE: 128 MMHG | OXYGEN SATURATION: 96 % | BODY MASS INDEX: 29.49 KG/M2

## 2022-07-23 DIAGNOSIS — R35.0 URINARY FREQUENCY: ICD-10-CM

## 2022-07-23 DIAGNOSIS — N30.01 ACUTE CYSTITIS WITH HEMATURIA: Primary | ICD-10-CM

## 2022-07-23 LAB
BILIRUBIN, POC: ABNORMAL
BLOOD URINE, POC: ABNORMAL
CLARITY, POC: CLEAR
COLOR, POC: YELLOW
GLUCOSE URINE, POC: ABNORMAL
KETONES, POC: ABNORMAL
LEUKOCYTE EST, POC: ABNORMAL
NITRITE, POC: ABNORMAL
PH, POC: 7
PROTEIN, POC: ABNORMAL
SPECIFIC GRAVITY, POC: 1.01
UROBILINOGEN, POC: 0.2

## 2022-07-23 PROCEDURE — 99213 OFFICE O/P EST LOW 20 MIN: CPT | Performed by: NURSE PRACTITIONER

## 2022-07-23 PROCEDURE — 81003 URINALYSIS AUTO W/O SCOPE: CPT | Performed by: NURSE PRACTITIONER

## 2022-07-23 RX ORDER — AMOXICILLIN AND CLAVULANATE POTASSIUM 875; 125 MG/1; MG/1
1 TABLET, FILM COATED ORAL 2 TIMES DAILY
Qty: 14 TABLET | Refills: 0 | Status: SHIPPED | OUTPATIENT
Start: 2022-07-23 | End: 2022-07-30

## 2022-07-23 SDOH — ECONOMIC STABILITY: FOOD INSECURITY: WITHIN THE PAST 12 MONTHS, THE FOOD YOU BOUGHT JUST DIDN'T LAST AND YOU DIDN'T HAVE MONEY TO GET MORE.: NEVER TRUE

## 2022-07-23 SDOH — ECONOMIC STABILITY: FOOD INSECURITY: WITHIN THE PAST 12 MONTHS, YOU WORRIED THAT YOUR FOOD WOULD RUN OUT BEFORE YOU GOT MONEY TO BUY MORE.: NEVER TRUE

## 2022-07-23 ASSESSMENT — ENCOUNTER SYMPTOMS
DIARRHEA: 0
NAUSEA: 0
ABDOMINAL PAIN: 0
ABDOMINAL DISTENTION: 0

## 2022-07-23 ASSESSMENT — SOCIAL DETERMINANTS OF HEALTH (SDOH): HOW HARD IS IT FOR YOU TO PAY FOR THE VERY BASICS LIKE FOOD, HOUSING, MEDICAL CARE, AND HEATING?: NOT HARD AT ALL

## 2022-07-23 NOTE — PROGRESS NOTES
Subjective  Julieta Shearer, 63 y.o. male presents today with:  Chief Complaint   Patient presents with    Other     Burning, frequency when urinating. HPI  Presents to [facility] for urinary concerns   Symptoms started 3-4 days ago   Dysuria & urgency/frequency of urination   Awakening at night to urinate   Denies incontinence   Denies hematuria   Denies flank pain   Denies abdominal pain   Denies chills   Denies diarrhea   Fatigued   Lessened appetite   Hydrating well   UTI 3/2022 hospitalized                     Past Medical History:   Diagnosis Date    Abnormal finding on EKG 7/11/2017    Chronic back pain     Erectile dysfunction     HTN (hypertension) 1/13/2017    Sleep apnea     Substance abuse ([facility] [address]     Urinary incontinence       Past Surgical History:   Procedure Laterality Date    CYSTOSCOPY  06/07/2017    [facility] SKIM MD  BILATERAL RETROGRADE PYELOGRAMS BLADDER BX FULGURATION    FRACTURE SURGERY      LEG SURGERY Bilateral     screws and plates in both after broken      No family history on file. Review of Systems   Constitutional:  Positive for appetite change and fatigue. Negative for activity change, chills and diaphoresis. Fever: unsure. not taking temp. Gastrointestinal:  Negative for abdominal distention, abdominal pain, diarrhea and nausea. Genitourinary:  Positive for dysuria, frequency and urgency. Negative for difficulty urinating, flank pain and hematuria. Neurological:  Negative for dizziness and light-headedness. Psychiatric/Behavioral:  Positive for sleep disturbance. PMH, Surgical Hx, Family Hx, and Social Hx reviewed and updated.               Objective  Vitals:    07/23/22 1144   BP: 128/76   Pulse: 91   Temp: 97.9 °F (36.6 °C)   SpO2: 96%   Weight: 206 lb (93.4 kg)   Height: 5' 10\" (1.778 m)     BP Readings from Last 3 Encounters:   07/23/22 128/76   05/24/22 116/74   05/12/22 136/82     Wt Readings from Last 3 Encounters:   07/23/22 206 lb (93.4 kg)   06/16/22 206 lb (93.4 kg)   05/24/22 206 lb (93.4 kg)           Physical Exam  Vitals reviewed. Constitutional:       General: He is not in acute distress. Appearance: He is not toxic-appearing. HENT:      Right Ear: External ear normal.      Left Ear: External ear normal.   Eyes:      General: Lids are normal.      Conjunctiva/sclera: Conjunctivae normal.   Cardiovascular:      Rate and Rhythm: Normal rate. Pulmonary:      Effort: Pulmonary effort is normal.   Abdominal:      General: There is no distension. Tenderness: There is no abdominal tenderness. There is no right CVA tenderness, left CVA tenderness or guarding. Skin:     General: Skin is warm and dry. Coloration: Skin is not pale. Neurological:      Mental Status: He is alert and oriented to person, place, and time. Assessment & Plan    Diagnosis Orders   1. Acute cystitis with hematuria  amoxicillin-clavulanate (AUGMENTIN) 875-125 MG per tablet      2. Urinary frequency  Culture, Urine    POCT Urinalysis No Micro (Auto)        Orders Placed This Encounter   Procedures    Culture, Urine     Standing Status:   Future     Standing Expiration Date:   7/23/2023     Order Specific Question:   Specify (ex-cath, midstream, cysto, etc)? Answer:   midstream    POCT Urinalysis No Micro (Auto)     Orders Placed This Encounter   Medications    amoxicillin-clavulanate (AUGMENTIN) 875-125 MG per tablet     Sig: Take 1 tablet by mouth in the morning and 1 tablet before bedtime. Do all this for 7 days. Dispense:  14 tablet     Refill:  0         Return if symptoms worsen or fail to improve, for follow up with PCP. Reviewed with the patient: current clinical status & medications. Side effects, adverse effects of the medication prescribed today, as well as treatment plan/rationale and result expectations have been discussed with the patient who expressed understanding. How can you care for yourself at home?   Take your antibiotics as prescribed. Do not stop taking them just because you feel better. You need to take the full course of antibiotics. Take your medicines exactly as prescribed. Your doctor may have prescribed a medicine, such as phenazopyridine (Pyridium), to help relieve pain when you urinate. This turns your urine orange. You may stop taking it when your symptoms get better. But be sure to take all of your antibiotics, which treat the infection. Drink extra water for the next day or two. This will help make the urine less concentrated and help wash out the bacteria causing the infection. (If you have kidney, heart, or liver disease and have to limit your fluids, talk with your doctor before you increase your fluid intake.)  Avoid drinks that are carbonated or have caffeine. They can irritate the bladder. Urinate often. Try to empty your bladder each time. To relieve pain, take a hot bath or lay a heating pad (set on low) over your lower belly or genital area. Never go to sleep with a heating pad in place. To help prevent UTIs  Drink plenty of fluids. If you have kidney, heart, or liver disease and have to limit fluids, talk with your doctor before you increase the amount of fluids you drink. Urinate when you have the urge. Do not hold your urine for a long time. Urinate before you go to sleep. When should you call for help? Call your doctor now or seek immediate medical care if:    Symptoms such as a fever, chills, nausea, or vomiting get worse or happen for the first time. You have new or worse pain in your back just below your rib cage. This is called flank pain. There is new blood or pus in your urine. You are not able to take or keep down your antibiotics. Watch closely for changes in your health, and be sure to contact your doctor if:    You are not getting better after taking an antibiotic for 3 days. Your symptoms go away but then come back.                  Close follow up to evaluate treatment results and for coordination of care. I have reviewed the patient's medical history in detail and updated the computerized patient record.         SHREYA Null NP

## 2022-07-25 LAB — URINE CULTURE, ROUTINE: NORMAL

## 2022-11-08 ENCOUNTER — OFFICE VISIT (OUTPATIENT)
Dept: FAMILY MEDICINE CLINIC | Age: 62
End: 2022-11-08
Payer: MEDICARE

## 2022-11-08 VITALS
WEIGHT: 206 LBS | HEART RATE: 78 BPM | OXYGEN SATURATION: 95 % | TEMPERATURE: 97.4 F | DIASTOLIC BLOOD PRESSURE: 80 MMHG | BODY MASS INDEX: 29.56 KG/M2 | SYSTOLIC BLOOD PRESSURE: 130 MMHG

## 2022-11-08 DIAGNOSIS — L02.91 ABSCESS: Primary | ICD-10-CM

## 2022-11-08 PROCEDURE — 3078F DIAST BP <80 MM HG: CPT | Performed by: NURSE PRACTITIONER

## 2022-11-08 PROCEDURE — 99213 OFFICE O/P EST LOW 20 MIN: CPT | Performed by: NURSE PRACTITIONER

## 2022-11-08 PROCEDURE — 3074F SYST BP LT 130 MM HG: CPT | Performed by: NURSE PRACTITIONER

## 2022-11-08 RX ORDER — AMOXICILLIN AND CLAVULANATE POTASSIUM 875; 125 MG/1; MG/1
1 TABLET, FILM COATED ORAL 2 TIMES DAILY
Qty: 14 TABLET | Refills: 0 | Status: SHIPPED | OUTPATIENT
Start: 2022-11-08 | End: 2022-11-15

## 2022-11-08 NOTE — PROGRESS NOTES
Subjective  Queen Artur, 63 y.o. male presents today with:  Chief Complaint   Patient presents with    Other     Rt leg bump  painful        HPI  Presents to [facility] for a skin concern   Noted area yesterday   Affected area right posterior thigh   Area is tender to touch   Erythema to site   Denies drainage to site   Unsure cause of affected site   Denies fever or chills   Denies N/V/D  Sleep unchanged                       Past Medical History:   Diagnosis Date    Abnormal finding on EKG 7/11/2017    Chronic back pain     Erectile dysfunction     HTN (hypertension) 1/13/2017    Sleep apnea     Substance abuse ([address]     Urinary incontinence       Past Surgical History:   Procedure Laterality Date    CYSTOSCOPY  06/07/2017    [facility] SKIM MD  BILATERAL RETROGRADE PYELOGRAMS BLADDER BX FULGURATION    FRACTURE SURGERY      LEG SURGERY Bilateral     screws and plates in both after broken      No family history on file. Review of Systems   Constitutional:  Negative for activity change, appetite change, chills, diaphoresis, fatigue and fever. Gastrointestinal:  Negative for diarrhea and nausea. Musculoskeletal:  Negative for neck stiffness. Skin:  Positive for color change. Rash: abscess. Neurological:  Negative for dizziness and light-headedness. PMH, Surgical Hx, Family Hx, and Social Hx reviewed and updated. Objective  Vitals:    11/08/22 1247   BP: 130/80   Pulse: 78   Temp: 97.4 °F (36.3 °C)   SpO2: 95%   Weight: 206 lb (93.4 kg)     BP Readings from Last 3 Encounters:   11/08/22 130/80   07/23/22 128/76   05/24/22 116/74     Wt Readings from Last 3 Encounters:   11/08/22 206 lb (93.4 kg)   07/23/22 206 lb (93.4 kg)   06/16/22 206 lb (93.4 kg)           Physical Exam  Vitals reviewed. Constitutional:       General: He is not in acute distress. Appearance: He is not ill-appearing or toxic-appearing.    HENT:      Right Ear: External ear normal.      Left Ear: External ear normal. Nose: Nose normal.      Mouth/Throat:      Lips: Pink. Eyes:      General: Lids are normal.      Conjunctiva/sclera: Conjunctivae normal.   Cardiovascular:      Rate and Rhythm: Normal rate. Pulmonary:      Effort: Pulmonary effort is normal.   Musculoskeletal:      Cervical back: Normal range of motion. No rigidity. Comments: Uses cane for ambulation   Skin:     General: Skin is warm and dry. Findings: Abscess present. Comments: Abscess to site as marked. Warmth, erythema and induration present. No active drainage. Tender to palpation. Neurological:      Mental Status: He is alert and oriented to person, place, and time. Assessment & Plan    Diagnosis Orders   1. Abscess  amoxicillin-clavulanate (AUGMENTIN) 875-125 MG per tablet        No orders of the defined types were placed in this encounter. Orders Placed This Encounter   Medications    amoxicillin-clavulanate (AUGMENTIN) 875-125 MG per tablet     Sig: Take 1 tablet by mouth 2 times daily for 7 days     Dispense:  14 tablet     Refill:  0       Return if symptoms worsen or fail to improve, for follow up with PCP. Reviewed with the patient: current clinical status & medications. Side effects, adverse effects of the medication prescribed today, as well as treatment plan/rationale and result expectations have been discussed with the patient who expressed understanding. How can you care for yourself at home? Apply warm and dry compresses, a heating pad set on low, or a hot water bottle 3 or 4 times a day for pain. Keep a cloth between the heat source and your skin. Take antibiotics as directed. Do not stop taking them just because you feel better. You need to take the full course of antibiotics. Close follow up to evaluate treatment results and for coordination of care. I have reviewed the patient's medical history in detail and updated the computerized patient record.       Kayode Botello Bland Hilding - NP

## 2022-11-09 ASSESSMENT — ENCOUNTER SYMPTOMS
DIARRHEA: 0
COLOR CHANGE: 1
NAUSEA: 0

## 2022-11-11 DIAGNOSIS — R12 HEART BURN: ICD-10-CM

## 2022-11-11 RX ORDER — OMEPRAZOLE 40 MG/1
CAPSULE, DELAYED RELEASE ORAL
Qty: 90 CAPSULE | Refills: 1 | Status: SHIPPED | OUTPATIENT
Start: 2022-11-11 | End: 2022-11-15 | Stop reason: SDUPTHER

## 2022-11-15 ENCOUNTER — OFFICE VISIT (OUTPATIENT)
Dept: FAMILY MEDICINE CLINIC | Age: 62
End: 2022-11-15
Payer: MEDICARE

## 2022-11-15 VITALS
WEIGHT: 201.4 LBS | HEART RATE: 72 BPM | HEIGHT: 70 IN | BODY MASS INDEX: 28.83 KG/M2 | TEMPERATURE: 97.4 F | DIASTOLIC BLOOD PRESSURE: 78 MMHG | OXYGEN SATURATION: 95 % | SYSTOLIC BLOOD PRESSURE: 130 MMHG

## 2022-11-15 DIAGNOSIS — Z12.5 SCREENING PSA (PROSTATE SPECIFIC ANTIGEN): ICD-10-CM

## 2022-11-15 DIAGNOSIS — R73.9 HYPERGLYCEMIA: ICD-10-CM

## 2022-11-15 DIAGNOSIS — B35.4 RINGWORM OF BODY: ICD-10-CM

## 2022-11-15 DIAGNOSIS — R12 HEART BURN: ICD-10-CM

## 2022-11-15 DIAGNOSIS — L02.91 ABSCESS: ICD-10-CM

## 2022-11-15 DIAGNOSIS — I10 ESSENTIAL HYPERTENSION: ICD-10-CM

## 2022-11-15 DIAGNOSIS — Z00.00 MEDICARE ANNUAL WELLNESS VISIT, SUBSEQUENT: Primary | ICD-10-CM

## 2022-11-15 PROCEDURE — 3078F DIAST BP <80 MM HG: CPT | Performed by: FAMILY MEDICINE

## 2022-11-15 PROCEDURE — G0439 PPPS, SUBSEQ VISIT: HCPCS | Performed by: FAMILY MEDICINE

## 2022-11-15 PROCEDURE — 3074F SYST BP LT 130 MM HG: CPT | Performed by: FAMILY MEDICINE

## 2022-11-15 RX ORDER — CLINDAMYCIN HYDROCHLORIDE 300 MG/1
300 CAPSULE ORAL 3 TIMES DAILY
Qty: 30 CAPSULE | Refills: 0 | Status: SHIPPED | OUTPATIENT
Start: 2022-11-15 | End: 2022-11-25

## 2022-11-15 RX ORDER — OMEPRAZOLE 40 MG/1
CAPSULE, DELAYED RELEASE ORAL
Qty: 90 CAPSULE | Refills: 1 | Status: SHIPPED | OUTPATIENT
Start: 2022-11-15

## 2022-11-15 RX ORDER — PRENATAL VIT 91/IRON/FOLIC/DHA 28-975-200
COMBINATION PACKAGE (EA) ORAL
Qty: 30 G | Refills: 0 | Status: SHIPPED | OUTPATIENT
Start: 2022-11-15

## 2022-11-15 ASSESSMENT — LIFESTYLE VARIABLES
HOW MANY STANDARD DRINKS CONTAINING ALCOHOL DO YOU HAVE ON A TYPICAL DAY: 1 OR 2
HOW OFTEN DO YOU HAVE A DRINK CONTAINING ALCOHOL: MONTHLY OR LESS

## 2022-11-15 ASSESSMENT — PATIENT HEALTH QUESTIONNAIRE - PHQ9
SUM OF ALL RESPONSES TO PHQ QUESTIONS 1-9: 0
2. FEELING DOWN, DEPRESSED OR HOPELESS: 0
SUM OF ALL RESPONSES TO PHQ QUESTIONS 1-9: 0
1. LITTLE INTEREST OR PLEASURE IN DOING THINGS: 0
SUM OF ALL RESPONSES TO PHQ9 QUESTIONS 1 & 2: 0

## 2022-11-15 NOTE — PATIENT INSTRUCTIONS
Personalized Preventive Plan for Joey Amaya - 11/15/2022  Medicare offers a range of preventive health benefits. Some of the tests and screenings are paid in full while other may be subject to a deductible, co-insurance, and/or copay. Some of these benefits include a comprehensive review of your medical history including lifestyle, illnesses that may run in your family, and various assessments and screenings as appropriate. After reviewing your medical record and screening and assessments performed today your provider may have ordered immunizations, labs, imaging, and/or referrals for you. A list of these orders (if applicable) as well as your Preventive Care list are included within your After Visit Summary for your review. Other Preventive Recommendations:    A preventive eye exam performed by an eye specialist is recommended every 1-2 years to screen for glaucoma; cataracts, macular degeneration, and other eye disorders. A preventive dental visit is recommended every 6 months. Try to get at least 150 minutes of exercise per week or 10,000 steps per day on a pedometer . Order or download the FREE \"Exercise & Physical Activity: Your Everyday Guide\" from [employer]. Call 8-173.274.5386 or search [employer] online. You need 9301-0407 mg of calcium and 6803-3927 IU of vitamin D per day. It is possible to meet your calcium requirement with diet alone, but a vitamin D supplement is usually necessary to meet this goal.  When exposed to the sun, use a sunscreen that protects against both UVA and UVB radiation with an SPF of 30 or greater. Reapply every 2 to 3 hours or after sweating, drying off with a towel, or swimming. Always wear a seat belt when traveling in a car. Always wear a helmet when riding a bicycle or motorcycle.

## 2022-11-15 NOTE — PROGRESS NOTES
Medicare Annual Wellness Visit    Carolyn Savage is here for Medicare AWV and Sore (Look at sore on back of right leg)    Assessment & Plan   Medicare annual wellness visit, subsequent  Ringworm of body  -     terbinafine (LAMISIL) 1 % cream; Apply topically 2 times daily. , Disp-30 g, R-0, Normal  Heart burn  -     omeprazole (PRILOSEC) 40 MG delayed release capsule; take 1 capsule by mouth every morning BEFORE BREAKFAST, Disp-90 capsule, R-1Normal  Abscess  -     clindamycin (CLEOCIN) 300 MG capsule; Take 1 capsule by mouth 3 times daily for 10 days, Disp-30 capsule, R-0Normal  Screening PSA (prostate specific antigen)  -     PSA Screening; Future  Essential hypertension  -     Lipid Panel; Future  Hyperglycemia  -     Hemoglobin A1C; Future    Recommendations for Preventive Services Due: see orders and patient instructions/AVS.  Recommended screening schedule for the next 5-10 years is provided to the patient in written form: see Patient Instructions/AVS.     Return for Medicare Annual Wellness Visit in 1 year. Subjective   Chief Complaint   Patient presents with    Medicare AWV    Sore     Look at sore on back of right leg       Patient's complete Health Risk Assessment and screening values have been reviewed and are found in Flowsheets. The following problems were reviewed today and where indicated follow up appointments were made and/or referrals ordered.     Positive Risk Factor Screenings with Interventions:    Fall Risk:  Do you feel unsteady or are you worried about falling? : no  2 or more falls in past year?: (!) yes  Fall with injury in past year?: no   Fall Risk Interventions:    Home safety tips provided        Drug Use Screening:    DAST-10 Score Interpretation:  1-2: Low level - Monitor, re-assess at a later date; 3-5: Moderate level - Further Investigation; 6-8: Substantial level - Intensive Assessment; 9-10: Severe level - Intensive Assessment  Substance Use - Drug Use Interventions:  patient is not ready to change his/her recreational drug use behavior at this time       General Health and ACP:  General  In general, how would you say your health is?: Good  In the past 7 days, have you experienced any of the following: New or Increased Pain, New or Increased Fatigue, Loneliness, Social Isolation, Stress or Anger?: No  Do you get the social and emotional support that you need?: (!) No  Do you have a Living Will?: (!) No    Advance Directives       Power [facility] Will ACP-Advance Directive ACP-Power of     Not on File Not on File Not on File Not on File          General Health Risk Interventions:  No Living Will: [address] addressed with patient today    Health Habits/Nutrition:  Physical Activity: Inactive    Days of Exercise per Week: 0 days    Minutes of Exercise per Session: 0 min     Have you lost any weight without trying in the past 3 months?: No  Body mass index: (!) 28.89  Have you seen the dentist within the past year?: Yes  Health Habits/Nutrition Interventions: Activity limited due to back/leg issues     Hearing/Vision:  Do you or your family notice any trouble with your hearing that hasn't been managed with hearing aids?: No  Do you have difficulty driving, watching TV, or doing any of your daily activities because of your eyesight?: No  Have you had an eye exam within the past year?: (!) No  No results found. Hearing/Vision Interventions:  Vision concerns:  patient encouraged to make appointment with his/her eye specialist            Objective   Vitals:    11/15/22 1502   BP: 130/78   Site: Left Upper Arm   Pulse: 72   Temp: 97.4 °F (36.3 °C)   SpO2: 95%   Weight: 201 lb 6.4 oz (91.4 kg)   Height: 5' 10\" (1.778 m)      Body mass index is 28.9 kg/m².       Physical Exam:  /78 (Site: Left Upper Arm)   Pulse 72   Temp 97.4 °F (36.3 °C)   Ht 5' 10\" (1.778 m)   Wt 201 lb 6.4 oz (91.4 kg)   SpO2 95%   BMI 28.90 kg/m²     Gen: Well, NAD, Alert, Oriented x 3 HEENT: EOMI, eyes clear, MMM  Skin: without rash or jaundice  Neck: no significant lymphadenopathy or thyromegaly  Lungs: CTA B w/out Rales/Wheezes/Rhonchi, Good respiratory effort   Heart: RRR, S1S2, w/out M/R/G, non-displaced PMI   Ext: chronic mild venous stasis changes   Neuro: Neurovascularly intact w/ Sensory/Motor intact UE/LE Bilaterally. Allergies   Allergen Reactions    Statins     Proscar [Finasteride] Hives and Swelling     Prior to Visit Medications    Medication Sig Taking? Authorizing Provider   terbinafine (LAMISIL) 1 % cream Apply topically 2 times daily.  Yes Yun Tamez MD   omeprazole (PRILOSEC) 40 MG delayed release capsule take 1 capsule by mouth every morning BEFORE BREAKFAST Yes Yun Tamez MD   clindamycin (CLEOCIN) 300 MG capsule Take 1 capsule by mouth 3 times daily for 10 days Yes Yun Tamez MD   oxybutynin (DITROPAN-XL) 10 MG extended release tablet Take 1 tablet by mouth every evening Yes Yun Tamez MD   tamsulosin (FLOMAX) 0.4 MG capsule Take 1 capsule by mouth daily Yes Yun Tamez MD   amLODIPine (NORVASC) 10 MG tablet take 1 tablet by mouth once daily Yes Yun Tamez MD   metoprolol tartrate (LOPRESSOR) 25 MG tablet take 1 tablet by mouth twice a day Yes Yun Tamez MD   amoxicillin-clavulanate (AUGMENTIN) 875-125 MG per tablet Take 1 tablet by mouth 2 times daily for 7 days  Patient not taking: Reported on 11/15/2022  SHREYA Ashley - NP       [facility] (Including outside providers/suppliers regularly involved in providing care):   Patient Care Team:  Yun Tamez MD as PCP - General (Family Medicine)  Yun Tamze MD as PCP - [facility]  Randi Richey MD (Urology)     Reviewed and updated this visit:  Tobacco  Allergies  Meds  Problems  Med Hx  Surg Hx  Soc Hx  Fam Hx

## 2022-12-15 ENCOUNTER — OFFICE VISIT (OUTPATIENT)
Dept: FAMILY MEDICINE CLINIC | Age: 62
End: 2022-12-15
Payer: MEDICARE

## 2022-12-15 VITALS
BODY MASS INDEX: 29.46 KG/M2 | WEIGHT: 205.8 LBS | RESPIRATION RATE: 18 BRPM | SYSTOLIC BLOOD PRESSURE: 110 MMHG | OXYGEN SATURATION: 96 % | HEART RATE: 69 BPM | DIASTOLIC BLOOD PRESSURE: 70 MMHG | TEMPERATURE: 97.8 F | HEIGHT: 70 IN

## 2022-12-15 DIAGNOSIS — L73.9 FOLLICULITIS: Primary | ICD-10-CM

## 2022-12-15 DIAGNOSIS — L82.1 SEBORRHEIC KERATOSES: ICD-10-CM

## 2022-12-15 DIAGNOSIS — Z85.828 H/O BASAL CELL CARCINOMA EXCISION: ICD-10-CM

## 2022-12-15 DIAGNOSIS — Z98.890 H/O BASAL CELL CARCINOMA EXCISION: ICD-10-CM

## 2022-12-15 PROCEDURE — 3078F DIAST BP <80 MM HG: CPT | Performed by: FAMILY MEDICINE

## 2022-12-15 PROCEDURE — 3074F SYST BP LT 130 MM HG: CPT | Performed by: FAMILY MEDICINE

## 2022-12-15 PROCEDURE — 99213 OFFICE O/P EST LOW 20 MIN: CPT | Performed by: FAMILY MEDICINE

## 2022-12-15 NOTE — PROGRESS NOTES
Diagnosis Orders   1. Folliculitis        2. H/O basal cell carcinoma excision        3. Seborrheic keratoses          Return in about 1 year (around 12/15/2023) for 15 min skin check. Patient Instructions   Skin exam negative for precancerous or cancerous concerns. Follow-up exam in 1 year. Folliculitis is resolving on its own. No further treatment. Encourage patient to do basic skin care. Educated patient on benign nature of seborrheic keratosis. Subjective:      Patient ID: Anju Gerardo is a 63 y.o. male who presents for:  Chief Complaint   Patient presents with    Skin Exam     Patient is here for a skin check on basal cell carcinoma. Patient denies any concern for new cancerous lesions but he did have a cyst on his leg that he use the heating pad on and would like to have checked. No fever or chills. No drainage that he is aware of. History of basal cell cancer on the chest.      Current Outpatient Medications on File Prior to Visit   Medication Sig Dispense Refill    terbinafine (LAMISIL) 1 % cream Apply topically 2 times daily. 30 g 0    omeprazole (PRILOSEC) 40 MG delayed release capsule take 1 capsule by mouth every morning BEFORE BREAKFAST 90 capsule 1    oxybutynin (DITROPAN-XL) 10 MG extended release tablet Take 1 tablet by mouth every evening 90 tablet 3    tamsulosin (FLOMAX) 0.4 MG capsule Take 1 capsule by mouth daily 90 capsule 3    amLODIPine (NORVASC) 10 MG tablet take 1 tablet by mouth once daily 90 tablet 3    metoprolol tartrate (LOPRESSOR) 25 MG tablet take 1 tablet by mouth twice a day 180 tablet 3     No current facility-administered medications on file prior to visit.      Past Medical History:   Diagnosis Date    Abnormal finding on EKG 7/11/2017    Chronic back pain     Erectile dysfunction     HTN (hypertension) 1/13/2017    Sleep apnea     Substance abuse ([facility] [address]     Urinary incontinence      Past Surgical History:   Procedure Laterality Date    CYSTOSCOPY 2017    [address] [facility] MD  BILATERAL RETROGRADE PYELOGRAMS BLADDER BX FULGURATION    FRACTURE SURGERY      LEG SURGERY Bilateral     screws and plates in both after broken      Social History     Socioeconomic History    Marital status: Single     Spouse name: Not on file    Number of children: Not on file    Years of education: Not on file    Highest education level: Not on file   Occupational History    Not on file   Tobacco Use    Smoking status: Former     Packs/day: 1.00     Years: 20.00     Pack years: 20.00     Types: Cigarettes     Quit date: 12/15/2021     Years since quittin.0    Smokeless tobacco: Never   Substance and Sexual Activity    Alcohol use: Yes     Alcohol/week: 5.0 standard drinks     Types: 5 Shots of liquor per week     Comment: socially    Drug use: Yes     Frequency: 1.0 times per week     Types: Marijuana Malina Legions)     Comment: daily    Sexual activity: Not on file   Other Topics Concern    Not on file   Social History Narrative    Not on file     Social Determinants of Health     Financial Resource Strain: Low Risk     Difficulty of Paying Living Expenses: Not hard at all   Food Insecurity: No Food Insecurity    Worried About Running Out of Food in the Last Year: Never true    Ran Out of Food in the Last Year: Never true   Transportation Needs: Not on file   Physical Activity: Inactive    Days of Exercise per Week: 0 days    Minutes of Exercise per Session: 0 min   Stress: Not on file   Social Connections: Not on file   Intimate Partner Violence: Not on file   Housing Stability: Not on file     History reviewed. No pertinent family history. Allergies:  Statins and Proscar [finasteride]    Review of Systems   Constitutional:  Negative for chills and fever. Skin:  Positive for wound. Allergic/Immunologic: Negative for environmental allergies, food allergies and immunocompromised state. Hematological:  Negative for adenopathy. Does not bruise/bleed easily.    Psychiatric/Behavioral: Negative for behavioral problems and sleep disturbance. Objective:   /70 (Site: Left Upper Arm, Position: Sitting, Cuff Size: Large Adult)   Pulse 69   Temp 97.8 °F (36.6 °C) (Temporal)   Resp 18   Ht 5' 10\" (1.778 m)   Wt 205 lb 12.8 oz (93.4 kg)   SpO2 96%   BMI 29.53 kg/m²     Physical Exam  Constitutional:       General: He is not in acute distress. Appearance: Normal appearance. He is well-developed. He is not toxic-appearing. HENT:      Head: Normocephalic and atraumatic. Right Ear: Hearing and tympanic membrane normal.      Left Ear: Hearing and tympanic membrane normal.      Nose: Nose normal. No nasal deformity. Eyes:      General: Lids are normal.         Right eye: No discharge. Left eye: No discharge. Conjunctiva/sclera: Conjunctivae normal.      Pupils: Pupils are equal, round, and reactive to light. Neck:      Thyroid: No thyroid mass or thyromegaly. Vascular: No JVD. Trachea: Trachea and phonation normal.   Cardiovascular:      Rate and Rhythm: Normal rate and regular rhythm. Pulmonary:      Effort: No accessory muscle usage or respiratory distress. Musculoskeletal:      Cervical back: Full passive range of motion without pain. Comments: FROM all large muscle groups and joints as witnessed when walking to exam table, getting on, and getting off the exam table. Skin:     General: Skin is warm and dry. Findings: No rash. Comments: Purpleish circular discoloration flat with no pain on palpation on the posterior right thigh with crust formation noted. No purulence. Skin exam negative for precancerous or cancerous appearing lesions. Infrequently noted stuck on appearing soft brown oval lesions   Neurological:      Mental Status: He is alert. Motor: No tremor or atrophy. Gait: Gait normal.   Psychiatric:         Speech: Speech normal.         Behavior: Behavior normal.         Thought Content:  Thought content normal.       No results found for this visit on 12/15/22. No results found for this or any previous visit (from the past 2016 hour(s)). [] Pt was seen by provider for      Minutes  Counseling and coordination of care was done for all assessment diagnosis listed for today with patient and any family/friend present. More than 50% of this visit was spent coordinating current care, obtaining information for prior records, and counseling for current plan of action. Assessment:       Diagnosis Orders   1. Folliculitis        2. H/O basal cell carcinoma excision        3. Seborrheic keratoses              No orders of the defined types were placed in this encounter. No orders of the defined types were placed in this encounter. Medication List            Accurate as of December 15, 2022 10:30 AM. If you have any questions, ask your nurse or doctor. CONTINUE taking these medications      amLODIPine 10 MG tablet  Commonly known as: NORVASC  take 1 tablet by mouth once daily     metoprolol tartrate 25 MG tablet  Commonly known as: LOPRESSOR  take 1 tablet by mouth twice a day     omeprazole 40 MG delayed release capsule  Commonly known as: PRILOSEC  take 1 capsule by mouth every morning BEFORE BREAKFAST     oxybutynin 10 MG extended release tablet  Commonly known as: DITROPAN-XL  Take 1 tablet by mouth every evening     tamsulosin 0.4 MG capsule  Commonly known as: FLOMAX  Take 1 capsule by mouth daily     terbinafine 1 % cream  Commonly known as: LAMISIL  Apply topically 2 times daily. Plan:   Return in about 1 year (around 12/15/2023) for 15 min skin check. Patient Instructions   Skin exam negative for precancerous or cancerous concerns. Follow-up exam in 1 year. Folliculitis is resolving on its own. No further treatment. Encourage patient to do basic skin care. Educated patient on benign nature of seborrheic keratosis.     This note was partially created with the assistance of dictation. This may lead to grammatical or spelling errors. Villa Lin M.D.

## 2022-12-15 NOTE — PATIENT INSTRUCTIONS
Skin exam negative for precancerous or cancerous concerns. Follow-up exam in 1 year. Folliculitis is resolving on its own. No further treatment. Encourage patient to do basic skin care. Educated patient on benign nature of seborrheic keratosis.

## 2023-02-05 DIAGNOSIS — I10 ESSENTIAL HYPERTENSION: ICD-10-CM

## 2023-02-05 NOTE — TELEPHONE ENCOUNTER
requesting medication refill.  Please approve or deny this request.    Rx requested:  Requested Prescriptions     Pending Prescriptions Disp Refills    amLODIPine (NORVASC) 10 MG tablet [Pharmacy Med Name: AMLODIPINE BESYLATE 10 MG TAB] 90 tablet 3     Sig: take 1 tablet by mouth once daily    metoprolol tartrate (LOPRESSOR) 25 MG tablet [Pharmacy Med Name: METOPROLOL TARTRATE 25 MG TAB] 180 tablet 3     Sig: take 1 tablet by mouth twice a day         Last Office Visit:   11/15/2022      Next Visit Date:  Future Appointments   Date Time Provider Melody Romero   11/20/2023  3:00 PM Misael Ramírez MD [facility] [place]

## 2023-02-06 RX ORDER — AMLODIPINE BESYLATE 10 MG/1
TABLET ORAL
Qty: 90 TABLET | Refills: 3 | Status: SHIPPED | OUTPATIENT
Start: 2023-02-06

## 2023-03-03 ENCOUNTER — OFFICE VISIT (OUTPATIENT)
Dept: FAMILY MEDICINE CLINIC | Age: 63
End: 2023-03-03
Payer: MEDICARE

## 2023-03-03 VITALS
OXYGEN SATURATION: 96 % | WEIGHT: 200 LBS | DIASTOLIC BLOOD PRESSURE: 78 MMHG | HEART RATE: 77 BPM | HEIGHT: 70 IN | BODY MASS INDEX: 28.63 KG/M2 | SYSTOLIC BLOOD PRESSURE: 124 MMHG

## 2023-03-03 DIAGNOSIS — Z87.891 PERSONAL HISTORY OF TOBACCO USE: ICD-10-CM

## 2023-03-03 DIAGNOSIS — M79.605 ACUTE LEG PAIN, LEFT: Primary | ICD-10-CM

## 2023-03-03 PROCEDURE — G0296 VISIT TO DETERM LDCT ELIG: HCPCS | Performed by: NURSE PRACTITIONER

## 2023-03-03 PROCEDURE — 3074F SYST BP LT 130 MM HG: CPT | Performed by: NURSE PRACTITIONER

## 2023-03-03 PROCEDURE — 99213 OFFICE O/P EST LOW 20 MIN: CPT | Performed by: NURSE PRACTITIONER

## 2023-03-03 PROCEDURE — 3078F DIAST BP <80 MM HG: CPT | Performed by: NURSE PRACTITIONER

## 2023-03-03 SDOH — ECONOMIC STABILITY: HOUSING INSECURITY
IN THE LAST 12 MONTHS, WAS THERE A TIME WHEN YOU DID NOT HAVE A STEADY PLACE TO SLEEP OR SLEPT IN A SHELTER (INCLUDING NOW)?: NO

## 2023-03-03 SDOH — ECONOMIC STABILITY: FOOD INSECURITY: WITHIN THE PAST 12 MONTHS, THE FOOD YOU BOUGHT JUST DIDN'T LAST AND YOU DIDN'T HAVE MONEY TO GET MORE.: NEVER TRUE

## 2023-03-03 SDOH — ECONOMIC STABILITY: INCOME INSECURITY: HOW HARD IS IT FOR YOU TO PAY FOR THE VERY BASICS LIKE FOOD, HOUSING, MEDICAL CARE, AND HEATING?: NOT HARD AT ALL

## 2023-03-03 SDOH — ECONOMIC STABILITY: FOOD INSECURITY: WITHIN THE PAST 12 MONTHS, YOU WORRIED THAT YOUR FOOD WOULD RUN OUT BEFORE YOU GOT MONEY TO BUY MORE.: NEVER TRUE

## 2023-03-03 ASSESSMENT — PATIENT HEALTH QUESTIONNAIRE - PHQ9
2. FEELING DOWN, DEPRESSED OR HOPELESS: 0
SUM OF ALL RESPONSES TO PHQ9 QUESTIONS 1 & 2: 0
SUM OF ALL RESPONSES TO PHQ QUESTIONS 1-9: 0
1. LITTLE INTEREST OR PLEASURE IN DOING THINGS: 0

## 2023-03-03 ASSESSMENT — ENCOUNTER SYMPTOMS
SHORTNESS OF BREATH: 0
EYE PAIN: 0
COUGH: 0
ABDOMINAL PAIN: 0
TROUBLE SWALLOWING: 0
CHEST TIGHTNESS: 0
CONSTIPATION: 0
DIARRHEA: 0

## 2023-03-03 NOTE — PROGRESS NOTES
Discussed with the patient the current USPSTF guidelines released March 9, 2021 for screening for lung cancer. For adults aged 48 to [de-identified] years who have a 20 pack-year smoking history and currently smoke or have quit within the past 15 years the grade B recommendation is to:  Screen for lung cancer with low-dose computed tomography (LDCT) every year. Stop screening once a person has not smoked for 15 years or has a health problem that limits life expectancy or the ability to have lung surgery. The patient  reports that he has been smoking cigarettes. He started smoking about 12 months ago. He has a 20.00 pack-year smoking history. He has never used smokeless tobacco.. Discussed with patient the risks and benefits of screening, including over-diagnosis, false positive rate, and total radiation exposure. The patient currently exhibits no signs or symptoms suggestive of lung cancer. Discussed with patient the importance of compliance with yearly annual lung cancer screenings and willingness to undergo diagnosis and treatment if screening scan is positive. In addition, the patient was counseled regarding the importance of remaining smoke free and/or total smoking cessation.     Also reviewed the following if the patient has Medicare that as of February 10, 2022, Medicare only covers LDCT screening in patients aged 51-72 with at least a 20 pack-year smoking history who currently smoke or have quit in the last 15 years

## 2023-03-03 NOTE — PATIENT INSTRUCTIONS

## 2023-03-03 NOTE — PROGRESS NOTES
Subjective  Chief Complaint   Patient presents with    Leg Pain     Pt states plate and 6 screws in left lower leg, feels that if screws are lose       HPI    Discuss leg pain. Has a plate and 6 screws in left lower leg that were placed 40 years ago. Area has now become very tender over the past 2 weeks. Concerned that screws are coming out. Denies any known recent injury, denies any calf pain or tenderness, denies any bruising or skin changes. Past Medical History:   Diagnosis Date    Abnormal finding on EKG 7/11/2017    Chronic back pain     Erectile dysfunction     HTN (hypertension) 1/13/2017    Sleep apnea     Substance abuse ([facility] [address]     Urinary incontinence      Patient Active Problem List    Diagnosis Date Noted    H/O basal cell carcinoma excision 06/16/2022    Abnormal LFTs     Acute cystitis with hematuria     Flank pain 03/24/2022    Abnormal finding on EKG 07/11/2017    Hematuria 01/13/2017    Urinary obstruction 01/13/2017    HTN (hypertension) 01/13/2017     Past Surgical History:   Procedure Laterality Date    CYSTOSCOPY  06/07/2017    [facility] MD  BILATERAL RETROGRADE PYELOGRAMS BLADDER BX FULGURATION    FRACTURE SURGERY      LEG SURGERY Bilateral     screws and plates in both after broken      No family history on file. Social History     Socioeconomic History    Marital status: Single     Spouse name: None    Number of children: None    Years of education: None    Highest education level: None   Tobacco Use    Smoking status: Every Day     Packs/day: 1.00     Years: 20.00     Pack years: 20.00     Types: Cigarettes     Start date: 2/2/2022    Smokeless tobacco: Never    Tobacco comments:     Pt states he did quit 12/2021 but started back up in feb 2022   Substance and Sexual Activity    Alcohol use:  Yes     Alcohol/week: 5.0 standard drinks     Types: 5 Shots of liquor per week     Comment: socially    Drug use: Yes     Frequency: 1.0 times per week     Types: Marijuana Anisa Loco)     Comment: daily     Social Determinants of Health     Financial Resource Strain: Low Risk     Difficulty of Paying Living Expenses: Not hard at all   Food Insecurity: No Food Insecurity    Worried About Running Out of Food in the Last Year: Never true    Ran Out of Food in the Last Year: Never true   Transportation Needs: Unknown    Lack of Transportation (Non-Medical): No   Physical Activity: Inactive    Days of Exercise per Week: 0 days    Minutes of Exercise per Session: 0 min   Housing Stability: Unknown    Unstable Housing in the Last Year: No     Current Outpatient Medications on File Prior to Visit   Medication Sig Dispense Refill    amLODIPine (NORVASC) 10 MG tablet take 1 tablet by mouth once daily 90 tablet 3    metoprolol tartrate (LOPRESSOR) 25 MG tablet take 1 tablet by mouth twice a day 180 tablet 3    omeprazole (PRILOSEC) 40 MG delayed release capsule take 1 capsule by mouth every morning BEFORE BREAKFAST 90 capsule 1    oxybutynin (DITROPAN-XL) 10 MG extended release tablet Take 1 tablet by mouth every evening 90 tablet 3    tamsulosin (FLOMAX) 0.4 MG capsule Take 1 capsule by mouth daily 90 capsule 3    terbinafine (LAMISIL) 1 % cream Apply topically 2 times daily. (Patient not taking: Reported on 3/3/2023) 30 g 0     No current facility-administered medications on file prior to visit. Allergies   Allergen Reactions    Statins     Proscar [Finasteride] Hives and Swelling       Review of Systems   Constitutional:  Negative for activity change, appetite change and fatigue. HENT:  Negative for ear pain, hearing loss and trouble swallowing. Eyes:  Negative for pain and visual disturbance. Respiratory:  Negative for cough, chest tightness and shortness of breath. Cardiovascular:  Negative for chest pain, palpitations and leg swelling. Gastrointestinal:  Negative for abdominal pain, constipation and diarrhea. Genitourinary:  Negative for difficulty urinating.    Musculoskeletal:  Positive for arthralgias. Neurological:  Negative for dizziness and light-headedness. Objective  Vitals:    03/03/23 0850   BP: 124/78   Pulse: 77   SpO2: 96%   Weight: 200 lb (90.7 kg)   Height: 5' 10\" (1.778 m)     Physical Exam  Constitutional:       General: He is not in acute distress. Appearance: Normal appearance. He is normal weight. He is not ill-appearing, toxic-appearing or diaphoretic. HENT:      Head: Normocephalic and atraumatic. Right Ear: External ear normal.      Left Ear: External ear normal.      Nose: Nose normal. No congestion or rhinorrhea. Eyes:      Extraocular Movements: Extraocular movements intact. Conjunctiva/sclera: Conjunctivae normal.      Pupils: Pupils are equal, round, and reactive to light. Cardiovascular:      Rate and Rhythm: Normal rate and regular rhythm. Pulses: Normal pulses. Heart sounds: Normal heart sounds. No murmur heard. Pulmonary:      Effort: Pulmonary effort is normal. No respiratory distress. Breath sounds: Normal breath sounds. No stridor. No wheezing, rhonchi or rales. Chest:      Chest wall: No tenderness. Musculoskeletal:         General: Normal range of motion. Cervical back: Normal range of motion and neck supple. No tenderness. Right lower leg: No edema. Left lower leg: No edema. Legs:    Lymphadenopathy:      Cervical: No cervical adenopathy. Skin:     General: Skin is warm. Capillary Refill: Capillary refill takes less than 2 seconds. Coloration: Skin is not jaundiced. Findings: No erythema or lesion. Neurological:      General: No focal deficit present. Mental Status: He is alert and oriented to person, place, and time. Mental status is at baseline. Cranial Nerves: No cranial nerve deficit. Coordination: Coordination normal.      Gait: Gait normal.   Psychiatric:         Mood and Affect: Mood normal.         Behavior: Behavior normal.         Thought Content:  Thought content normal.         Judgment: Judgment normal.       Assessment& Plan     Diagnosis Orders   1. Acute leg pain, left  XR TIBIA FIBULA LEFT (2 VIEWS)    [address] [facility] [place]      2. Personal history of tobacco use  [place] VISIT TO DISCUSS LUNG CA SCREEN W LDCT    CT Lung Screen (Annual/Baseline)        Xray and referral as ordered. Will f/u after imaging. Side effects, adverse effects of the medication prescribed today, as well as treatment plan/ rationale and result expectations have been discussed with the patient who expresses understanding and desires to proceed. Close follow up to evaluate treatment results and for coordination of care. I have reviewed the patient's medical history in detail and updated the computerized patient record. As always, patient is advised that if symptoms worsen in any way they will proceed to the nearest emergency room. Orders Placed This Encounter   Procedures    CT Lung Screen (Annual/Baseline)     Age: Patient is 66 y.o. Smoking History: Social History    Tobacco Use      Smoking status: Every Day        Packs/day: 1.00        Years: 20.00        Pack years: 20        Types: Cigarettes        Start date: 2/2/2022      Smokeless tobacco: Never      Tobacco comments: Pt states he did quit 12/2021 but started back up in feb 2022    Alcohol use: Yes      Alcohol/week: 5.0 standard drinks      Types: 5 Shots of liquor per week      Comment: socially    Drug use: Yes      Frequency: 1.0 times per week      Types: Marijuana Mike Maik)      Comment: daily   Pack years: 20    Date of last lung cancer screening: No previous lung cancer screening exam     Standing Status:   Future     Standing Expiration Date:   9/3/2024     Order Specific Question:   Is there documentation of shared decision making? Answer:   Yes     Order Specific Question:   Is this a low dose CT or a routine CT?      Answer:   Low Dose CT [1]     Order Specific Question:   Is this the first (baseline) CT or an annual exam?     Answer:   Baseline [1]     Order Specific Question:   Does the patient show any signs or symptoms of lung cancer? Answer:   No     Order Specific Question:   Smoking Status? Answer:   Every Day [1]     Order Specific Question:   Smoking packs per day? Answer:   1     Order Specific Question:   Years smoking? Answer:   20    XR TIBIA FIBULA LEFT (2 VIEWS)     Standing Status:   Future     Number of Occurrences:   1     Standing Expiration Date:   3/3/2024     Order Specific Question:   Reason for exam:     Answer:   acute left leg pain    [facility]     Referral Priority:   Routine     Referral Type:   Eval and Treat     Referral Reason:   Specialty Services Required     Requested Specialty:   Orthopedic Surgery     Number of Visits Requested:   1    [place] VISIT TO DISCUSS LUNG CA SCREEN W LDCT       No orders of the defined types were placed in this encounter. There are no discontinued medications. Return if symptoms worsen or fail to improve.     Carin Daniels, APRN - CNP

## 2023-03-10 ENCOUNTER — OFFICE VISIT (OUTPATIENT)
Dept: ORTHOPEDIC SURGERY | Age: 63
End: 2023-03-10

## 2023-03-10 VITALS
HEART RATE: 102 BPM | TEMPERATURE: 98.3 F | OXYGEN SATURATION: 96 % | HEIGHT: 70 IN | BODY MASS INDEX: 28.63 KG/M2 | WEIGHT: 200 LBS

## 2023-03-10 DIAGNOSIS — T84.84XA PAINFUL ORTHOPAEDIC HARDWARE (HCC): ICD-10-CM

## 2023-03-10 DIAGNOSIS — T84.84XA PAINFUL ORTHOPAEDIC HARDWARE (HCC): Primary | ICD-10-CM

## 2023-03-10 LAB
C-REACTIVE PROTEIN: 3.1 MG/L (ref 0–5)
SEDIMENTATION RATE, ERYTHROCYTE: 10 MM (ref 0–20)

## 2023-03-10 ASSESSMENT — ENCOUNTER SYMPTOMS: RESPIRATORY NEGATIVE: 1
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
## 2023-03-16 ENCOUNTER — HOSPITAL ENCOUNTER (OUTPATIENT)
Dept: CT IMAGING | Age: 63
Discharge: HOME | End: 2023-03-18
Payer: MEDICARE

## 2023-03-16 DIAGNOSIS — Z87.891 PERSONAL HISTORY OF TOBACCO USE: ICD-10-CM

## 2023-03-16 PROCEDURE — 71271 CT THORAX LUNG CANCER SCR C-: CPT

## 2023-03-17 ENCOUNTER — TELEPHONE (OUTPATIENT)
Dept: GASTROENTEROLOGY | Age: 63
End: 2023-03-17

## 2023-03-20 ENCOUNTER — HOSPITAL ENCOUNTER (OUTPATIENT)
Dept: ORTHOPEDIC SURGERY | Age: 63
Discharge: HOME | End: 2023-03-22

## 2023-03-20 ENCOUNTER — OFFICE VISIT (OUTPATIENT)
Dept: ORTHOPEDIC SURGERY | Age: 63
End: 2023-03-20
Payer: MEDICARE

## 2023-03-20 VITALS
HEIGHT: 70 IN | OXYGEN SATURATION: 96 % | TEMPERATURE: 96.9 F | BODY MASS INDEX: 28.63 KG/M2 | WEIGHT: 200 LBS | HEART RATE: 76 BPM

## 2023-03-20 DIAGNOSIS — S82.402P TIBIA/FIBULA FRACTURE, LEFT, CLOSED, WITH MALUNION, SUBSEQUENT ENCOUNTER: Primary | ICD-10-CM

## 2023-03-20 DIAGNOSIS — S82.202P TIBIA/FIBULA FRACTURE, LEFT, CLOSED, WITH MALUNION, SUBSEQUENT ENCOUNTER: Primary | ICD-10-CM

## 2023-03-20 DIAGNOSIS — M25.562 ACUTE PAIN OF LEFT KNEE: ICD-10-CM

## 2023-03-20 PROCEDURE — 99213 OFFICE O/P EST LOW 20 MIN: CPT | Performed by: ORTHOPAEDIC SURGERY

## 2023-03-20 NOTE — PROGRESS NOTES
Subjective:      Patient ID: Fabiana Vogt is a 66 y.o. male who presents today for:  Chief Complaint   Patient presents with    New Patient     Pt states pain left tibia and fibula region. HPI:  The patient is a 72-year-old male who has had prior open reduction internal fixation of a left tibia fracture. This was many years in the remote past.  He states that he is having increasing pain in the left tibia because he is feeling that ever since a spine infection that is left him with 50% less sensation in the right lower extremity is using the left lower extremity more. He is also short on the left side with regard to leg length. Past Medical History:   Diagnosis Date    Abnormal finding on EKG 7/11/2017    Chronic back pain     Erectile dysfunction     HTN (hypertension) 1/13/2017    Sleep apnea     Substance abuse ([facility] [address]     Urinary incontinence      Past Surgical History:   Procedure Laterality Date    CYSTOSCOPY  06/07/2017    [facility] MD  BILATERAL RETROGRADE PYELOGRAMS BLADDER BX FULGURATION    FRACTURE SURGERY      LEG SURGERY Bilateral     screws and plates in both after broken        Tobacco Use      Smoking status: Every Day        Packs/day: 1.00        Years: 20.00        Pack years: 20        Types: Cigarettes        Start date: 2/2/2022      Smokeless tobacco: Never      Tobacco comments: Pt states he did quit 12/2021 but started back up in feb 2022     reports current drug use. Frequency: 1.00 time per week. Drug: Marijuana Mesfin Gutierres. Allergies   Allergen Reactions    Statins     Proscar [Finasteride] Hives and Swelling       Current Outpatient Medications:     amLODIPine (NORVASC) 10 MG tablet, take 1 tablet by mouth once daily, Disp: 90 tablet, Rfl: 3    metoprolol tartrate (LOPRESSOR) 25 MG tablet, take 1 tablet by mouth twice a day, Disp: 180 tablet, Rfl: 3    terbinafine (LAMISIL) 1 % cream, Apply topically 2 times daily. , Disp: 30 g, Rfl: 0    omeprazole (PRILOSEC) 40 MG delayed release

## 2023-03-27 ENCOUNTER — HOSPITAL ENCOUNTER (OUTPATIENT)
Dept: CT IMAGING | Age: 63
Discharge: HOME | End: 2023-03-29
Payer: MEDICARE

## 2023-03-27 DIAGNOSIS — S82.402P TIBIA/FIBULA FRACTURE, LEFT, CLOSED, WITH MALUNION, SUBSEQUENT ENCOUNTER: ICD-10-CM

## 2023-03-27 DIAGNOSIS — S82.202P TIBIA/FIBULA FRACTURE, LEFT, CLOSED, WITH MALUNION, SUBSEQUENT ENCOUNTER: ICD-10-CM

## 2023-03-27 PROCEDURE — 6360000004 HC RX CONTRAST MEDICATION: Performed by: ORTHOPAEDIC SURGERY

## 2023-03-27 PROCEDURE — 73701 CT LOWER EXTREMITY W/DYE: CPT

## 2023-03-27 RX ADMIN — IOPAMIDOL 50 ML: 612 INJECTION, SOLUTION INTRAVENOUS at 16:26

## 2023-03-28 ENCOUNTER — HOSPITAL ENCOUNTER (OUTPATIENT)
Dept: ORTHOPEDIC SURGERY | Age: 63
Discharge: HOME | End: 2023-03-30
Payer: MEDICARE

## 2023-03-28 ENCOUNTER — OFFICE VISIT (OUTPATIENT)
Dept: ORTHOPEDIC SURGERY | Age: 63
End: 2023-03-28
Payer: MEDICARE

## 2023-03-28 DIAGNOSIS — S82.402D TIBIA/FIBULA FRACTURE, SHAFT, LEFT, CLOSED, WITH ROUTINE HEALING, SUBSEQUENT ENCOUNTER: ICD-10-CM

## 2023-03-28 DIAGNOSIS — M21.70 LEG LENGTH INEQUALITY: ICD-10-CM

## 2023-03-28 DIAGNOSIS — M21.70 LEG LENGTH INEQUALITY: Primary | ICD-10-CM

## 2023-03-28 DIAGNOSIS — S82.202D TIBIA/FIBULA FRACTURE, SHAFT, LEFT, CLOSED, WITH ROUTINE HEALING, SUBSEQUENT ENCOUNTER: ICD-10-CM

## 2023-03-28 PROCEDURE — 77073 BONE LENGTH STUDIES: CPT

## 2023-03-28 PROCEDURE — 77073 BONE LENGTH STUDIES: CPT | Performed by: ORTHOPAEDIC SURGERY

## 2023-03-28 PROCEDURE — 99213 OFFICE O/P EST LOW 20 MIN: CPT | Performed by: ORTHOPAEDIC SURGERY

## 2023-03-28 NOTE — PROGRESS NOTES
plate and screws transfixing the tibial fracture is  present, no evidence of loosening or infection of the hardware. No evidence  of hardware fracture. Fibular fracture appears well healed. Soft Tissue: No significant soft tissue edema or fluid collections. Joint: No significant degenerative changes. No osseous erosions. Impression: 1. Metallic compression plate fixation mid to distal tibial fracture,  well-healed. No evidence of delayed or nonunion. 2.  No evidence of loosening or infection of the metallic hardware. Assessment:       Diagnosis Orders   1. Leg length inequality  XR LEG LENGTH EVALUATION      2. Tibia/fibula fracture, shaft, left, closed, with routine healing, subsequent encounter             Plan:      CT scan was reviewed showing no infection or loosening. It did indicate that he had good bony bridging across the fracture site. He has pain just proximal to the plate. I explained to him that he is short on his right side not his left side. All along he was trying to add length to his left side and felt that this was not improving his symptoms. I think with proper lifts in his shoes this will help with his gait mechanics and may alleviate some of his left-sided tibial pain. I also told him that I cannot guarantee taking that plate out is going to take away all his pain. He also has complaints of hammertoe symptoms on the left foot. I did send him to podiatry for a consult. I can see him back on an as-needed basis.       [place] DO  Orthopedic and Spine Surgeon  [facility]  [phone]

## 2023-04-28 ENCOUNTER — OFFICE VISIT (OUTPATIENT)
Dept: FAMILY MEDICINE CLINIC | Age: 63
End: 2023-04-28
Payer: MEDICARE

## 2023-04-28 VITALS
TEMPERATURE: 97.5 F | DIASTOLIC BLOOD PRESSURE: 76 MMHG | OXYGEN SATURATION: 97 % | SYSTOLIC BLOOD PRESSURE: 122 MMHG | HEART RATE: 117 BPM | BODY MASS INDEX: 26.88 KG/M2 | WEIGHT: 192 LBS | HEIGHT: 71 IN

## 2023-04-28 DIAGNOSIS — R39.9 UTI SYMPTOMS: ICD-10-CM

## 2023-04-28 DIAGNOSIS — R39.9 UTI SYMPTOMS: Primary | ICD-10-CM

## 2023-04-28 LAB
BILIRUBIN, POC: NORMAL
BLOOD URINE, POC: NORMAL
CLARITY, POC: NORMAL
COLOR, POC: YELLOW
GLUCOSE URINE, POC: NORMAL
KETONES, POC: NORMAL
LEUKOCYTE EST, POC: NORMAL
NITRITE, POC: NORMAL
PH, POC: 6
PROTEIN, POC: NORMAL
SPECIFIC GRAVITY, POC: 1.02
UROBILINOGEN, POC: 0.2

## 2023-04-28 PROCEDURE — 99213 OFFICE O/P EST LOW 20 MIN: CPT | Performed by: NURSE PRACTITIONER

## 2023-04-28 PROCEDURE — 3078F DIAST BP <80 MM HG: CPT | Performed by: NURSE PRACTITIONER

## 2023-04-28 PROCEDURE — 81003 URINALYSIS AUTO W/O SCOPE: CPT | Performed by: NURSE PRACTITIONER

## 2023-04-28 PROCEDURE — 3074F SYST BP LT 130 MM HG: CPT | Performed by: NURSE PRACTITIONER

## 2023-04-28 RX ORDER — CIPROFLOXACIN 250 MG/1
250 TABLET, FILM COATED ORAL 2 TIMES DAILY
Qty: 10 TABLET | Refills: 0 | Status: SHIPPED | OUTPATIENT
Start: 2023-04-28 | End: 2023-05-03

## 2023-04-28 ASSESSMENT — ENCOUNTER SYMPTOMS
COUGH: 0
ABDOMINAL PAIN: 0
VOMITING: 0
NAUSEA: 0
SHORTNESS OF BREATH: 0
DIARRHEA: 0
WHEEZING: 0

## 2023-04-28 NOTE — PROGRESS NOTES
General: He is not in acute distress. Appearance: He is well-developed. HENT:      Head: Normocephalic. Cardiovascular:      Rate and Rhythm: Normal rate and regular rhythm. Heart sounds: Normal heart sounds. Pulmonary:      Effort: Pulmonary effort is normal. No respiratory distress. Breath sounds: Normal breath sounds. Abdominal:      General: Bowel sounds are normal.      Palpations: Abdomen is soft. Tenderness: There is no abdominal tenderness. There is no right CVA tenderness or left CVA tenderness. Musculoskeletal:         General: Normal range of motion. Skin:     General: Skin is warm and dry. Neurological:      Mental Status: He is alert and oriented to person, place, and time. Assessment:       Diagnosis Orders   1. UTI symptoms  POCT Urinalysis No Micro (Auto)    Culture, Urine    ciprofloxacin (CIPRO) 250 MG tablet            Plan:      Orders Placed This Encounter   Procedures    Culture, Urine     Standing Status:   Future     Standing Expiration Date:   4/28/2024     Order Specific Question:   Specify (ex-cath, midstream, cysto, etc)? Answer:   midstream    POCT Urinalysis No Micro (Auto)     UA abnormal. Will follow up with culture results and additional recommendations as indicated. Medication administration and side effects were discussed. Reviewed symptoms requiring follow up/ER. Patient verbalizes understanding. I have reviewed and updated the electronic medical record. Return if symptoms worsen or fail to improve, for follow up with PCP.     SHREYA Antunez NP

## 2023-04-30 LAB
BACTERIA UR CULT: ABNORMAL
BACTERIA UR CULT: ABNORMAL
ORGANISM: ABNORMAL

## 2023-05-13 DIAGNOSIS — R35.0 BENIGN PROSTATIC HYPERPLASIA WITH URINARY FREQUENCY: ICD-10-CM

## 2023-05-13 DIAGNOSIS — R35.0 URINARY FREQUENCY: ICD-10-CM

## 2023-05-13 DIAGNOSIS — N40.1 BENIGN PROSTATIC HYPERPLASIA WITH URINARY FREQUENCY: ICD-10-CM

## 2023-05-15 ENCOUNTER — OFFICE VISIT (OUTPATIENT)
Dept: FAMILY MEDICINE CLINIC | Age: 63
End: 2023-05-15
Payer: MEDICARE

## 2023-05-15 VITALS
OXYGEN SATURATION: 97 % | TEMPERATURE: 97.8 F | SYSTOLIC BLOOD PRESSURE: 120 MMHG | DIASTOLIC BLOOD PRESSURE: 78 MMHG | HEART RATE: 102 BPM | WEIGHT: 192 LBS | RESPIRATION RATE: 14 BRPM | BODY MASS INDEX: 26.88 KG/M2 | HEIGHT: 71 IN

## 2023-05-15 DIAGNOSIS — N30.01 ACUTE CYSTITIS WITH HEMATURIA: Primary | ICD-10-CM

## 2023-05-15 DIAGNOSIS — R39.9 UTI SYMPTOMS: ICD-10-CM

## 2023-05-15 LAB
BILIRUBIN, POC: ABNORMAL
BLOOD URINE, POC: ABNORMAL
CLARITY, POC: ABNORMAL
COLOR, POC: ABNORMAL
GLUCOSE URINE, POC: ABNORMAL
KETONES, POC: ABNORMAL
LEUKOCYTE EST, POC: ABNORMAL
NITRITE, POC: ABNORMAL
PH, POC: 6
PROTEIN, POC: ABNORMAL
SPECIFIC GRAVITY, POC: 1.01
UROBILINOGEN, POC: ABNORMAL

## 2023-05-15 PROCEDURE — 3074F SYST BP LT 130 MM HG: CPT | Performed by: NURSE PRACTITIONER

## 2023-05-15 PROCEDURE — 99213 OFFICE O/P EST LOW 20 MIN: CPT | Performed by: NURSE PRACTITIONER

## 2023-05-15 PROCEDURE — 3078F DIAST BP <80 MM HG: CPT | Performed by: NURSE PRACTITIONER

## 2023-05-15 PROCEDURE — 81003 URINALYSIS AUTO W/O SCOPE: CPT | Performed by: NURSE PRACTITIONER

## 2023-05-15 RX ORDER — SULFAMETHOXAZOLE AND TRIMETHOPRIM 800; 160 MG/1; MG/1
1 TABLET ORAL 2 TIMES DAILY
Qty: 14 TABLET | Refills: 0 | Status: SHIPPED | OUTPATIENT
Start: 2023-05-15 | End: 2023-05-20

## 2023-05-15 RX ORDER — TAMSULOSIN HYDROCHLORIDE 0.4 MG/1
CAPSULE ORAL
Qty: 90 CAPSULE | Refills: 3 | Status: SHIPPED | OUTPATIENT
Start: 2023-05-15

## 2023-05-15 ASSESSMENT — ENCOUNTER SYMPTOMS
TROUBLE SWALLOWING: 0
ABDOMINAL DISTENTION: 0
CONSTIPATION: 0
EYE ITCHING: 0
DIARRHEA: 0
SHORTNESS OF BREATH: 0
EYE REDNESS: 0
WHEEZING: 0
SINUS PAIN: 0
NAUSEA: 0
ABDOMINAL PAIN: 0
VOMITING: 0
APNEA: 0

## 2023-05-15 NOTE — PROGRESS NOTES
treatment plan and result expectations have been discussed with the patient who expresses understanding and desires to proceed. Close follow up to evaluate treatment results and for coordination of care. I have reviewed the patient's medical history in detail and updated the computerized patient record.       SHREYA Murray - CNP

## 2023-05-19 LAB
BACTERIA UR CULT: ABNORMAL
BACTERIA UR CULT: ABNORMAL
ORGANISM: ABNORMAL

## 2023-05-20 ENCOUNTER — TELEPHONE (OUTPATIENT)
Dept: FAMILY MEDICINE CLINIC | Age: 63
End: 2023-05-20

## 2023-05-20 DIAGNOSIS — N30.01 ACUTE CYSTITIS WITH HEMATURIA: Primary | ICD-10-CM

## 2023-05-20 RX ORDER — NITROFURANTOIN 25; 75 MG/1; MG/1
100 CAPSULE ORAL 2 TIMES DAILY
Qty: 14 CAPSULE | Refills: 0 | Status: SHIPPED | OUTPATIENT
Start: 2023-05-20 | End: 2023-05-27

## 2023-05-20 NOTE — TELEPHONE ENCOUNTER
Called and advised pt to stop the oral Bactrim ATB and to start the new script of Macrobid as Bactrim is not the correct ATB. Pt aware to eat prior to taking med and to increase his fluids.  Pt aware of the [facility] today

## 2023-05-26 DIAGNOSIS — R35.0 URINARY FREQUENCY: ICD-10-CM

## 2023-05-26 RX ORDER — OXYBUTYNIN CHLORIDE 10 MG/1
10 TABLET, EXTENDED RELEASE ORAL EVERY EVENING
Qty: 90 TABLET | Refills: 3 | Status: SHIPPED | OUTPATIENT
Start: 2023-05-26

## 2023-05-26 NOTE — TELEPHONE ENCOUNTER
Comments:     Last Office Visit (last PCP visit):   11/15/2022    Next Visit Date:  Future Appointments   Date Time Provider Melody Romero   5/31/2023 12:15 PM Carry MD [facility] EMERGENCY [facility] AT [place]   10/30/2023  1:00 PM Chanell Mliler MD [facility] AT [place]   11/20/2023  3:00 PM Chanell Miller MD [facility] AT [place]       **If hasn't been seen in over a year OR hasn't followed up according to last diabetes/ADHD visit, make appointment for patient before sending refill to provider.     Rx requested:  Requested Prescriptions     Pending Prescriptions Disp Refills    oxybutynin (DITROPAN-XL) 10 MG extended release tablet [Pharmacy Med Name: OXYBUTYNIN CL ER 10 MG TABLET] 90 tablet 3     Sig: take 1 tablet by mouth every evening

## 2023-05-31 ENCOUNTER — OFFICE VISIT (OUTPATIENT)
Dept: FAMILY MEDICINE CLINIC | Age: 63
End: 2023-05-31
Payer: MEDICARE

## 2023-05-31 VITALS
HEIGHT: 71 IN | SYSTOLIC BLOOD PRESSURE: 136 MMHG | WEIGHT: 192 LBS | HEART RATE: 121 BPM | DIASTOLIC BLOOD PRESSURE: 80 MMHG | TEMPERATURE: 98.3 F | BODY MASS INDEX: 26.88 KG/M2 | OXYGEN SATURATION: 96 %

## 2023-05-31 DIAGNOSIS — N30.01 ACUTE CYSTITIS WITH HEMATURIA: Primary | ICD-10-CM

## 2023-05-31 DIAGNOSIS — N30.01 ACUTE CYSTITIS WITH HEMATURIA: ICD-10-CM

## 2023-05-31 DIAGNOSIS — N39.0 RECURRENT UTI: ICD-10-CM

## 2023-05-31 DIAGNOSIS — R35.0 BENIGN PROSTATIC HYPERPLASIA WITH URINARY FREQUENCY: ICD-10-CM

## 2023-05-31 DIAGNOSIS — N40.1 BENIGN PROSTATIC HYPERPLASIA WITH URINARY FREQUENCY: ICD-10-CM

## 2023-05-31 LAB
BILIRUBIN, POC: ABNORMAL
BLOOD URINE, POC: ABNORMAL
CLARITY, POC: ABNORMAL
COLOR, POC: YELLOW
GLUCOSE URINE, POC: ABNORMAL
KETONES, POC: ABNORMAL
LEUKOCYTE EST, POC: ABNORMAL
NITRITE, POC: POSITIVE
PH, POC: 6
PROTEIN, POC: 30
SPECIFIC GRAVITY, POC: 1.02
UROBILINOGEN, POC: 0.2

## 2023-05-31 PROCEDURE — 3075F SYST BP GE 130 - 139MM HG: CPT | Performed by: FAMILY MEDICINE

## 2023-05-31 PROCEDURE — 3079F DIAST BP 80-89 MM HG: CPT | Performed by: FAMILY MEDICINE

## 2023-05-31 PROCEDURE — 81002 URINALYSIS NONAUTO W/O SCOPE: CPT | Performed by: FAMILY MEDICINE

## 2023-05-31 PROCEDURE — 99213 OFFICE O/P EST LOW 20 MIN: CPT | Performed by: FAMILY MEDICINE

## 2023-05-31 RX ORDER — CEPHALEXIN 500 MG/1
500 CAPSULE ORAL 3 TIMES DAILY
Qty: 30 CAPSULE | Refills: 0 | Status: SHIPPED | OUTPATIENT
Start: 2023-05-31 | End: 2023-06-10

## 2023-05-31 NOTE — PROGRESS NOTES
Chief Complaint   Patient presents with    Urinary Tract Infection     Still has UTI        HPI:  Taye Fischer is a 66 y.o. male    Persistent UTI    Lopressor/norvasc for HTN    Known BPH history  flomax  Ditropan  Working well    Feeling a little foggy/fatigued    States he has had them before    Actually had workup for hematuria in past as well    History of \"nerve damage\"    Pain in lateral leg, sharp    Still smokes cigarettes and marijuana     Past Medical History:   Diagnosis Date    Abnormal finding on EKG 7/11/2017    Chronic back pain     Erectile dysfunction     HTN (hypertension) 1/13/2017    Sleep apnea     Substance abuse ([address]     Urinary incontinence      Past Surgical History:   Procedure Laterality Date    CYSTOSCOPY  06/07/2017    [facility] SKIM MD  BILATERAL RETROGRADE PYELOGRAMS BLADDER BX FULGURATION    FRACTURE SURGERY      LEG SURGERY Bilateral     screws and plates in both after broken      History reviewed. No pertinent family history. Social History     Socioeconomic History    Marital status: Single     Spouse name: None    Number of children: None    Years of education: None    Highest education level: None   Tobacco Use    Smoking status: Every Day     Packs/day: 1.00     Years: 20.00     Pack years: 20.00     Types: Cigarettes     Start date: 2/2/2022    Smokeless tobacco: Never    Tobacco comments:     Pt states he did quit 12/2021 but started back up in feb 2022   Substance and Sexual Activity    Alcohol use:  Yes     Alcohol/week: 5.0 standard drinks     Types: 5 Shots of liquor per week     Comment: socially    Drug use: Yes     Frequency: 1.0 times per week     Types: Marijuana Vijaya Blaire)     Comment: daily     Social Determinants of Health     Financial Resource Strain: Low Risk     Difficulty of Paying Living Expenses: Not hard at all   Food Insecurity: No Food Insecurity    Worried About Running Out of Food in the Last Year: Never true    Ching of Food in the Last Year: Never true

## 2023-06-03 LAB
BACTERIA UR CULT: ABNORMAL
BACTERIA UR CULT: ABNORMAL
ORGANISM: ABNORMAL

## 2023-06-13 DIAGNOSIS — R34 DECREASED URINE OUTPUT: ICD-10-CM

## 2023-06-15 LAB
BACTERIA UR CULT: ABNORMAL
BACTERIA UR CULT: ABNORMAL
ORGANISM: ABNORMAL

## 2023-06-22 ENCOUNTER — TELEPHONE (OUTPATIENT)
Dept: FAMILY MEDICINE CLINIC | Age: 63
End: 2023-06-22

## 2023-06-22 NOTE — TELEPHONE ENCOUNTER
----- Message from [place], [place] sent at 6/21/2023  2:43 PM EDT -----  Subject: Message to Provider    QUESTIONS  Information for Provider? Pt called and stated that he needs to schedule   an appointment for a bladder infection. Pt states that he has seen a   provider within the last 14 days for the bladder infection. Please call   the pt back to schedule.   ---------------------------------------------------------------------------  --------------  Josie [facility] EDILMA  [phone]; OK to leave message on voicemail  ---------------------------------------------------------------------------  --------------  SCRIPT ANSWERS  Relationship to Patient? Self  Have you recently (14 days) seen a provider for this problem?  Yes

## 2023-06-22 NOTE — TELEPHONE ENCOUNTER
Pt state just finished antibiotic and UTI is back, [facility] will be out till Tuesday, advised walk-in, states he will be in tomorrow

## 2023-06-23 ENCOUNTER — OFFICE VISIT (OUTPATIENT)
Dept: FAMILY MEDICINE CLINIC | Age: 63
End: 2023-06-23
Payer: MEDICARE

## 2023-06-23 VITALS
TEMPERATURE: 97.4 F | DIASTOLIC BLOOD PRESSURE: 74 MMHG | OXYGEN SATURATION: 98 % | SYSTOLIC BLOOD PRESSURE: 132 MMHG | WEIGHT: 193 LBS | BODY MASS INDEX: 27.02 KG/M2 | HEIGHT: 71 IN | HEART RATE: 82 BPM

## 2023-06-23 DIAGNOSIS — N39.0 RECURRENT URINARY TRACT INFECTION: ICD-10-CM

## 2023-06-23 DIAGNOSIS — N39.0 RECURRENT URINARY TRACT INFECTION: Primary | ICD-10-CM

## 2023-06-23 LAB
BILIRUBIN, POC: NORMAL
BLOOD URINE, POC: 25
CLARITY, POC: NORMAL
COLOR, POC: YELLOW
GLUCOSE URINE, POC: NORMAL
KETONES, POC: NORMAL
LEUKOCYTE EST, POC: 500
NITRITE, POC: NORMAL
PH, POC: 6
PROTEIN, POC: NORMAL
SPECIFIC GRAVITY, POC: 1
UROBILINOGEN, POC: 0.2

## 2023-06-23 PROCEDURE — 81003 URINALYSIS AUTO W/O SCOPE: CPT | Performed by: PHYSICIAN ASSISTANT

## 2023-06-23 PROCEDURE — 3075F SYST BP GE 130 - 139MM HG: CPT | Performed by: PHYSICIAN ASSISTANT

## 2023-06-23 PROCEDURE — 3078F DIAST BP <80 MM HG: CPT | Performed by: PHYSICIAN ASSISTANT

## 2023-06-23 PROCEDURE — 99213 OFFICE O/P EST LOW 20 MIN: CPT | Performed by: PHYSICIAN ASSISTANT

## 2023-06-23 RX ORDER — ZINC OXIDE 13 %
1 CREAM (GRAM) TOPICAL DAILY
Qty: 30 CAPSULE | Refills: 1 | Status: SHIPPED | OUTPATIENT
Start: 2023-06-23 | End: 2023-07-23

## 2023-06-23 RX ORDER — NITROFURANTOIN 25; 75 MG/1; MG/1
100 CAPSULE ORAL 2 TIMES DAILY
Qty: 20 CAPSULE | Refills: 0 | Status: SHIPPED | OUTPATIENT
Start: 2023-06-23 | End: 2023-06-23 | Stop reason: ALTCHOICE

## 2023-06-23 RX ORDER — SULFAMETHOXAZOLE AND TRIMETHOPRIM 800; 160 MG/1; MG/1
1 TABLET ORAL 2 TIMES DAILY
Qty: 20 TABLET | Refills: 0 | Status: SHIPPED | OUTPATIENT
Start: 2023-06-23 | End: 2023-07-03

## 2023-06-23 ASSESSMENT — ENCOUNTER SYMPTOMS
GASTROINTESTINAL NEGATIVE: 1
RESPIRATORY NEGATIVE: 1
EYES NEGATIVE: 1

## 2023-06-23 NOTE — PATIENT INSTRUCTIONS
Garlic capsules and from food nutrition   Urine and bladder support ([employer] strength is a good brand that is powered by cranberry)   Continue the increased fluids to help flush out the infection   Probiotic over the counter

## 2023-06-23 NOTE — PROGRESS NOTES
[address] [address] [place] , [facility]  Dept: [phone]  Loc: [phone]     Pt Name: Danny Fontanez  MRN: [id number]  [place] 1960      HISTORY OF PRESENT ILLNESS    Danny Fontanez is a 66 y.o. male who presents to the [facility] with chief complaint or urinary tract symptoms. He finished a course of antibiotics on Tuesday and developed symptoms shortly after. He just saw his urologist at [facility] on Wednesday. He is drinking a lot of water, but has issues with urinary retention. 10 years ago he had an infection on his spinal cord and the urine symptoms started after that. He has been dealing with this on and off for years. He has no other no concerning symptoms at this time. REVIEW OF SYSTEMS       Review of Systems   Constitutional: Negative. HENT: Negative. Eyes: Negative. Respiratory: Negative. Cardiovascular: Negative. Gastrointestinal: Negative. Endocrine: Negative. Genitourinary:  Positive for difficulty urinating and dysuria. Negative for urgency. Musculoskeletal: Negative. Skin: Negative. Neurological: Negative. Psychiatric/Behavioral: Negative.          PAST MEDICAL HISTORY     Past Medical History:   Diagnosis Date    Abnormal finding on EKG 7/11/2017    Chronic back pain     Erectile dysfunction     HTN (hypertension) 1/13/2017    Sleep apnea     Substance abuse ([facility] [address]     Urinary incontinence          SURGICAL HISTORY       Past Surgical History:   Procedure Laterality Date    CYSTOSCOPY  06/07/2017    [facility] SKIM MD  BILATERAL RETROGRADE PYELOGRAMS BLADDER BX FULGURATION    FRACTURE SURGERY      LEG SURGERY Bilateral     screws and plates in both after broken          CURRENT MEDICATIONS       Current Outpatient Medications   Medication Sig Dispense Refill    Probiotic Product (PROBIOTIC DAILY) CAPS Take 1 capsule by mouth daily 30 capsule 1

## 2023-06-27 LAB
BACTERIA UR CULT: ABNORMAL
BACTERIA UR CULT: ABNORMAL
ORGANISM: ABNORMAL

## 2023-06-28 ENCOUNTER — OFFICE VISIT (OUTPATIENT)
Dept: FAMILY MEDICINE CLINIC | Age: 63
End: 2023-06-28
Payer: MEDICARE

## 2023-06-28 VITALS
OXYGEN SATURATION: 97 % | WEIGHT: 137 LBS | SYSTOLIC BLOOD PRESSURE: 130 MMHG | HEART RATE: 101 BPM | DIASTOLIC BLOOD PRESSURE: 70 MMHG | HEIGHT: 71 IN | BODY MASS INDEX: 19.18 KG/M2 | TEMPERATURE: 98.4 F

## 2023-06-28 DIAGNOSIS — N39.0 RECURRENT URINARY TRACT INFECTION: Primary | ICD-10-CM

## 2023-06-28 PROCEDURE — 3078F DIAST BP <80 MM HG: CPT | Performed by: FAMILY MEDICINE

## 2023-06-28 PROCEDURE — 99213 OFFICE O/P EST LOW 20 MIN: CPT | Performed by: FAMILY MEDICINE

## 2023-06-28 PROCEDURE — 3075F SYST BP GE 130 - 139MM HG: CPT | Performed by: FAMILY MEDICINE

## 2023-06-28 RX ORDER — NITROFURANTOIN 25; 75 MG/1; MG/1
100 CAPSULE ORAL 2 TIMES DAILY
Qty: 20 CAPSULE | Refills: 0 | Status: SHIPPED | OUTPATIENT
Start: 2023-06-28 | End: 2023-07-08

## 2023-08-17 DIAGNOSIS — R12 HEART BURN: ICD-10-CM

## 2023-08-17 RX ORDER — LACTOBACILLUS ACIDOPHILUS 500MM CELL
CAPSULE ORAL
Qty: 30 CAPSULE | Refills: 5 | Status: SHIPPED | OUTPATIENT
Start: 2023-08-17

## 2023-08-17 RX ORDER — OMEPRAZOLE 40 MG/1
CAPSULE, DELAYED RELEASE ORAL
Qty: 90 CAPSULE | Refills: 1 | Status: SHIPPED | OUTPATIENT
Start: 2023-08-17

## 2023-10-30 ENCOUNTER — OFFICE VISIT (OUTPATIENT)
Dept: FAMILY MEDICINE CLINIC | Age: 63
End: 2023-10-30
Payer: MEDICARE

## 2023-10-30 VITALS
HEART RATE: 96 BPM | TEMPERATURE: 98 F | BODY MASS INDEX: 24.22 KG/M2 | OXYGEN SATURATION: 98 % | DIASTOLIC BLOOD PRESSURE: 76 MMHG | WEIGHT: 173 LBS | HEIGHT: 71 IN | SYSTOLIC BLOOD PRESSURE: 120 MMHG

## 2023-10-30 DIAGNOSIS — N39.0 RECURRENT URINARY TRACT INFECTION: ICD-10-CM

## 2023-10-30 DIAGNOSIS — I10 PRIMARY HYPERTENSION: Primary | ICD-10-CM

## 2023-10-30 DIAGNOSIS — N40.1 BENIGN PROSTATIC HYPERPLASIA WITH URINARY FREQUENCY: ICD-10-CM

## 2023-10-30 DIAGNOSIS — R35.0 URINARY FREQUENCY: ICD-10-CM

## 2023-10-30 DIAGNOSIS — R12 HEART BURN: ICD-10-CM

## 2023-10-30 DIAGNOSIS — Z23 NEEDS FLU SHOT: ICD-10-CM

## 2023-10-30 DIAGNOSIS — R35.0 BENIGN PROSTATIC HYPERPLASIA WITH URINARY FREQUENCY: ICD-10-CM

## 2023-10-30 DIAGNOSIS — N32.81 OAB (OVERACTIVE BLADDER): ICD-10-CM

## 2023-10-30 PROCEDURE — 3078F DIAST BP <80 MM HG: CPT | Performed by: FAMILY MEDICINE

## 2023-10-30 PROCEDURE — 90674 CCIIV4 VAC NO PRSV 0.5 ML IM: CPT | Performed by: FAMILY MEDICINE

## 2023-10-30 PROCEDURE — 3074F SYST BP LT 130 MM HG: CPT | Performed by: FAMILY MEDICINE

## 2023-10-30 PROCEDURE — 99214 OFFICE O/P EST MOD 30 MIN: CPT | Performed by: FAMILY MEDICINE

## 2023-10-30 PROCEDURE — G0008 ADMIN INFLUENZA VIRUS VAC: HCPCS | Performed by: FAMILY MEDICINE

## 2023-10-30 RX ORDER — LACTOBACILLUS ACIDOPHILUS 500MM CELL
1 CAPSULE ORAL DAILY
Qty: 30 CAPSULE | Refills: 5 | Status: SHIPPED | OUTPATIENT
Start: 2023-10-30

## 2023-10-30 RX ORDER — OXYBUTYNIN CHLORIDE 15 MG/1
15 TABLET, EXTENDED RELEASE ORAL EVERY EVENING
Qty: 90 TABLET | Refills: 2 | Status: SHIPPED | OUTPATIENT
Start: 2023-10-30

## 2023-10-30 RX ORDER — OMEPRAZOLE 40 MG/1
40 CAPSULE, DELAYED RELEASE ORAL
Qty: 90 CAPSULE | Refills: 1 | Status: SHIPPED | OUTPATIENT
Start: 2023-10-30

## 2023-10-30 RX ORDER — NITROFURANTOIN 25; 75 MG/1; MG/1
CAPSULE ORAL
COMMUNITY
Start: 2023-10-23 | End: 2023-10-30

## 2023-10-30 NOTE — PROGRESS NOTES
After obtaining consent, and per orders of Dr. Sukh Palencia, injection of flu given in Left deltoid by Tio Branham CMA (AAMA). Patient instructed to remain in clinic for 20 minutes afterwards, and to report any adverse reaction to me immediately. Vaccine Information Sheet, \"Influenza - Inactivated\"  given to Marisela Velasquez, or parent/legal guardian of  Marisela Velasquez and verbalized understanding. Patient responses:    Have you ever had a reaction to a flu vaccine? No  Are you able to eat eggs without adverse effects? Yes  Do you have any current illness? No  Have you ever had Guillian Van Voorhis Syndrome? No    Flu vaccine given per order. Please see immunization tab.

## 2023-10-30 NOTE — PROGRESS NOTES
Chief Complaint   Patient presents with    Frequent/Recurrent UTI     6 month     Discuss Medications     Oxybutynin only works for 4 hours and doesn't work for first hour    Health Maintenance     Declined colon        HPI:  Sheree Philippe is a 66 y.o. male    Checkup/follow up  Lopressor/norvasc for HTN    F/u recurrent UTI  Saw urology at [facility]    Known BPH history  flomax  Ditropan  Working well    Bp has been good       Actually had workup for hematuria in past as well    History of \"nerve damage\"    Pain in lateral leg, sharp    Still smokes cigarettes and marijuana     Past Medical History:   Diagnosis Date    Abnormal finding on EKG 7/11/2017    Chronic back pain     Erectile dysfunction     HTN (hypertension) 1/13/2017    Sleep apnea     Substance abuse ([address])     Urinary incontinence      Past Surgical History:   Procedure Laterality Date    CYSTOSCOPY  06/07/2017    [facility] SKIM MD  BILATERAL RETROGRADE PYELOGRAMS BLADDER BX FULGURATION    FRACTURE SURGERY      LEG SURGERY Bilateral     screws and plates in both after broken      History reviewed. No pertinent family history. Social History     Socioeconomic History    Marital status: Single     Spouse name: None    Number of children: None    Years of education: None    Highest education level: None   Tobacco Use    Smoking status: Every Day     Packs/day: 1.00     Years: 20.00     Additional pack years: 0.00     Total pack years: 20.00     Types: Cigarettes     Start date: 2/2/2022    Smokeless tobacco: Never    Tobacco comments:     Pt states he did quit 12/2021 but started back up in feb 2022   Substance and Sexual Activity    Alcohol use:  Yes     Alcohol/week: 5.0 standard drinks of alcohol     Types: 5 Shots of liquor per week     Comment: socially    Drug use: Yes     Frequency: 1.0 times per week     Types: Marijuana Fidel Harm)     Comment: daily     Social Determinants of Health     Financial Resource Strain: Low Risk  (3/3/2023)    Overall Financial

## 2023-12-06 ENCOUNTER — OFFICE VISIT (OUTPATIENT)
Dept: FAMILY MEDICINE CLINIC | Age: 63
End: 2023-12-06
Payer: MEDICARE

## 2023-12-06 VITALS
WEIGHT: 174 LBS | HEART RATE: 97 BPM | DIASTOLIC BLOOD PRESSURE: 78 MMHG | HEIGHT: 71 IN | SYSTOLIC BLOOD PRESSURE: 136 MMHG | OXYGEN SATURATION: 96 % | BODY MASS INDEX: 24.36 KG/M2

## 2023-12-06 DIAGNOSIS — J39.2 THROAT DRYNESS: Primary | ICD-10-CM

## 2023-12-06 PROCEDURE — 99213 OFFICE O/P EST LOW 20 MIN: CPT

## 2023-12-06 PROCEDURE — 3078F DIAST BP <80 MM HG: CPT

## 2023-12-06 PROCEDURE — 3075F SYST BP GE 130 - 139MM HG: CPT

## 2023-12-06 ASSESSMENT — ENCOUNTER SYMPTOMS
VOMITING: 0
COUGH: 0
DIARRHEA: 0
SORE THROAT: 1
NAUSEA: 0
TROUBLE SWALLOWING: 0
SHORTNESS OF BREATH: 0
WHEEZING: 0

## 2024-01-23 ENCOUNTER — OFFICE VISIT (OUTPATIENT)
Dept: FAMILY MEDICINE CLINIC | Age: 64
End: 2024-01-23
Payer: MEDICARE

## 2024-01-23 VITALS
HEART RATE: 83 BPM | DIASTOLIC BLOOD PRESSURE: 80 MMHG | SYSTOLIC BLOOD PRESSURE: 132 MMHG | WEIGHT: 174 LBS | OXYGEN SATURATION: 97 % | TEMPERATURE: 97.4 F | BODY MASS INDEX: 24.36 KG/M2 | HEIGHT: 71 IN

## 2024-01-23 DIAGNOSIS — I10 PRIMARY HYPERTENSION: ICD-10-CM

## 2024-01-23 DIAGNOSIS — R53.83 OTHER FATIGUE: ICD-10-CM

## 2024-01-23 DIAGNOSIS — N40.1 BENIGN PROSTATIC HYPERPLASIA WITH URINARY FREQUENCY: ICD-10-CM

## 2024-01-23 DIAGNOSIS — I10 ESSENTIAL HYPERTENSION: ICD-10-CM

## 2024-01-23 DIAGNOSIS — R35.0 BENIGN PROSTATIC HYPERPLASIA WITH URINARY FREQUENCY: ICD-10-CM

## 2024-01-23 DIAGNOSIS — N39.0 RECURRENT UTI: ICD-10-CM

## 2024-01-23 DIAGNOSIS — N32.81 OAB (OVERACTIVE BLADDER): ICD-10-CM

## 2024-01-23 DIAGNOSIS — J33.9 NASAL POLYPS: ICD-10-CM

## 2024-01-23 DIAGNOSIS — Z00.00 MEDICARE ANNUAL WELLNESS VISIT, SUBSEQUENT: Primary | ICD-10-CM

## 2024-01-23 DIAGNOSIS — Z12.5 SCREENING PSA (PROSTATE SPECIFIC ANTIGEN): ICD-10-CM

## 2024-01-23 PROCEDURE — 3079F DIAST BP 80-89 MM HG: CPT | Performed by: FAMILY MEDICINE

## 2024-01-23 PROCEDURE — 3075F SYST BP GE 130 - 139MM HG: CPT | Performed by: FAMILY MEDICINE

## 2024-01-23 PROCEDURE — G0439 PPPS, SUBSEQ VISIT: HCPCS | Performed by: FAMILY MEDICINE

## 2024-01-23 RX ORDER — METOPROLOL SUCCINATE 25 MG/1
25 TABLET, EXTENDED RELEASE ORAL DAILY
Qty: 90 TABLET | Refills: 3 | Status: SHIPPED | OUTPATIENT
Start: 2024-01-23

## 2024-01-23 RX ORDER — AMLODIPINE BESYLATE 10 MG/1
10 TABLET ORAL DAILY
Qty: 90 TABLET | Refills: 3 | Status: SHIPPED | OUTPATIENT
Start: 2024-01-23

## 2024-01-23 ASSESSMENT — PATIENT HEALTH QUESTIONNAIRE - PHQ9
SUM OF ALL RESPONSES TO PHQ9 QUESTIONS 1 & 2: 1
2. FEELING DOWN, DEPRESSED OR HOPELESS: 1
1. LITTLE INTEREST OR PLEASURE IN DOING THINGS: 0
SUM OF ALL RESPONSES TO PHQ QUESTIONS 1-9: 1

## 2024-01-23 ASSESSMENT — LIFESTYLE VARIABLES
HOW OFTEN DO YOU HAVE A DRINK CONTAINING ALCOHOL: NEVER
HOW MANY STANDARD DRINKS CONTAINING ALCOHOL DO YOU HAVE ON A TYPICAL DAY: PATIENT DOES NOT DRINK

## 2024-01-23 NOTE — PROGRESS NOTES
Medicare Annual Wellness Visit    Chay Falk is here for Medicare AWV (Feels depression is from BP, constipation, diarrhea, nausea at nigh,itch )    Assessment & Plan   Medicare annual wellness visit, subsequent  Benign prostatic hyperplasia with urinary frequency  OAB (overactive bladder)  Primary hypertension  -     metoprolol succinate (TOPROL XL) 25 MG extended release tablet; Take 1 tablet by mouth daily, Disp-90 tablet, R-3Normal  -     Comprehensive Metabolic Panel; Future  -     CBC; Future  -     Lipid Panel; Future  Recurrent UTI  Screening PSA (prostate specific antigen)  -     PSA Screening; Future  Essential hypertension  -     amLODIPine (NORVASC) 10 MG tablet; Take 1 tablet by mouth daily, Disp-90 tablet, R-3Normal  Other fatigue  -     TSH; Future  Nasal polyps  -     Josephine Flores MD , Otolaryngology/ENT - [place]  Recommendations for Preventive Services Due: see orders and patient instructions/AVS.  Recommended screening schedule for the next 5-10 years is provided to the patient in written form: see Patient Instructions/AVS.     No follow-ups on file.     Subjective   Chief Complaint   Patient presents with    Medicare AWV     Feels depression is from BP, constipation, diarrhea, nausea at nigh,itch        Self catheterizing intermittently     Has stopped the ditropan and flomax     Was getting severe dry mouth with ditropan    Continues to use marijuana    Patient's complete Health Risk Assessment and screening values have been reviewed and are found in Flowsheets. The following problems were reviewed today and where indicated follow up appointments were made and/or referrals ordered.    Positive Risk Factor Screenings with Interventions:    Fall Risk:  Do you feel unsteady or are you worried about falling? : (!) yes  2 or more falls in past year?: no  Fall with injury in past year?: no     Interventions:    Reviewed medications, home hazards, visual acuity, and co-morbidities that can

## 2024-01-24 DIAGNOSIS — R53.83 OTHER FATIGUE: ICD-10-CM

## 2024-01-24 DIAGNOSIS — I10 PRIMARY HYPERTENSION: ICD-10-CM

## 2024-01-24 DIAGNOSIS — Z12.5 SCREENING PSA (PROSTATE SPECIFIC ANTIGEN): ICD-10-CM

## 2024-01-24 LAB
ALP SERPL-CCNC: 74 U/L (ref 35–104)
ALT SERPL-CCNC: 9 U/L (ref 0–41)
ANION GAP SERPL CALCULATED.3IONS-SCNC: 10 MEQ/L (ref 9–15)
AST SERPL-CCNC: 14 U/L (ref 0–40)
BILIRUB SERPL-MCNC: 0.4 MG/DL (ref 0.2–0.7)
BUN SERPL-MCNC: 16 MG/DL (ref 8–23)
CALCIUM SERPL-MCNC: 9.3 MG/DL (ref 8.5–9.9)
CHLORIDE SERPL-SCNC: 104 MEQ/L (ref 95–107)
CHOLEST SERPL-MCNC: 236 MG/DL (ref 0–199)
CO2 SERPL-SCNC: 24 MEQ/L (ref 20–31)
CREAT SERPL-MCNC: 0.84 MG/DL (ref 0.7–1.2)
ERYTHROCYTE [DISTWIDTH] IN BLOOD BY AUTOMATED COUNT: 15.8 % (ref 11.5–14.5)
GLOBULIN SER CALC-MCNC: 2.9 G/DL (ref 2.3–3.5)
GLUCOSE SERPL-MCNC: 100 MG/DL (ref 70–99)
HCT VFR BLD AUTO: 50.8 % (ref 42–52)
HDLC SERPL-MCNC: 28 MG/DL (ref 40–59)
HGB BLD-MCNC: 16.7 G/DL (ref 14–18)
LDLC SERPL CALC-MCNC: 188 MG/DL (ref 0–129)
MCH RBC QN AUTO: 30 PG (ref 27–31.3)
MCHC RBC AUTO-ENTMCNC: 32.9 % (ref 33–37)
MCV RBC AUTO: 91.2 FL (ref 79–92.2)
PLATELET # BLD AUTO: 300 K/UL (ref 130–400)
POTASSIUM SERPL-SCNC: 4.5 MEQ/L (ref 3.4–4.9)
PROT SERPL-MCNC: 7 G/DL (ref 6.3–8)
PSA SERPL-MCNC: 0.79 NG/ML (ref 0–4)
RBC # BLD AUTO: 5.57 M/UL (ref 4.7–6.1)
SODIUM SERPL-SCNC: 138 MEQ/L (ref 135–144)
TRIGL SERPL-MCNC: 100 MG/DL (ref 0–150)
TSH SERPL-MCNC: 3.84 UIU/ML (ref 0.44–3.86)
WBC # BLD AUTO: 9.7 K/UL (ref 4.8–10.8)

## 2024-02-01 DIAGNOSIS — I10 ESSENTIAL HYPERTENSION: ICD-10-CM

## 2024-02-06 DIAGNOSIS — I10 PRIMARY HYPERTENSION: ICD-10-CM

## 2024-02-06 RX ORDER — METOPROLOL SUCCINATE 25 MG/1
25 TABLET, EXTENDED RELEASE ORAL DAILY
Qty: 90 TABLET | Refills: 3 | Status: SHIPPED | OUTPATIENT
Start: 2024-02-06

## 2024-02-08 ENCOUNTER — TELEPHONE (OUTPATIENT)
Dept: FAMILY MEDICINE CLINIC | Age: 64
End: 2024-02-08

## 2024-02-08 NOTE — TELEPHONE ENCOUNTER
Pt came in and went to the  [place] side check in, wouldn't give his date of birth, said she should know that ,   he went and sat down in the lobby. I told him that we needed his name and date of birth to get him on the schedule from the lobby he yelled his birth date. He then got up and came  to my window and was saying that I was trying to over dose him. He then took a pill bottle said the name of it and slammed it on the counter.  He was yelling at me saying I was double dosing him and trying to over dose him. He then picked up the pill bottles and threw them at me. While walking out of the office he told me to take them said  that he was done he then left the building

## 2024-07-12 ENCOUNTER — TELEPHONE (OUTPATIENT)
Dept: FAMILY MEDICINE CLINIC | Age: 64
End: 2024-07-12

## 2024-08-11 DIAGNOSIS — R12 HEART BURN: ICD-10-CM

## 2024-08-11 DIAGNOSIS — L02.91 ABSCESS: ICD-10-CM

## 2024-08-11 RX ORDER — CLINDAMYCIN HYDROCHLORIDE 300 MG/1
CAPSULE ORAL
Qty: 30 CAPSULE | Refills: 0 | OUTPATIENT
Start: 2024-08-11

## 2024-08-12 RX ORDER — OMEPRAZOLE 40 MG/1
40 CAPSULE, DELAYED RELEASE ORAL
Qty: 90 CAPSULE | Refills: 1 | Status: SHIPPED | OUTPATIENT
Start: 2024-08-12

## 2024-08-22 ENCOUNTER — OFFICE VISIT (OUTPATIENT)
Dept: FAMILY MEDICINE CLINIC | Age: 64
End: 2024-08-22
Payer: MEDICARE

## 2024-08-22 VITALS
OXYGEN SATURATION: 97 % | DIASTOLIC BLOOD PRESSURE: 88 MMHG | SYSTOLIC BLOOD PRESSURE: 142 MMHG | WEIGHT: 171 LBS | HEART RATE: 72 BPM | HEIGHT: 71 IN | BODY MASS INDEX: 23.94 KG/M2 | TEMPERATURE: 97.9 F

## 2024-08-22 DIAGNOSIS — R35.0 URINE FREQUENCY: ICD-10-CM

## 2024-08-22 DIAGNOSIS — N30.00 ACUTE CYSTITIS WITHOUT HEMATURIA: Primary | ICD-10-CM

## 2024-08-22 LAB
BILIRUBIN, POC: NORMAL
BLOOD URINE, POC: NORMAL
CLARITY, POC: CLEAR
COLOR, POC: NORMAL
GLUCOSE URINE, POC: NORMAL
KETONES, POC: NORMAL
LEUKOCYTE EST, POC: NORMAL
NITRITE, POC: NORMAL
PH, POC: 6
PROTEIN, POC: NORMAL
SPECIFIC GRAVITY, POC: 1.01
UROBILINOGEN, POC: NORMAL

## 2024-08-22 PROCEDURE — 81003 URINALYSIS AUTO W/O SCOPE: CPT | Performed by: FAMILY MEDICINE

## 2024-08-22 PROCEDURE — 3079F DIAST BP 80-89 MM HG: CPT | Performed by: FAMILY MEDICINE

## 2024-08-22 PROCEDURE — 99214 OFFICE O/P EST MOD 30 MIN: CPT | Performed by: FAMILY MEDICINE

## 2024-08-22 PROCEDURE — 3077F SYST BP >= 140 MM HG: CPT | Performed by: FAMILY MEDICINE

## 2024-08-22 RX ORDER — SULFAMETHOXAZOLE AND TRIMETHOPRIM 800; 160 MG/1; MG/1
1 TABLET ORAL 2 TIMES DAILY
Qty: 20 TABLET | Refills: 0 | Status: SHIPPED | OUTPATIENT
Start: 2024-08-22 | End: 2024-09-01

## 2024-08-22 SDOH — ECONOMIC STABILITY: INCOME INSECURITY: HOW HARD IS IT FOR YOU TO PAY FOR THE VERY BASICS LIKE FOOD, HOUSING, MEDICAL CARE, AND HEATING?: NOT HARD AT ALL

## 2024-08-22 SDOH — ECONOMIC STABILITY: FOOD INSECURITY: WITHIN THE PAST 12 MONTHS, YOU WORRIED THAT YOUR FOOD WOULD RUN OUT BEFORE YOU GOT MONEY TO BUY MORE.: NEVER TRUE

## 2024-08-22 SDOH — ECONOMIC STABILITY: FOOD INSECURITY: WITHIN THE PAST 12 MONTHS, THE FOOD YOU BOUGHT JUST DIDN'T LAST AND YOU DIDN'T HAVE MONEY TO GET MORE.: NEVER TRUE

## 2024-08-22 ASSESSMENT — ENCOUNTER SYMPTOMS
VOMITING: 0
ABDOMINAL PAIN: 0
NAUSEA: 0
DIARRHEA: 0

## 2024-08-22 NOTE — PROGRESS NOTES
[facility] SPECIALTY/PRIMARY CARE  [address], [address]  [facility] [address]  Dept: [phone]  Dept Fax: [phone]  Loc: [phone]     8/22/2024    Visit type: Follow up    Reason for Visit: Urinary Tract Infection (Pt c/o foul smell in urine, sudden urge and frequency of urination. Denies lower abdominal pain, burning sensation. Sx x1 mos. )         Patient: Chay Falk is a 64 y.o. male   HPI: 64-year-old male presents with possible urine, urgency and frequency of urination.  Patient states this has been ongoing for 1 month.  Patient is concerned that he has an infection.    ASSESSMENT/PLAN   Acute cystitis without hematuria  -     sulfamethoxazole-trimethoprim (BACTRIM DS;SEPTRA DS) 800-160 MG per tablet; Take 1 tablet by mouth 2 times daily for 10 days, Disp-20 tablet, R-0Normal  Urine frequency  -     POCT Urinalysis No Micro (Auto)  -     sulfamethoxazole-trimethoprim (BACTRIM DS;SEPTRA DS) 800-160 MG per tablet; Take 1 tablet by mouth 2 times daily for 10 days, Disp-20 tablet, R-0Normal     -reports fever and chills, reports fatigue and activity modification consistent with systemic symptoms  -Adverse effects of medications prescribed were discussed, patient acknowledged understanding.  -Patient was advised to let me know if there is no improvement in the next 7 to 10 days. Discussed red flags warranting decision to go to the emergency department and patient understood.       Orders Placed This Encounter   Medications    sulfamethoxazole-trimethoprim (BACTRIM DS;SEPTRA DS) 800-160 MG per tablet     Sig: Take 1 tablet by mouth 2 times daily for 10 days     Dispense:  20 tablet     Refill:  0        Treatment plan/ rationale and result expectations have been discussed with the patient who expresses understanding and desires to proceed.  Medication adverse effects, labs, images have been reviewed with the patient.    Subjective      Review of Systems